# Patient Record
Sex: MALE | Race: WHITE | Employment: OTHER | URBAN - METROPOLITAN AREA
[De-identification: names, ages, dates, MRNs, and addresses within clinical notes are randomized per-mention and may not be internally consistent; named-entity substitution may affect disease eponyms.]

---

## 2017-01-09 ENCOUNTER — TELEPHONE (OUTPATIENT)
Dept: INTERNAL MEDICINE CLINIC | Age: 82
End: 2017-01-09

## 2017-01-16 ENCOUNTER — OFFICE VISIT (OUTPATIENT)
Dept: INTERNAL MEDICINE CLINIC | Age: 82
End: 2017-01-16

## 2017-01-16 VITALS
HEART RATE: 62 BPM | SYSTOLIC BLOOD PRESSURE: 118 MMHG | HEIGHT: 72 IN | OXYGEN SATURATION: 96 % | DIASTOLIC BLOOD PRESSURE: 68 MMHG | BODY MASS INDEX: 26.14 KG/M2 | TEMPERATURE: 98 F | WEIGHT: 193 LBS | RESPIRATION RATE: 12 BRPM

## 2017-01-16 DIAGNOSIS — J06.9 UPPER RESPIRATORY TRACT INFECTION, UNSPECIFIED TYPE: ICD-10-CM

## 2017-01-16 DIAGNOSIS — F32.A DEPRESSION, UNSPECIFIED DEPRESSION TYPE: Primary | ICD-10-CM

## 2017-01-16 PROBLEM — I50.22 CHRONIC SYSTOLIC HEART FAILURE (HCC): Status: ACTIVE | Noted: 2017-01-16

## 2017-01-16 PROCEDURE — 99213 OFFICE O/P EST LOW 20 MIN: CPT | Performed by: INTERNAL MEDICINE

## 2017-01-16 RX ORDER — AMOXICILLIN 875 MG/1
875 TABLET, COATED ORAL 2 TIMES DAILY
Qty: 20 TABLET | Refills: 0 | Status: SHIPPED | OUTPATIENT
Start: 2017-01-16 | End: 2017-01-26

## 2017-01-16 ASSESSMENT — ENCOUNTER SYMPTOMS
WHEEZING: 0
ABDOMINAL PAIN: 0
COUGH: 1
SORE THROAT: 1
NAUSEA: 0
CHEST TIGHTNESS: 0
SHORTNESS OF BREATH: 0
VOMITING: 0
RHINORRHEA: 1

## 2017-02-21 DIAGNOSIS — F32.A DEPRESSION, UNSPECIFIED DEPRESSION TYPE: ICD-10-CM

## 2017-02-21 RX ORDER — SERTRALINE HYDROCHLORIDE 25 MG/1
TABLET, FILM COATED ORAL
Qty: 30 TABLET | Refills: 3 | Status: SHIPPED | OUTPATIENT
Start: 2017-02-21 | End: 2017-06-23 | Stop reason: SDUPTHER

## 2017-04-10 ENCOUNTER — OFFICE VISIT (OUTPATIENT)
Dept: INTERNAL MEDICINE CLINIC | Age: 82
End: 2017-04-10

## 2017-04-10 VITALS
HEIGHT: 72 IN | BODY MASS INDEX: 26.71 KG/M2 | WEIGHT: 197.2 LBS | DIASTOLIC BLOOD PRESSURE: 70 MMHG | OXYGEN SATURATION: 95 % | RESPIRATION RATE: 12 BRPM | TEMPERATURE: 97.9 F | HEART RATE: 67 BPM | SYSTOLIC BLOOD PRESSURE: 110 MMHG

## 2017-04-10 DIAGNOSIS — I20.1 PRINZMETAL ANGINA (HCC): ICD-10-CM

## 2017-04-10 DIAGNOSIS — M79.642 BILATERAL HAND PAIN: ICD-10-CM

## 2017-04-10 DIAGNOSIS — M79.641 BILATERAL HAND PAIN: ICD-10-CM

## 2017-04-10 DIAGNOSIS — I50.42 CHRONIC COMBINED SYSTOLIC AND DIASTOLIC HEART FAILURE (HCC): ICD-10-CM

## 2017-04-10 DIAGNOSIS — H91.90 HEARING LOSS, UNSPECIFIED LATERALITY: ICD-10-CM

## 2017-04-10 DIAGNOSIS — F32.A DEPRESSION, UNSPECIFIED DEPRESSION TYPE: Primary | ICD-10-CM

## 2017-04-10 PROCEDURE — 99214 OFFICE O/P EST MOD 30 MIN: CPT | Performed by: INTERNAL MEDICINE

## 2017-04-13 ASSESSMENT — ENCOUNTER SYMPTOMS
DIARRHEA: 0
ABDOMINAL PAIN: 0
SORE THROAT: 0
RHINORRHEA: 0
SHORTNESS OF BREATH: 0
VOMITING: 0
CHEST TIGHTNESS: 0
CONSTIPATION: 0
NAUSEA: 0
COUGH: 0

## 2017-04-24 ENCOUNTER — OFFICE VISIT (OUTPATIENT)
Dept: ORTHOPEDIC SURGERY | Age: 82
End: 2017-04-24

## 2017-04-24 VITALS — HEIGHT: 72 IN | WEIGHT: 197.09 LBS | BODY MASS INDEX: 26.7 KG/M2

## 2017-04-24 DIAGNOSIS — M25.531 BILATERAL WRIST PAIN: Primary | ICD-10-CM

## 2017-04-24 DIAGNOSIS — M65.30 TRIGGER FINGER, UNSPECIFIED FINGER, UNSPECIFIED LATERALITY: ICD-10-CM

## 2017-04-24 DIAGNOSIS — G56.03 BILATERAL CARPAL TUNNEL SYNDROME: ICD-10-CM

## 2017-04-24 DIAGNOSIS — M25.532 BILATERAL WRIST PAIN: Primary | ICD-10-CM

## 2017-04-24 PROCEDURE — 73110 X-RAY EXAM OF WRIST: CPT | Performed by: ORTHOPAEDIC SURGERY

## 2017-04-24 PROCEDURE — 99203 OFFICE O/P NEW LOW 30 MIN: CPT | Performed by: ORTHOPAEDIC SURGERY

## 2017-04-26 ENCOUNTER — OFFICE VISIT (OUTPATIENT)
Dept: ORTHOPEDIC SURGERY | Age: 82
End: 2017-04-26

## 2017-04-26 DIAGNOSIS — M79.602 PAIN IN BOTH UPPER EXTREMITIES: Primary | ICD-10-CM

## 2017-04-26 DIAGNOSIS — M79.601 PAIN IN BOTH UPPER EXTREMITIES: Primary | ICD-10-CM

## 2017-04-26 PROCEDURE — 95886 MUSC TEST DONE W/N TEST COMP: CPT | Performed by: PHYSICAL MEDICINE & REHABILITATION

## 2017-04-26 PROCEDURE — 95911 NRV CNDJ TEST 9-10 STUDIES: CPT | Performed by: PHYSICAL MEDICINE & REHABILITATION

## 2017-05-01 ENCOUNTER — OFFICE VISIT (OUTPATIENT)
Dept: ORTHOPEDIC SURGERY | Age: 82
End: 2017-05-01

## 2017-05-01 VITALS — HEIGHT: 72 IN | BODY MASS INDEX: 26.7 KG/M2 | WEIGHT: 197.09 LBS

## 2017-05-01 DIAGNOSIS — M65.30 TRIGGER FINGER (ACQUIRED): ICD-10-CM

## 2017-05-01 DIAGNOSIS — G56.02 CARPAL TUNNEL SYNDROME ON LEFT: ICD-10-CM

## 2017-05-01 DIAGNOSIS — G56.01 CARPAL TUNNEL SYNDROME ON RIGHT: Primary | ICD-10-CM

## 2017-05-01 PROCEDURE — 99213 OFFICE O/P EST LOW 20 MIN: CPT | Performed by: ORTHOPAEDIC SURGERY

## 2017-06-23 DIAGNOSIS — F32.A DEPRESSION, UNSPECIFIED DEPRESSION TYPE: ICD-10-CM

## 2017-06-23 RX ORDER — SERTRALINE HYDROCHLORIDE 25 MG/1
TABLET, FILM COATED ORAL
Qty: 30 TABLET | Refills: 2 | OUTPATIENT
Start: 2017-06-23

## 2017-06-23 RX ORDER — SERTRALINE HYDROCHLORIDE 25 MG/1
TABLET, FILM COATED ORAL
Qty: 30 TABLET | Refills: 2 | Status: SHIPPED | OUTPATIENT
Start: 2017-06-23 | End: 2017-09-27 | Stop reason: SDUPTHER

## 2017-07-10 ENCOUNTER — OFFICE VISIT (OUTPATIENT)
Dept: INTERNAL MEDICINE CLINIC | Age: 82
End: 2017-07-10

## 2017-07-10 VITALS
WEIGHT: 202.8 LBS | HEIGHT: 72 IN | OXYGEN SATURATION: 95 % | DIASTOLIC BLOOD PRESSURE: 60 MMHG | SYSTOLIC BLOOD PRESSURE: 100 MMHG | RESPIRATION RATE: 14 BRPM | HEART RATE: 65 BPM | TEMPERATURE: 98 F | BODY MASS INDEX: 27.47 KG/M2

## 2017-07-10 DIAGNOSIS — R05.9 COUGH: ICD-10-CM

## 2017-07-10 DIAGNOSIS — R73.03 PREDIABETES: ICD-10-CM

## 2017-07-10 DIAGNOSIS — R31.9 HEMATURIA: ICD-10-CM

## 2017-07-10 DIAGNOSIS — F32.A DEPRESSION, UNSPECIFIED DEPRESSION TYPE: Primary | ICD-10-CM

## 2017-07-10 DIAGNOSIS — D64.9 ANEMIA, UNSPECIFIED TYPE: ICD-10-CM

## 2017-07-10 DIAGNOSIS — E78.5 HYPERLIPIDEMIA, UNSPECIFIED HYPERLIPIDEMIA TYPE: ICD-10-CM

## 2017-07-10 DIAGNOSIS — R82.998 LEUKOCYTES IN URINE: ICD-10-CM

## 2017-07-10 DIAGNOSIS — I10 ESSENTIAL HYPERTENSION: ICD-10-CM

## 2017-07-10 DIAGNOSIS — J30.9 ALLERGIC RHINITIS, UNSPECIFIED ALLERGIC RHINITIS TRIGGER, UNSPECIFIED RHINITIS SEASONALITY: ICD-10-CM

## 2017-07-10 LAB
APPEARANCE FLUID: ABNORMAL
BILIRUBIN, POC: ABNORMAL
BLOOD URINE, POC: ABNORMAL
CLARITY, POC: ABNORMAL
COLOR, POC: ABNORMAL
GLUCOSE URINE, POC: ABNORMAL
KETONES, POC: ABNORMAL
LEUKOCYTE EST, POC: ABNORMAL
NITRITE, POC: ABNORMAL
PH, POC: 6
PROTEIN, POC: ABNORMAL
SPECIFIC GRAVITY, POC: 1.02
UROBILINOGEN, POC: 0.2

## 2017-07-10 PROCEDURE — 99214 OFFICE O/P EST MOD 30 MIN: CPT | Performed by: INTERNAL MEDICINE

## 2017-07-10 PROCEDURE — 81002 URINALYSIS NONAUTO W/O SCOPE: CPT | Performed by: INTERNAL MEDICINE

## 2017-07-10 RX ORDER — BENZONATATE 100 MG/1
100 CAPSULE ORAL 3 TIMES DAILY PRN
Qty: 30 CAPSULE | Refills: 0 | Status: SHIPPED | OUTPATIENT
Start: 2017-07-10 | End: 2017-07-17

## 2017-07-10 RX ORDER — LORATADINE 10 MG/1
10 TABLET ORAL DAILY
Qty: 30 TABLET | Refills: 1 | Status: SHIPPED | OUTPATIENT
Start: 2017-07-10 | End: 2018-01-08 | Stop reason: SDUPTHER

## 2017-07-10 RX ORDER — FLUTICASONE PROPIONATE 50 MCG
2 SPRAY, SUSPENSION (ML) NASAL DAILY
Qty: 1 BOTTLE | Refills: 0 | Status: SHIPPED | OUTPATIENT
Start: 2017-07-10 | End: 2017-07-26 | Stop reason: SDUPTHER

## 2017-07-10 ASSESSMENT — PATIENT HEALTH QUESTIONNAIRE - PHQ9
1. LITTLE INTEREST OR PLEASURE IN DOING THINGS: 1
SUM OF ALL RESPONSES TO PHQ QUESTIONS 1-9: 2
SUM OF ALL RESPONSES TO PHQ9 QUESTIONS 1 & 2: 2
2. FEELING DOWN, DEPRESSED OR HOPELESS: 1

## 2017-07-12 DIAGNOSIS — N39.0 URINARY TRACT INFECTION WITH HEMATURIA, SITE UNSPECIFIED: Primary | ICD-10-CM

## 2017-07-12 DIAGNOSIS — R31.9 URINARY TRACT INFECTION WITH HEMATURIA, SITE UNSPECIFIED: Primary | ICD-10-CM

## 2017-07-12 LAB
ORGANISM: ABNORMAL
URINE CULTURE, ROUTINE: ABNORMAL

## 2017-07-12 RX ORDER — CIPROFLOXACIN 500 MG/1
500 TABLET, FILM COATED ORAL 2 TIMES DAILY
Qty: 14 TABLET | Refills: 0 | Status: SHIPPED | OUTPATIENT
Start: 2017-07-12 | End: 2017-07-19

## 2017-07-13 ASSESSMENT — ENCOUNTER SYMPTOMS
ABDOMINAL PAIN: 0
WHEEZING: 0
SORE THROAT: 0
DIARRHEA: 0
NAUSEA: 0
RHINORRHEA: 0
VOMITING: 0
CHEST TIGHTNESS: 0
CONSTIPATION: 0
SHORTNESS OF BREATH: 0
COUGH: 0

## 2017-07-15 LAB
ALBUMIN SERPL-MCNC: 4 G/DL (ref 3.5–5)
ALP BLD-CCNC: 58 IU/L (ref 35–135)
ALT SERPL-CCNC: 16 IU/L (ref 10–60)
AST SERPL-CCNC: 21 IU/L (ref 10–40)
AVERAGE GLUCOSE: NORMAL
BASOPHILS ABSOLUTE: 0.1 THOU/MCL (ref 0.01–0.07)
BASOPHILS ABSOLUTE: 1 %
BILIRUB SERPL-MCNC: 0.5 MG/DL (ref 0–1.2)
BUN BLDV-MCNC: 20 MG/DL (ref 8–26)
CALCIUM SERPL-MCNC: 9.1 MG/DL (ref 8.5–10.5)
CHLORIDE BLD-SCNC: 105 MEQ/L (ref 101–111)
CHOLESTEROL, TOTAL: 139 MG/DL
CHOLESTEROL/HDL RATIO: 3.9 (ref 1.9–4.2)
CO2: 26 MEQ/L (ref 24–36)
CREAT SERPL-MCNC: 1.3 MG/DL (ref 0.64–1.27)
EOSINOPHILS ABSOLUTE: 0.94 THOU/MCL (ref 0.03–0.45)
EOSINOPHILS RELATIVE PERCENT: 10 %
FERRITIN: 128.3 NG/ML (ref 23.9–336.2)
GFR AFRICAN AMERICAN: >60 ML/MIN/1.73 SQ METER
GFR NON-AFRICAN AMERICAN: 53 ML/MIN/1.73 SQ METER
GLUCOSE BLD-MCNC: 125 MG/DL (ref 70–99)
HBA1C MFR BLD: 5.7 %
HCT VFR BLD CALC: 44 % (ref 40–50)
HDLC SERPL-MCNC: 36 MG/DL
HEMOGLOBIN: 14.7 G/DL (ref 13.5–16.5)
IRON SATURATION: 24 % (ref 20–55)
IRON: 79 MCG/DL (ref 50–160)
LDL CHOLESTEROL CALCULATED: 83 MG/DL
LDL/HDL RATIO: 2.3 (ref 1–4)
LYMPHOCYTES ABSOLUTE: 2.76 THOU/MCL (ref 1–4)
LYMPHOCYTES RELATIVE PERCENT: 30 %
MCH RBC QN AUTO: 34 PG (ref 27–33)
MCHC RBC AUTO-ENTMCNC: 33 G/DL (ref 32–36)
MCV RBC AUTO: 102 FL (ref 82–97)
MONOCYTES # BLD: 12 %
MONOCYTES ABSOLUTE: 1.11 THOU/MCL (ref 0.2–0.9)
NEUTROPHILS ABSOLUTE: 4.24 THOU/MCL (ref 1.8–7.7)
PDW BLD-RTO: 14.2 %
PLATELET # BLD: 357 THOU/MCL (ref 140–375)
POTASSIUM SERPL-SCNC: 4.3 MEQ/L (ref 3.6–5.1)
RBC # BLD: 4.32 MIL/MCL (ref 4.4–5.8)
SEG NEUTROPHILS: 47 %
SODIUM BLD-SCNC: 139 MEQ/L (ref 135–145)
TOTAL IRON BINDING CAPACITY: 333 MCG/DL (ref 250–400)
TOTAL PROTEIN: 7 G/DL (ref 6–8)
TRIGL SERPL-MCNC: 101 MG/DL
TSH ULTRASENSITIVE: 1.88 MIU/L (ref 0.4–4.2)
WBC # BLD: 9.1 THOU/MCL (ref 3.6–10.5)

## 2017-07-16 LAB
ESTIMATED AVERAGE GLUCOSE: 146 MG/DL
HBA1C MFR BLD: 6.7 % (ref 4–5.6)

## 2017-07-17 DIAGNOSIS — R05.9 COUGH: ICD-10-CM

## 2017-07-19 ENCOUNTER — TELEPHONE (OUTPATIENT)
Dept: INTERNAL MEDICINE CLINIC | Age: 82
End: 2017-07-19

## 2017-07-26 ENCOUNTER — OFFICE VISIT (OUTPATIENT)
Dept: INTERNAL MEDICINE CLINIC | Age: 82
End: 2017-07-26

## 2017-07-26 VITALS
BODY MASS INDEX: 27.77 KG/M2 | OXYGEN SATURATION: 96 % | TEMPERATURE: 97.9 F | SYSTOLIC BLOOD PRESSURE: 114 MMHG | HEART RATE: 74 BPM | DIASTOLIC BLOOD PRESSURE: 70 MMHG | WEIGHT: 205 LBS | HEIGHT: 72 IN

## 2017-07-26 DIAGNOSIS — I10 ESSENTIAL HYPERTENSION: ICD-10-CM

## 2017-07-26 DIAGNOSIS — I50.42 CHRONIC COMBINED SYSTOLIC AND DIASTOLIC HEART FAILURE (HCC): ICD-10-CM

## 2017-07-26 DIAGNOSIS — N40.1 BENIGN PROSTATIC HYPERPLASIA WITH URINARY OBSTRUCTION: ICD-10-CM

## 2017-07-26 DIAGNOSIS — Z01.818 PRE-OP EVALUATION: Primary | ICD-10-CM

## 2017-07-26 DIAGNOSIS — Z95.810 CARDIAC DEFIBRILLATOR IN PLACE: ICD-10-CM

## 2017-07-26 DIAGNOSIS — N13.8 BENIGN PROSTATIC HYPERPLASIA WITH URINARY OBSTRUCTION: ICD-10-CM

## 2017-07-26 DIAGNOSIS — R31.9 HEMATURIA: Chronic | ICD-10-CM

## 2017-07-26 DIAGNOSIS — E78.2 MIXED HYPERLIPIDEMIA: Chronic | ICD-10-CM

## 2017-07-26 LAB
ANION GAP SERPL CALCULATED.3IONS-SCNC: 16 MMOL/L (ref 3–16)
BUN BLDV-MCNC: 15 MG/DL (ref 7–20)
CALCIUM SERPL-MCNC: 9.2 MG/DL (ref 8.3–10.6)
CHLORIDE BLD-SCNC: 103 MMOL/L (ref 99–110)
CO2: 23 MMOL/L (ref 21–32)
CREAT SERPL-MCNC: 1.2 MG/DL (ref 0.8–1.3)
GFR AFRICAN AMERICAN: >60
GFR NON-AFRICAN AMERICAN: 58
GLUCOSE BLD-MCNC: 101 MG/DL (ref 70–99)
POTASSIUM SERPL-SCNC: 4.4 MMOL/L (ref 3.5–5.1)
SODIUM BLD-SCNC: 142 MMOL/L (ref 136–145)

## 2017-07-26 PROCEDURE — 99215 OFFICE O/P EST HI 40 MIN: CPT | Performed by: INTERNAL MEDICINE

## 2017-08-15 ENCOUNTER — TELEPHONE (OUTPATIENT)
Dept: INTERNAL MEDICINE CLINIC | Age: 82
End: 2017-08-15

## 2017-08-22 ENCOUNTER — OFFICE VISIT (OUTPATIENT)
Dept: INTERNAL MEDICINE CLINIC | Age: 82
End: 2017-08-22

## 2017-08-22 VITALS
HEIGHT: 72 IN | OXYGEN SATURATION: 97 % | BODY MASS INDEX: 26.41 KG/M2 | DIASTOLIC BLOOD PRESSURE: 70 MMHG | HEART RATE: 62 BPM | SYSTOLIC BLOOD PRESSURE: 124 MMHG | TEMPERATURE: 97.6 F | WEIGHT: 195 LBS

## 2017-08-22 DIAGNOSIS — M46.26 OSTEOMYELITIS OF LUMBAR SPINE (HCC): Primary | ICD-10-CM

## 2017-08-22 DIAGNOSIS — A41.9 SEVERE SEPSIS WITHOUT SEPTIC SHOCK (HCC): ICD-10-CM

## 2017-08-22 DIAGNOSIS — I65.23 BILATERAL CAROTID ARTERY STENOSIS: ICD-10-CM

## 2017-08-22 DIAGNOSIS — I50.42 CHRONIC COMBINED SYSTOLIC AND DIASTOLIC HEART FAILURE (HCC): ICD-10-CM

## 2017-08-22 DIAGNOSIS — R65.20 SEVERE SEPSIS WITHOUT SEPTIC SHOCK (HCC): ICD-10-CM

## 2017-08-22 DIAGNOSIS — R73.9 HYPERGLYCEMIA: ICD-10-CM

## 2017-08-22 DIAGNOSIS — I25.10 CAD IN NATIVE ARTERY: ICD-10-CM

## 2017-08-22 DIAGNOSIS — M46.46 DISCITIS OF LUMBAR REGION: ICD-10-CM

## 2017-08-22 DIAGNOSIS — F32.A DEPRESSION, UNSPECIFIED DEPRESSION TYPE: ICD-10-CM

## 2017-08-22 DIAGNOSIS — I10 ESSENTIAL HYPERTENSION: ICD-10-CM

## 2017-08-22 DIAGNOSIS — N30.00 ACUTE CYSTITIS WITHOUT HEMATURIA: ICD-10-CM

## 2017-08-22 PROCEDURE — 99214 OFFICE O/P EST MOD 30 MIN: CPT | Performed by: NURSE PRACTITIONER

## 2017-08-22 RX ORDER — NITROFURANTOIN 25; 75 MG/1; MG/1
CAPSULE ORAL
COMMUNITY
Start: 2017-08-04 | End: 2017-10-09 | Stop reason: HOSPADM

## 2017-08-22 RX ORDER — FINASTERIDE 5 MG/1
5 TABLET, FILM COATED ORAL DAILY
COMMUNITY

## 2017-08-22 RX ORDER — OXYCODONE HYDROCHLORIDE 5 MG/1
10 TABLET ORAL EVERY 4 HOURS PRN
COMMUNITY
Start: 2017-08-11 | End: 2018-01-08 | Stop reason: ALTCHOICE

## 2017-08-22 RX ORDER — CIPROFLOXACIN 500 MG/1
500 TABLET, FILM COATED ORAL 2 TIMES DAILY
COMMUNITY
Start: 2017-08-17 | End: 2018-01-08 | Stop reason: ALTCHOICE

## 2017-08-22 RX ORDER — SENNA AND DOCUSATE SODIUM 50; 8.6 MG/1; MG/1
1 TABLET, FILM COATED ORAL DAILY
COMMUNITY
End: 2019-04-08

## 2017-08-22 RX ORDER — TAMSULOSIN HYDROCHLORIDE 0.4 MG/1
0.4 CAPSULE ORAL DAILY
COMMUNITY

## 2017-08-22 RX ORDER — POLYETHYLENE GLYCOL 3350 17 G/17G
17 POWDER, FOR SOLUTION ORAL DAILY
COMMUNITY
End: 2019-04-08 | Stop reason: ALTCHOICE

## 2017-08-22 ASSESSMENT — ENCOUNTER SYMPTOMS: SHORTNESS OF BREATH: 0

## 2017-08-23 ENCOUNTER — TELEPHONE (OUTPATIENT)
Dept: INTERNAL MEDICINE CLINIC | Age: 82
End: 2017-08-23

## 2017-09-04 ASSESSMENT — ENCOUNTER SYMPTOMS: CONSTIPATION: 0

## 2017-09-27 DIAGNOSIS — F32.A DEPRESSION, UNSPECIFIED DEPRESSION TYPE: ICD-10-CM

## 2017-09-27 RX ORDER — SERTRALINE HYDROCHLORIDE 25 MG/1
TABLET, FILM COATED ORAL
Qty: 30 TABLET | Refills: 3 | Status: SHIPPED | OUTPATIENT
Start: 2017-09-27 | End: 2018-01-08 | Stop reason: DRUGHIGH

## 2017-10-09 ENCOUNTER — TELEPHONE (OUTPATIENT)
Dept: INTERNAL MEDICINE CLINIC | Age: 82
End: 2017-10-09

## 2017-10-09 ENCOUNTER — OFFICE VISIT (OUTPATIENT)
Dept: INTERNAL MEDICINE CLINIC | Age: 82
End: 2017-10-09

## 2017-10-09 VITALS
BODY MASS INDEX: 27.41 KG/M2 | TEMPERATURE: 97.9 F | OXYGEN SATURATION: 97 % | HEART RATE: 83 BPM | WEIGHT: 202.4 LBS | DIASTOLIC BLOOD PRESSURE: 64 MMHG | SYSTOLIC BLOOD PRESSURE: 120 MMHG | HEIGHT: 72 IN | RESPIRATION RATE: 12 BRPM

## 2017-10-09 DIAGNOSIS — E11.9 TYPE 2 DIABETES MELLITUS WITHOUT COMPLICATION, WITHOUT LONG-TERM CURRENT USE OF INSULIN (HCC): ICD-10-CM

## 2017-10-09 DIAGNOSIS — F32.A DEPRESSION, UNSPECIFIED DEPRESSION TYPE: Primary | ICD-10-CM

## 2017-10-09 PROCEDURE — 99213 OFFICE O/P EST LOW 20 MIN: CPT | Performed by: INTERNAL MEDICINE

## 2017-10-09 RX ORDER — LANCETS
1 EACH MISCELLANEOUS DAILY
Qty: 100 EACH | Refills: 3 | Status: SHIPPED | OUTPATIENT
Start: 2017-10-09 | End: 2020-08-20

## 2017-10-09 NOTE — PATIENT INSTRUCTIONS
for good. Follow-up care is a key part of your treatment and safety. Be sure to make and go to all appointments, and call your doctor if you are having problems. It's also a good idea to know your test results and keep a list of the medicines you take. Where can you learn more? Go to [email or web address] and sign in to your [employer] account. Enter N330 in the Elastera box to learn more about \"Learning About Type 2 Diabetes. \"     If you do not have an account, please click on the \"Sign Up Now\" link. Current as of: March 13, 2017  Content Version: 11.3  © 2251-9693 [employer]. Care instructions adapted under license by [facility] ([facility]). If you have questions about a medical condition or this instruction, always ask your healthcare professional. [address] any warranty or liability for your use of this information.

## 2017-10-09 NOTE — PROGRESS NOTES
Santy Rios   YOB: 1935    Date of Visit:  10/9/2017    Chief Complaint   Patient presents with    Depression    Diabetes       HPI  Patient has depression. Patient takes sertraline. Patient's mood has been stable. Patient states his motivation is okay. Patient denies feeling sad and down. Patient has Type 2 diabetes based on recent labs. Patient previously had prediabetes. Patient states he doesn't eat as many carbohydrates because his wife is diabetic. Patient is not exercising. Review of Systems   Constitutional: Negative for fatigue. Eyes: Negative for visual disturbance. Respiratory: Negative for shortness of breath. Cardiovascular: Negative for chest pain, palpitations and leg swelling. Gastrointestinal: Negative for abdominal pain, nausea and vomiting. Genitourinary: Negative for frequency. Skin: Negative for wound. Neurological: Negative for dizziness, light-headedness, numbness and headaches. Psychiatric/Behavioral: Negative for decreased concentration, dysphoric mood and sleep disturbance. The patient is not nervous/anxious.         Allergies   Allergen Reactions    Isosorbide Nitrate Other (See Comments)     headache    Nitroglycerin Other (See Comments)     headache     Outpatient Prescriptions Marked as Taking for the 10/9/17 encounter (Office Visit) with Deion Castillo MD   Medication Sig Dispense Refill    sodium chloride 0.9 % SOLN 100 mL with meropenem 1 g SOLR 2 g Infuse 2 g intravenously      sertraline (ZOLOFT) 25 MG tablet TAKE 1 TABLET BY MOUTH ONE TIME A DAY  30 tablet 3    ciprofloxacin (CIPRO) 500 MG tablet Take 500 mg by mouth 2 times daily       sennosides-docusate sodium (SENOKOT-S) 8.6-50 MG tablet Take 1 tablet by mouth daily      polyethylene glycol (GLYCOLAX) powder Take 17 g by mouth daily      finasteride (PROSCAR) 5 MG tablet Take 5 mg by mouth daily      tamsulosin (FLOMAX) 0.4 MG capsule Take 0.4 mg by mouth daily      loratadine (CLARITIN) 10 MG tablet Take 1 tablet by mouth daily 30 tablet 1    albuterol sulfate  (90 BASE) MCG/ACT inhaler Inhale 1-2 puffs into the lungs every 6 hours as needed for Wheezing 1 Inhaler 0    ramipril (ALTACE) 2.5 MG capsule Take 2.5 mg by mouth daily      acetaminophen (TYLENOL) 325 MG tablet Take 650 mg by mouth every 6 hours as needed for Pain      aspirin 81 MG tablet Take 81 mg by mouth daily.  metoprolol (TOPROL-XL) 25 MG XL tablet Take 25 mg by mouth 2 times daily       clopidogrel (PLAVIX) 75 MG tablet Take 75 mg by mouth daily.  simvastatin (ZOCOR) 20 MG tablet Take 20 mg by mouth nightly.  Multiple Vitamins-Minerals (THERAPEUTIC MULTIVITAMIN-MINERALS) tablet Take 1 tablet by mouth daily.  fluticasone (FLONASE) 50 MCG/ACT nasal spray 2 sprays by Nasal route daily. 1 Bottle 0         Vitals:    10/09/17 1434   BP: 120/64   Site: Left Arm   Position: Sitting   Cuff Size: Large Adult   Pulse: 83   Resp: 12   Temp: 97.9 °F (36.6 °C)   TempSrc: Oral   SpO2: 97%   Weight: 202 lb 6.4 oz (91.8 kg)   Height: 6' (1.829 m)     Body mass index is 27.45 kg/m². Physical Exam   Constitutional: He appears well-developed and well-nourished. No distress. Elderly WM   HENT:   Mouth/Throat: Oropharynx is clear and moist and mucous membranes are normal.   Eyes: Conjunctivae, EOM and lids are normal. Pupils are equal, round, and reactive to light. Neck: Carotid bruit is not present. No thyromegaly present. Cardiovascular: Normal rate, regular rhythm, S1 normal, S2 normal, intact distal pulses and normal pulses. Exam reveals no gallop and no friction rub. Murmur heard. Pulmonary/Chest: Effort normal and breath sounds normal. He has no wheezes. He has no rhonchi. He has no rales. Abdominal: Soft. Bowel sounds are normal. He exhibits no distension. There is no hepatosplenomegaly. There is no tenderness.    Neurological:   Bilateral foot monofilament exam normal   Skin: exercise  - Microalbumin / Creatinine Urine Ratio  -  DIABETES FOOT EXAM  -schedule a diabetic eye exam      Discussed medications with patient, who voiced understanding of their use and indications. All questions answered. Quality & Risk Score Accuracy - MEDICARE ADVANTAGE    Visit Dx:  Depression, unspecified depression type  Stable/controlled based upon review of recent symptoms. Continue current treatment plan and follow up at least yearly. Last edited 10/15/17 10:08 EDT by Vipul Cheney MD           Return in about 3 months (around 1/9/2018) for diabetes and depression.

## 2017-10-10 LAB
CREATININE URINE: 67.8 MG/DL (ref 39–259)
MICROALBUMIN UR-MCNC: 2.8 MG/DL
MICROALBUMIN/CREAT UR-RTO: 41.3 MG/G (ref 0–30)

## 2017-10-14 ASSESSMENT — ENCOUNTER SYMPTOMS
SHORTNESS OF BREATH: 0
NAUSEA: 0
ABDOMINAL PAIN: 0
VOMITING: 0

## 2017-11-06 ENCOUNTER — HOSPITAL ENCOUNTER (OUTPATIENT)
Dept: OTHER | Age: 82
Discharge: HOME | End: 2017-11-30
Attending: INTERNAL MEDICINE | Admitting: INTERNAL MEDICINE

## 2017-11-06 LAB
A/G RATIO: 1.7 (ref 1.1–2.2)
ALBUMIN SERPL-MCNC: 4 G/DL (ref 3.4–5)
ALP BLD-CCNC: 82 U/L (ref 40–129)
ALT SERPL-CCNC: 14 U/L (ref 10–40)
ANION GAP SERPL CALCULATED.3IONS-SCNC: 10 MMOL/L (ref 3–16)
AST SERPL-CCNC: 21 U/L (ref 15–37)
BASOPHILS ABSOLUTE: 0.1 K/UL (ref 0–0.2)
BASOPHILS RELATIVE PERCENT: 1.2 %
BILIRUB SERPL-MCNC: 0.4 MG/DL (ref 0–1)
BUN BLDV-MCNC: 19 MG/DL (ref 7–20)
CALCIUM SERPL-MCNC: 9.1 MG/DL (ref 8.3–10.6)
CHLORIDE BLD-SCNC: 103 MMOL/L (ref 99–110)
CO2: 27 MMOL/L (ref 21–32)
CREAT SERPL-MCNC: 0.9 MG/DL (ref 0.8–1.3)
EOSINOPHILS ABSOLUTE: 1 K/UL (ref 0–0.6)
EOSINOPHILS RELATIVE PERCENT: 10.3 %
GFR AFRICAN AMERICAN: >60
GFR NON-AFRICAN AMERICAN: >60
GLOBULIN: 2.4 G/DL
GLUCOSE BLD-MCNC: 105 MG/DL (ref 70–99)
HCT VFR BLD CALC: 38.1 % (ref 40.5–52.5)
HEMOGLOBIN: 12.7 G/DL (ref 13.5–17.5)
LYMPHOCYTES ABSOLUTE: 1.7 K/UL (ref 1–5.1)
LYMPHOCYTES RELATIVE PERCENT: 18.2 %
MCH RBC QN AUTO: 32.8 PG (ref 26–34)
MCHC RBC AUTO-ENTMCNC: 33.3 G/DL (ref 31–36)
MCV RBC AUTO: 98.4 FL (ref 80–100)
MONOCYTES ABSOLUTE: 1.2 K/UL (ref 0–1.3)
MONOCYTES RELATIVE PERCENT: 12.5 %
NEUTROPHILS ABSOLUTE: 5.4 K/UL (ref 1.7–7.7)
NEUTROPHILS RELATIVE PERCENT: 57.8 %
PDW BLD-RTO: 14.3 % (ref 12.4–15.4)
PLATELET # BLD: 395 K/UL (ref 135–450)
PMV BLD AUTO: 7.7 FL (ref 5–10.5)
POTASSIUM SERPL-SCNC: 4.6 MMOL/L (ref 3.5–5.1)
RBC # BLD: 3.87 M/UL (ref 4.2–5.9)
SEDIMENTATION RATE, ERYTHROCYTE: 9 MM/HR (ref 0–20)
SODIUM BLD-SCNC: 140 MMOL/L (ref 136–145)
TOTAL PROTEIN: 6.4 G/DL (ref 6.4–8.2)
WBC # BLD: 9.3 K/UL (ref 4–11)

## 2017-11-07 LAB — C-REACTIVE PROTEIN: 3.4 MG/L (ref 0–5.1)

## 2017-12-01 ENCOUNTER — HOSPITAL ENCOUNTER (OUTPATIENT)
Dept: OTHER | Age: 82
Discharge: HOME | End: 2017-12-31
Attending: INTERNAL MEDICINE | Admitting: INTERNAL MEDICINE

## 2018-01-08 ENCOUNTER — OFFICE VISIT (OUTPATIENT)
Dept: INTERNAL MEDICINE CLINIC | Age: 83
End: 2018-01-08

## 2018-01-08 VITALS
SYSTOLIC BLOOD PRESSURE: 98 MMHG | HEIGHT: 72 IN | WEIGHT: 200 LBS | HEART RATE: 70 BPM | BODY MASS INDEX: 27.09 KG/M2 | OXYGEN SATURATION: 96 % | DIASTOLIC BLOOD PRESSURE: 60 MMHG

## 2018-01-08 DIAGNOSIS — E11.9 DIABETES MELLITUS WITHOUT COMPLICATION (HCC): Primary | ICD-10-CM

## 2018-01-08 DIAGNOSIS — F32.A DEPRESSION, UNSPECIFIED DEPRESSION TYPE: ICD-10-CM

## 2018-01-08 DIAGNOSIS — R09.82 POSTNASAL DRIP: ICD-10-CM

## 2018-01-08 DIAGNOSIS — R05.9 COUGH: ICD-10-CM

## 2018-01-08 LAB — HBA1C MFR BLD: 6 %

## 2018-01-08 PROCEDURE — 99214 OFFICE O/P EST MOD 30 MIN: CPT | Performed by: INTERNAL MEDICINE

## 2018-01-08 PROCEDURE — 83036 HEMOGLOBIN GLYCOSYLATED A1C: CPT | Performed by: INTERNAL MEDICINE

## 2018-01-08 RX ORDER — LORATADINE 10 MG/1
10 TABLET ORAL DAILY
Qty: 30 TABLET | Refills: 1 | Status: SHIPPED | OUTPATIENT
Start: 2018-01-08

## 2018-01-08 RX ORDER — BENZONATATE 100 MG/1
100 CAPSULE ORAL 3 TIMES DAILY PRN
Qty: 30 CAPSULE | Refills: 1 | Status: SHIPPED | OUTPATIENT
Start: 2018-01-08 | End: 2018-01-15

## 2018-01-08 NOTE — PROGRESS NOTES
MULTIVITAMIN-MINERALS) tablet Take 1 tablet by mouth daily.  fluticasone (FLONASE) 50 MCG/ACT nasal spray 2 sprays by Nasal route daily. 1 Bottle 0         Vitals:    01/08/18 1118   BP: 98/60   Site: Left Arm   Position: Sitting   Cuff Size: Large Adult   Pulse: 70   SpO2: 96%   Weight: 200 lb (90.7 kg)   Height: 6' (1.829 m)     Body mass index is 27.12 kg/m². Physical Exam   Constitutional: He appears well-developed and well-nourished. No distress. Elderly WM   HENT:   Right Ear: Tympanic membrane and ear canal normal.   Left Ear: Tympanic membrane and ear canal normal.   Nose: Right sinus exhibits no maxillary sinus tenderness. Left sinus exhibits no maxillary sinus tenderness. Mouth/Throat: Oropharynx is clear and moist and mucous membranes are normal.   Eyes: Conjunctivae, EOM and lids are normal. Pupils are equal, round, and reactive to light. Neck: Neck supple. Carotid bruit is not present. No thyromegaly present. Cardiovascular: Normal rate, regular rhythm, S1 normal, S2 normal and normal heart sounds. Exam reveals no gallop and no friction rub. No murmur heard. Pulmonary/Chest: Effort normal and breath sounds normal. No respiratory distress. He has no wheezes. He has no rhonchi. He has no rales. Abdominal: Soft. Normal appearance and bowel sounds are normal. He exhibits no distension. There is no hepatosplenomegaly. There is no tenderness. Musculoskeletal: He exhibits no edema. Lymphadenopathy:        Head (right side): No submandibular adenopathy present. Head (left side): No submandibular adenopathy present. Psychiatric: He has a normal mood and affect. Nursing note reviewed.       Results for POC orders placed in visit on 01/08/18   POCT glycosylated hemoglobin (Hb A1C)   Result Value Ref Range    Hemoglobin A1C 6.0 %       Lab Results   Component Value Date    LABA1C 6.7 (H) 07/15/2017     Lab Results   Component Value Date     11/06/2017    K 4.6 11/06/2017

## 2018-01-08 NOTE — PATIENT INSTRUCTIONS
-Continue same medications  -Increase Sertraline to 50mg once daily  -Tessalon Perles 100mg 3 times daily as needed for cough  -Loratadine (generic Claritin) 10mg once daily  -chest xray

## 2018-01-14 ASSESSMENT — ENCOUNTER SYMPTOMS
BACK PAIN: 1
ABDOMINAL PAIN: 0
SHORTNESS OF BREATH: 0
VOMITING: 0
SORE THROAT: 0
NAUSEA: 0
RHINORRHEA: 0
WHEEZING: 0
CHEST TIGHTNESS: 0
COUGH: 1
DIARRHEA: 0
CONSTIPATION: 0

## 2018-04-09 ENCOUNTER — OFFICE VISIT (OUTPATIENT)
Dept: INTERNAL MEDICINE CLINIC | Age: 83
End: 2018-04-09

## 2018-04-09 VITALS
HEART RATE: 69 BPM | HEIGHT: 72 IN | DIASTOLIC BLOOD PRESSURE: 60 MMHG | BODY MASS INDEX: 27.28 KG/M2 | OXYGEN SATURATION: 93 % | WEIGHT: 201.4 LBS | SYSTOLIC BLOOD PRESSURE: 98 MMHG

## 2018-04-09 DIAGNOSIS — I10 ESSENTIAL HYPERTENSION: ICD-10-CM

## 2018-04-09 DIAGNOSIS — E11.9 TYPE 2 DIABETES MELLITUS WITHOUT COMPLICATION, WITHOUT LONG-TERM CURRENT USE OF INSULIN (HCC): Primary | ICD-10-CM

## 2018-04-09 DIAGNOSIS — I50.42 CHRONIC COMBINED SYSTOLIC AND DIASTOLIC HEART FAILURE (HCC): ICD-10-CM

## 2018-04-09 DIAGNOSIS — F32.5 MAJOR DEPRESSION, SINGLE EPISODE, IN COMPLETE REMISSION (HCC): ICD-10-CM

## 2018-04-09 DIAGNOSIS — E78.2 MIXED HYPERLIPIDEMIA: Chronic | ICD-10-CM

## 2018-04-09 DIAGNOSIS — I20.1 PRINZMETAL ANGINA (HCC): ICD-10-CM

## 2018-04-09 LAB — HBA1C MFR BLD: 6.7 %

## 2018-04-09 PROCEDURE — 83036 HEMOGLOBIN GLYCOSYLATED A1C: CPT | Performed by: INTERNAL MEDICINE

## 2018-04-09 PROCEDURE — 99214 OFFICE O/P EST MOD 30 MIN: CPT | Performed by: INTERNAL MEDICINE

## 2018-04-10 LAB
ALBUMIN SERPL-MCNC: 3.9 G/DL (ref 3.5–5)
ALP BLD-CCNC: 59 IU/L (ref 35–135)
ALT SERPL-CCNC: 19 IU/L (ref 10–60)
ANION GAP SERPL CALCULATED.3IONS-SCNC: 8 MMOL/L (ref 6–18)
AST SERPL-CCNC: 24 IU/L (ref 10–40)
BILIRUB SERPL-MCNC: 0.7 MG/DL (ref 0–1.2)
BUN BLDV-MCNC: 19 MG/DL (ref 8–26)
CALCIUM SERPL-MCNC: 9.1 MG/DL (ref 8.5–10.5)
CHLORIDE BLD-SCNC: 104 MEQ/L (ref 101–111)
CHOLESTEROL, TOTAL: 156 MG/DL
CHOLESTEROL/HDL RATIO: 4.2 (ref 1.9–4.2)
CO2: 28 MEQ/L (ref 24–36)
CREAT SERPL-MCNC: 1.13 MG/DL (ref 0.64–1.27)
GFR AFRICAN AMERICAN: >60 ML/MIN/1.73 SQ METER
GFR NON-AFRICAN AMERICAN: >60 ML/MIN/1.73 SQ METER
GLUCOSE BLD-MCNC: 118 MG/DL (ref 70–99)
HDLC SERPL-MCNC: 37 MG/DL
LDL CHOLESTEROL CALCULATED: 85 MG/DL
LDL/HDL RATIO: 2.3 (ref 1–4)
POTASSIUM SERPL-SCNC: 4.2 MEQ/L (ref 3.6–5.1)
SODIUM BLD-SCNC: 140 MEQ/L (ref 135–145)
TOTAL PROTEIN: 7 G/DL (ref 6–8)
TRIGL SERPL-MCNC: 170 MG/DL

## 2018-04-15 PROBLEM — E11.9 TYPE 2 DIABETES MELLITUS WITHOUT COMPLICATION, WITHOUT LONG-TERM CURRENT USE OF INSULIN (HCC): Status: ACTIVE | Noted: 2018-04-15

## 2018-04-15 PROBLEM — F32.5 MAJOR DEPRESSION, SINGLE EPISODE, IN COMPLETE REMISSION (HCC): Status: ACTIVE | Noted: 2018-04-15

## 2018-04-15 ASSESSMENT — ENCOUNTER SYMPTOMS
CHEST TIGHTNESS: 0
NAUSEA: 0
SHORTNESS OF BREATH: 0
CONSTIPATION: 0
COUGH: 0
VOMITING: 0
ABDOMINAL PAIN: 0
DIARRHEA: 0
RHINORRHEA: 0
SORE THROAT: 0
WHEEZING: 0

## 2018-08-27 ENCOUNTER — OFFICE VISIT (OUTPATIENT)
Dept: INTERNAL MEDICINE CLINIC | Age: 83
End: 2018-08-27

## 2018-08-27 VITALS
BODY MASS INDEX: 27.01 KG/M2 | OXYGEN SATURATION: 95 % | WEIGHT: 199.4 LBS | DIASTOLIC BLOOD PRESSURE: 68 MMHG | TEMPERATURE: 97.7 F | SYSTOLIC BLOOD PRESSURE: 104 MMHG | HEIGHT: 72 IN | HEART RATE: 62 BPM

## 2018-08-27 DIAGNOSIS — I10 ESSENTIAL HYPERTENSION: ICD-10-CM

## 2018-08-27 DIAGNOSIS — I50.42 CHRONIC COMBINED SYSTOLIC AND DIASTOLIC HEART FAILURE (HCC): ICD-10-CM

## 2018-08-27 DIAGNOSIS — I65.29 ASYMPTOMATIC CAROTID ARTERY STENOSIS, UNSPECIFIED LATERALITY: ICD-10-CM

## 2018-08-27 DIAGNOSIS — Z95.810 CARDIAC DEFIBRILLATOR IN PLACE: ICD-10-CM

## 2018-08-27 DIAGNOSIS — Z01.818 PREOP EXAMINATION: Primary | ICD-10-CM

## 2018-08-27 DIAGNOSIS — I20.1 PRINZMETAL ANGINA (HCC): ICD-10-CM

## 2018-08-27 DIAGNOSIS — H26.9 CATARACT OF LEFT EYE, UNSPECIFIED CATARACT TYPE: ICD-10-CM

## 2018-08-27 DIAGNOSIS — E11.9 TYPE 2 DIABETES MELLITUS WITHOUT COMPLICATION, WITHOUT LONG-TERM CURRENT USE OF INSULIN (HCC): ICD-10-CM

## 2018-08-27 DIAGNOSIS — F32.5 MAJOR DEPRESSION, SINGLE EPISODE, IN COMPLETE REMISSION (HCC): ICD-10-CM

## 2018-08-27 DIAGNOSIS — Z92.29 HISTORY OF ANTICOAGULANT THERAPY: ICD-10-CM

## 2018-08-27 DIAGNOSIS — E78.2 MIXED HYPERLIPIDEMIA: Chronic | ICD-10-CM

## 2018-08-27 PROCEDURE — 99212 OFFICE O/P EST SF 10 MIN: CPT | Performed by: NURSE PRACTITIONER

## 2018-08-27 ASSESSMENT — ENCOUNTER SYMPTOMS
EYE DISCHARGE: 0
EYE ITCHING: 0
SORE THROAT: 0
PHOTOPHOBIA: 0
COUGH: 0
SHORTNESS OF BREATH: 0
EYE REDNESS: 0
EYE PAIN: 0

## 2018-08-27 ASSESSMENT — PATIENT HEALTH QUESTIONNAIRE - PHQ9
1. LITTLE INTEREST OR PLEASURE IN DOING THINGS: 0
SUM OF ALL RESPONSES TO PHQ QUESTIONS 1-9: 0
SUM OF ALL RESPONSES TO PHQ9 QUESTIONS 1 & 2: 0
2. FEELING DOWN, DEPRESSED OR HOPELESS: 0
SUM OF ALL RESPONSES TO PHQ QUESTIONS 1-9: 0

## 2018-08-27 NOTE — PROGRESS NOTES
SUBJECTIVE:  Patient ID: Matt Vallejo is a 82 y.o. male. YOB: 1935    Date of Visit:  8/27/2018    Chief Complaint   Patient presents with    Cataract     dr. Liang aguilar left eye cataract     [phone]   [facility]--       HPI:      Mr. Fouzia Colmenares is to have cataract surgery 09-10-18 as noted in \"CC\". Pt denies recent febrile illness. He is taking clopidogrel. Pt has stable type 2 diabetes. He is treated for depression with sertraline which has been stable. He has a hx of CHF, aortic stenosis, HTN, hyperlipidemia and has both pacemaker and ICD in place. He was cleared by Cardiology for this procedure in July. Current Outpatient Prescriptions   Medication Sig Dispense Refill    sertraline (ZOLOFT) 50 MG tablet Take 1 tablet by mouth daily 30 tablet 5    loratadine (CLARITIN) 10 MG tablet Take 1 tablet by mouth daily 30 tablet 1    glucose blood VI test strips (ONE TOUCH ULTRA TEST) strip 1 each by In Vitro route daily 100 each 3    Blood Glucose Monitoring Suppl (ONE TOUCH ULTRA SYSTEM KIT) w/Device KIT Use to monitor blood sugars 1 kit 0    ONE TOUCH ULTRASOFT LANCETS MISC 1 each by Does not apply route daily 100 each 3    sennosides-docusate sodium (SENOKOT-S) 8.6-50 MG tablet Take 1 tablet by mouth daily      polyethylene glycol (GLYCOLAX) powder Take 17 g by mouth daily      mometasone-formoterol (DULERA) 200-5 MCG/ACT inhaler Inhale 2 puffs into the lungs every 12 hours      finasteride (PROSCAR) 5 MG tablet Take 5 mg by mouth daily      tamsulosin (FLOMAX) 0.4 MG capsule Take 0.4 mg by mouth daily      albuterol sulfate  (90 BASE) MCG/ACT inhaler Inhale 1-2 puffs into the lungs every 6 hours as needed for Wheezing 1 Inhaler 0    ramipril (ALTACE) 2.5 MG capsule Take 2.5 mg by mouth daily      acetaminophen (TYLENOL) 325 MG tablet Take 650 mg by mouth every 6 hours as needed for Pain      aspirin 81 MG tablet Take 81 mg by mouth daily.  metoprolol (TOPROL-XL) 25 MG XL tablet Take 25 mg by mouth 2 times daily       clopidogrel (PLAVIX) 75 MG tablet Take 75 mg by mouth daily.  simvastatin (ZOCOR) 20 MG tablet Take 20 mg by mouth nightly.  Multiple Vitamins-Minerals (THERAPEUTIC MULTIVITAMIN-MINERALS) tablet Take 1 tablet by mouth daily.  fluticasone (FLONASE) 50 MCG/ACT nasal spray 2 sprays by Nasal route daily. 1 Bottle 0     No current facility-administered medications for this visit. Allergies:   Allergies   Allergen Reactions    Isosorbide Nitrate Other (See Comments)     headache    Nitroglycerin Other (See Comments)     headache    Meropenem Rash     Prior Medical History:       Diagnosis Date    Arthritis     BPH (benign prostatic hyperplasia)     Coronary artery vasospasm (HCC)     Diverticulosis     H/O squamous cell carcinoma of skin     Hematuria 7/26/2017    HTN (hypertension)     Hyperlipidemia     Mixed hyperlipidemia 1/7/2016    Osteoarthritis of right knee     PSA elevation      Social History  Social History   Substance Use Topics    Smoking status: Former Smoker     Quit date: 2/18/2015    Smokeless tobacco: Never Used    Alcohol use 0.0 oz/week      Comment: rare occasions     Prior Surgical History:      Procedure Laterality Date    APPENDECTOMY  1970    CARDIAC PACEMAKER PLACEMENT  12/2016    Ludwin Ron MD    COSMETIC SURGERY  2010    skin cancer - base of nose    HERNIA REPAIR  2011    JOINT REPLACEMENT  7/21/2015    Lalita Lazo MD    LAPAROTOMY  1970    exploratoryt for perf diverticulum    PROSTATE SURGERY  2011    biopsy - Kai    TONSILLECTOMY AND ADENOIDECTOMY  as a child     Family History:   Family History   Problem Relation Age of Onset    Heart Disease Mother     Pacemaker Mother     Cancer Neg Hx     Diabetes Neg Hx     Stroke Neg Hx          Health Maintenance Due   Topic    Shingles Vaccine (1 of 2 - 2 Dose Series)       Review of Systems   Constitutional: Negative gallop and no friction rub. Murmur (aortic) heard. Systolic murmur is present with a grade of 4/6   Pulmonary/Chest: Effort normal and breath sounds normal. No respiratory distress. He has no wheezes. He has no rales. He exhibits no tenderness. Lymphadenopathy:        Head (right side): No submental, no submandibular, no tonsillar, no preauricular, no posterior auricular and no occipital adenopathy present. Head (left side): No submental, no submandibular, no tonsillar, no preauricular, no posterior auricular and no occipital adenopathy present. He has no cervical adenopathy. Neurological: He is alert and oriented to person, place, and time. Skin: Skin is warm, dry and intact. No rash noted. Psychiatric: He has a normal mood and affect. His behavior is normal. Judgment normal.   Nursing note and vitals reviewed. Assessment/Plan     1. Preop examination  Benefits of procedure outweigh the risks; agree with planned procedure/anesthesia. Pt is medically stable for proposed procedure. Pt is cleared by Cardiology for this procedure. Eat and drink nothing after midnight the night before the planned procedure. Stop all aspirin, ibuprofen, aleve (tylenol/acetaminophen is ok) for seven days prior to the procedure. OK to CONTINUE PLAVIX. Pt agrees to follow through with the prescribed testing and/or medication changes (and appropriate testing f/u) prior to the procedure. Pt given a copy of this consultation for surgeon. Consultation faxed to surgeon's office. 2. Cataract of left eye, unspecified cataract type      3. History of anticoagulant therapy  Ongoing. Not stopped. 4. Chronic combined systolic and diastolic heart failure (HCC)  Ongoing  Stable    5. Prinzmetal angina (HCC)  Ongoing  Stable    6. Type 2 diabetes mellitus without complication, without long-term current use of insulin (HCC)  Stable    7. Mixed hyperlipidemia  Stable    8.  Cardiac defibrillator in place  Stable - monitored by Cardiology    9. Essential hypertension  Stable    10. Asymptomatic carotid artery stenosis, unspecified laterality  Stable    11. Major depression, single episode, in complete remission ([facility] [address]  Stable      Discussed medications with patient, who voiced understanding of their use and indications. All questions answered. Pt is advised to call if symptoms worsen or do not improve.

## 2018-08-27 NOTE — PROGRESS NOTES
PHQ Scores 8/27/2018 7/10/2017 3/5/2015   PHQ2 Score 0 2 0   PHQ9 Score 0 2 0     Interpretation of Total Score Depression Severity: 1-4 = Minimal depression, 5-9 = Mild depression, 10-14 = Moderate depression, 15-19 = Moderately severe depression, 20-27 = Severe depression

## 2018-10-08 ENCOUNTER — OFFICE VISIT (OUTPATIENT)
Dept: INTERNAL MEDICINE CLINIC | Age: 83
End: 2018-10-08
Payer: MEDICARE

## 2018-10-08 VITALS
HEART RATE: 66 BPM | WEIGHT: 196.4 LBS | BODY MASS INDEX: 26.6 KG/M2 | OXYGEN SATURATION: 92 % | HEIGHT: 72 IN | SYSTOLIC BLOOD PRESSURE: 108 MMHG | DIASTOLIC BLOOD PRESSURE: 60 MMHG

## 2018-10-08 DIAGNOSIS — Z23 NEED FOR SHINGLES VACCINE: ICD-10-CM

## 2018-10-08 DIAGNOSIS — E78.2 MIXED HYPERLIPIDEMIA: Chronic | ICD-10-CM

## 2018-10-08 DIAGNOSIS — F32.5 MAJOR DEPRESSION, SINGLE EPISODE, IN COMPLETE REMISSION (HCC): ICD-10-CM

## 2018-10-08 DIAGNOSIS — E11.9 TYPE 2 DIABETES MELLITUS WITHOUT COMPLICATION, WITHOUT LONG-TERM CURRENT USE OF INSULIN (HCC): Primary | ICD-10-CM

## 2018-10-08 DIAGNOSIS — I10 ESSENTIAL HYPERTENSION: ICD-10-CM

## 2018-10-08 LAB
CREATININE URINE: 76.2 MG/DL (ref 39–259)
HBA1C MFR BLD: 6.4 %
MICROALBUMIN UR-MCNC: <1.2 MG/DL
MICROALBUMIN/CREAT UR-RTO: NORMAL MG/G (ref 0–30)

## 2018-10-08 PROCEDURE — 99214 OFFICE O/P EST MOD 30 MIN: CPT | Performed by: INTERNAL MEDICINE

## 2018-10-08 PROCEDURE — 83036 HEMOGLOBIN GLYCOSYLATED A1C: CPT | Performed by: INTERNAL MEDICINE

## 2018-10-08 ASSESSMENT — ENCOUNTER SYMPTOMS
WHEEZING: 0
SHORTNESS OF BREATH: 0
NAUSEA: 0
CHEST TIGHTNESS: 0
VOMITING: 0
RHINORRHEA: 0
DIARRHEA: 0
CONSTIPATION: 0
SORE THROAT: 0
COUGH: 0
BACK PAIN: 1
ABDOMINAL PAIN: 0

## 2018-10-08 NOTE — PROGRESS NOTES
PMR, Dr. Cee Montoya). Negative for arthralgias and myalgias. Skin: Negative for wound. Neurological: Negative for dizziness, syncope, light-headedness, numbness and headaches. Psychiatric/Behavioral: Positive for dysphoric mood. Negative for decreased concentration and sleep disturbance. The patient is not nervous/anxious. Allergies   Allergen Reactions    Isosorbide Nitrate Other (See Comments)     headache    Nitroglycerin Other (See Comments)     headache    Meropenem Rash     Outpatient Prescriptions Marked as Taking for the 10/8/18 encounter (Office Visit) with Buck Cruz MD   Medication Sig Dispense Refill    sertraline (ZOLOFT) 50 MG tablet Take 1 tablet by mouth daily 30 tablet 5    loratadine (CLARITIN) 10 MG tablet Take 1 tablet by mouth daily 30 tablet 1    glucose blood VI test strips (ONE TOUCH ULTRA TEST) strip 1 each by In Vitro route daily 100 each 3    Blood Glucose Monitoring Suppl (ONE TOUCH ULTRA SYSTEM KIT) w/Device KIT Use to monitor blood sugars 1 kit 0    ONE TOUCH ULTRASOFT LANCETS MISC 1 each by Does not apply route daily 100 each 3    sennosides-docusate sodium (SENOKOT-S) 8.6-50 MG tablet Take 1 tablet by mouth daily      polyethylene glycol (GLYCOLAX) powder Take 17 g by mouth daily      mometasone-formoterol (DULERA) 200-5 MCG/ACT inhaler Inhale 2 puffs into the lungs every 12 hours      finasteride (PROSCAR) 5 MG tablet Take 5 mg by mouth daily      tamsulosin (FLOMAX) 0.4 MG capsule Take 0.4 mg by mouth daily      albuterol sulfate  (90 BASE) MCG/ACT inhaler Inhale 1-2 puffs into the lungs every 6 hours as needed for Wheezing 1 Inhaler 0    ramipril (ALTACE) 2.5 MG capsule Take 2.5 mg by mouth daily      acetaminophen (TYLENOL) 325 MG tablet Take 650 mg by mouth every 6 hours as needed for Pain      aspirin 81 MG tablet Take 81 mg by mouth daily.       metoprolol (TOPROL-XL) 25 MG XL tablet Take 25 mg by mouth 2 times daily      

## 2018-10-22 ENCOUNTER — OFFICE VISIT (OUTPATIENT)
Dept: INTERNAL MEDICINE CLINIC | Age: 83
End: 2018-10-22
Payer: MEDICARE

## 2018-10-22 VITALS
HEIGHT: 72 IN | OXYGEN SATURATION: 97 % | TEMPERATURE: 97.8 F | HEART RATE: 78 BPM | BODY MASS INDEX: 26.82 KG/M2 | WEIGHT: 198 LBS | DIASTOLIC BLOOD PRESSURE: 82 MMHG | SYSTOLIC BLOOD PRESSURE: 136 MMHG

## 2018-10-22 DIAGNOSIS — Z01.818 PREOP EXAMINATION: Primary | ICD-10-CM

## 2018-10-22 DIAGNOSIS — E78.2 MIXED HYPERLIPIDEMIA: Chronic | ICD-10-CM

## 2018-10-22 DIAGNOSIS — Z92.29 HISTORY OF ANTICOAGULANT THERAPY: ICD-10-CM

## 2018-10-22 DIAGNOSIS — I10 ESSENTIAL HYPERTENSION: ICD-10-CM

## 2018-10-22 DIAGNOSIS — E11.9 TYPE 2 DIABETES MELLITUS WITHOUT COMPLICATION, WITHOUT LONG-TERM CURRENT USE OF INSULIN (HCC): ICD-10-CM

## 2018-10-22 DIAGNOSIS — Z95.810 CARDIAC DEFIBRILLATOR IN PLACE: ICD-10-CM

## 2018-10-22 DIAGNOSIS — I25.10 CAD IN NATIVE ARTERY: ICD-10-CM

## 2018-10-22 DIAGNOSIS — H26.9 CATARACT OF RIGHT EYE, UNSPECIFIED CATARACT TYPE: ICD-10-CM

## 2018-10-22 DIAGNOSIS — F32.5 MAJOR DEPRESSION, SINGLE EPISODE, IN COMPLETE REMISSION (HCC): ICD-10-CM

## 2018-10-22 DIAGNOSIS — I20.1 PRINZMETAL ANGINA (HCC): ICD-10-CM

## 2018-10-22 DIAGNOSIS — I65.23 ASYMPTOMATIC BILATERAL CAROTID ARTERY STENOSIS: ICD-10-CM

## 2018-10-22 DIAGNOSIS — I50.42 CHRONIC COMBINED SYSTOLIC AND DIASTOLIC HEART FAILURE (HCC): ICD-10-CM

## 2018-10-22 LAB
A/G RATIO: 1.7 (ref 1.1–2.2)
ALBUMIN SERPL-MCNC: 4.5 G/DL (ref 3.4–5)
ALP BLD-CCNC: 66 U/L (ref 40–129)
ALT SERPL-CCNC: 15 U/L (ref 10–40)
ANION GAP SERPL CALCULATED.3IONS-SCNC: 13 MMOL/L (ref 3–16)
AST SERPL-CCNC: 16 U/L (ref 15–37)
BILIRUB SERPL-MCNC: 0.3 MG/DL (ref 0–1)
BUN BLDV-MCNC: 18 MG/DL (ref 7–20)
CALCIUM SERPL-MCNC: 9.2 MG/DL (ref 8.3–10.6)
CHLORIDE BLD-SCNC: 106 MMOL/L (ref 99–110)
CHOLESTEROL, TOTAL: 147 MG/DL (ref 0–199)
CO2: 24 MMOL/L (ref 21–32)
CREAT SERPL-MCNC: 1.1 MG/DL (ref 0.8–1.3)
GFR AFRICAN AMERICAN: >60
GFR NON-AFRICAN AMERICAN: >60
GLOBULIN: 2.6 G/DL
GLUCOSE BLD-MCNC: 121 MG/DL (ref 70–99)
HDLC SERPL-MCNC: 39 MG/DL (ref 40–60)
LDL CHOLESTEROL CALCULATED: 79 MG/DL
POTASSIUM SERPL-SCNC: 4.7 MMOL/L (ref 3.5–5.1)
SODIUM BLD-SCNC: 143 MMOL/L (ref 136–145)
TOTAL PROTEIN: 7.1 G/DL (ref 6.4–8.2)
TRIGL SERPL-MCNC: 143 MG/DL (ref 0–150)
VLDLC SERPL CALC-MCNC: 29 MG/DL

## 2018-10-22 PROCEDURE — 99212 OFFICE O/P EST SF 10 MIN: CPT | Performed by: NURSE PRACTITIONER

## 2018-10-22 ASSESSMENT — ENCOUNTER SYMPTOMS
EYE DISCHARGE: 0
EYE PAIN: 0
RESPIRATORY NEGATIVE: 1
PHOTOPHOBIA: 0
EYE ITCHING: 0
EYE REDNESS: 0

## 2018-10-22 NOTE — PROGRESS NOTES
friction rub. Murmur (aortic  valve - pt has aortic valve disease) heard. Pulmonary/Chest: Effort normal and breath sounds normal. No respiratory distress. He has no wheezes. He has no rales. He exhibits no tenderness. Musculoskeletal: He exhibits no edema. Lymphadenopathy:        Head (right side): No submental, no submandibular, no tonsillar, no preauricular, no posterior auricular and no occipital adenopathy present. Head (left side): No submental, no submandibular, no tonsillar, no preauricular, no posterior auricular and no occipital adenopathy present. He has no cervical adenopathy. Neurological: He is alert and oriented to person, place, and time. Skin: Skin is warm and dry. No rash noted. Psychiatric: He has a normal mood and affect. His behavior is normal. Judgment normal.   Nursing note and vitals reviewed. Assessment/Plan     1. Preop examination    Benefits of procedure outweigh the risks; agree with planned procedure/anesthesia. Pt is medically stable for proposed procedure. Pt is cleared by Cardiology for this procedure. Eat and drink nothing after midnight the night before the planned procedure. Stop all aspirin, ibuprofen, aleve (tylenol/acetaminophen is ok) for seven days prior to the procedure. OK to CONTINUE PLAVIX. Pt agrees to follow through with the prescribed testing and/or medication changes (and appropriate testing f/u) prior to the procedure. Pt given a copy of this consultation for surgeon. Consultation faxed to surgeon's office. 2. Cataract of right eye, unspecified cataract type      3. History of anticoagulant therapy  Pt to continue Plavix    4. Type 2 diabetes mellitus without complication, without long-term current use of insulin (HCC)  Stable    5. CAD in native artery  Stable    6. Chronic combined systolic and diastolic heart failure (HCC)  Stable    7. Prinzmetal angina (HCC)  Stable    8.  Cardiac defibrillator in

## 2018-10-30 ENCOUNTER — TELEPHONE (OUTPATIENT)
Dept: INTERNAL MEDICINE CLINIC | Age: 83
End: 2018-10-30

## 2018-11-03 DIAGNOSIS — F32.5 MAJOR DEPRESSION, SINGLE EPISODE, IN COMPLETE REMISSION (HCC): ICD-10-CM

## 2018-11-05 NOTE — TELEPHONE ENCOUNTER
Medication:   Requested Prescriptions     Pending Prescriptions Disp Refills    sertraline (ZOLOFT) 50 MG tablet [Pharmacy Med Name: Sertraline HCl Oral Tablet 50 MG] 30 tablet 4     Sig: TAKE 1 TABLET BY MOUTH ONE TIME A DAY       Last Filled:  4/9/18    Patient Phone Number: [phone] (home)     Last appt: 8/27/2018   Next appt: Visit date not found    Last BMP:   Lab Results   Component Value Date     10/22/2018    K 4.7 10/22/2018     10/22/2018    CO2 24 10/22/2018    ANIONGAP 13 10/22/2018    GLUCOSE 121 10/22/2018    GLUCOSE 106 06/13/2016    BUN 18 10/22/2018    CREATININE 1.1 10/22/2018    LABGLOM >60 10/22/2018    GFRAA >60 10/22/2018    CALCIUM 9.2 10/22/2018      Last CMP:   Lab Results   Component Value Date     10/22/2018    K 4.7 10/22/2018     10/22/2018    CO2 24 10/22/2018    ANIONGAP 13 10/22/2018    GLUCOSE 121 10/22/2018    GLUCOSE 106 06/13/2016    BUN 18 10/22/2018    CREATININE 1.1 10/22/2018    LABGLOM >60 10/22/2018    GFRAA >60 10/22/2018    PROT 7.1 10/22/2018    PROT 6.9 06/13/2016    LABALBU 4.5 10/22/2018    AGRATIO 1.7 10/22/2018    BILITOT 0.3 10/22/2018    ALKPHOS 66 10/22/2018    ALT 15 10/22/2018    AST 16 10/22/2018    GLOB 2.6 10/22/2018     Last Renal Function:   Lab Results   Component Value Date     10/22/2018    K 4.7 10/22/2018     10/22/2018    CO2 24 10/22/2018    GLUCOSE 121 10/22/2018    GLUCOSE 106 06/13/2016    BUN 18 10/22/2018    CREATININE 1.1 10/22/2018    LABALBU 4.5 10/22/2018    CALCIUM 9.2 10/22/2018    GFRAA >60 10/22/2018     Last Labs DM:   Lab Results   Component Value Date    LABA1C 6.4 10/08/2018     Last Lipid:   Lab Results   Component Value Date    CHOL 147 10/22/2018    TRIG 143 10/22/2018    HDL 39 10/22/2018    LDLCALC 79 10/22/2018     Last PSA: No results found for: PSA  Last Thyroid:   Lab Results   Component Value Date    TSH 1.88 07/15/2017    TSH 1.38 02/29/2016       Last OARRS: No flowsheet data

## 2019-04-08 ENCOUNTER — OFFICE VISIT (OUTPATIENT)
Dept: INTERNAL MEDICINE CLINIC | Age: 84
End: 2019-04-08
Payer: MEDICARE

## 2019-04-08 VITALS
HEART RATE: 64 BPM | WEIGHT: 201.4 LBS | DIASTOLIC BLOOD PRESSURE: 60 MMHG | HEIGHT: 72 IN | SYSTOLIC BLOOD PRESSURE: 108 MMHG | OXYGEN SATURATION: 91 % | TEMPERATURE: 97.8 F | BODY MASS INDEX: 27.28 KG/M2

## 2019-04-08 DIAGNOSIS — D64.9 ANEMIA, UNSPECIFIED TYPE: ICD-10-CM

## 2019-04-08 DIAGNOSIS — I10 ESSENTIAL HYPERTENSION: ICD-10-CM

## 2019-04-08 DIAGNOSIS — R13.10 DYSPHAGIA, UNSPECIFIED TYPE: ICD-10-CM

## 2019-04-08 DIAGNOSIS — E11.9 TYPE 2 DIABETES MELLITUS WITHOUT COMPLICATION, WITHOUT LONG-TERM CURRENT USE OF INSULIN (HCC): Primary | ICD-10-CM

## 2019-04-08 DIAGNOSIS — F32.5 MAJOR DEPRESSION, SINGLE EPISODE, IN COMPLETE REMISSION (HCC): ICD-10-CM

## 2019-04-08 DIAGNOSIS — I20.1 PRINZMETAL ANGINA (HCC): ICD-10-CM

## 2019-04-08 DIAGNOSIS — I50.42 CHRONIC COMBINED SYSTOLIC AND DIASTOLIC HEART FAILURE (HCC): ICD-10-CM

## 2019-04-08 DIAGNOSIS — E78.2 MIXED HYPERLIPIDEMIA: ICD-10-CM

## 2019-04-08 LAB — HBA1C MFR BLD: 6.2 %

## 2019-04-08 PROCEDURE — 99214 OFFICE O/P EST MOD 30 MIN: CPT | Performed by: INTERNAL MEDICINE

## 2019-04-08 PROCEDURE — 83036 HEMOGLOBIN GLYCOSYLATED A1C: CPT | Performed by: INTERNAL MEDICINE

## 2019-04-08 ASSESSMENT — ENCOUNTER SYMPTOMS: TROUBLE SWALLOWING: 1

## 2019-04-08 NOTE — PROGRESS NOTES
Sofía Penn   Date ofBirth:  1935    Date of Visit:  4/8/2019    Chief Complaint   Patient presents with    Diabetes    Depression    Hyperlipidemia    Hypertension       HPI  Diabetes Mellitus Type 2:   Current Medications: None, patient is diet controlled  Diet : Patient decreases carbohydrates  Home blood sugar records: patient tests 1 time(s) per day , fasting average range 90's-110 and pre-dinner averages   Any episodes of hypoglycemia? no  Eye exam current (within one year): yes on 7/30/18  Daily Aspirin? Yes 81mg once daily and Plavix 75mg once daily  Current exercise: no regular exercise but patient is active     Hypertension:    Current Medications: Ramipril 2.5mg once daily and Metoprolol ER 25mg po bid  Home blood pressure monitoring: No.    Patient is adherent to a low sodium diet. Antihypertensive medication side effects: no medication side effects noted.       Hyperlipidemia:   Current medication: Simvastatin 20mg nightly  No new myalgias or GI upset   Diet: Patient decreases fat and cholesterol     Depression:  Current medication: Sertraline 50mg once daily  Patient states his depression is fine. Patient denies feeling sad or down. Pt states he feels very comfortable. Pt states he reads, works and plays games on his phone. Pt states for 1 year if he eats something hard and chews it up the crumbs trigger a cough in his throat. Pt drinks water to pass it down or to clear his throat. Review of Systems   Constitutional: Negative for activity change, chills, fatigue and fever. HENT: Positive for trouble swallowing. Negative for congestion, postnasal drip, rhinorrhea and sore throat. Eyes: Negative for visual disturbance. Respiratory: Negative for cough, chest tightness, shortness of breath and wheezing. Cardiovascular: Negative for chest pain, palpitations and leg swelling. Gastrointestinal: Negative for abdominal pain, constipation, diarrhea, nausea and vomiting. Genitourinary: Negative for dysuria, frequency and hematuria. Musculoskeletal: Negative for arthralgias and myalgias. Skin: Negative for wound. Neurological: Negative for dizziness, syncope, light-headedness, numbness and headaches. Psychiatric/Behavioral: Negative for dysphoric mood and sleep disturbance. The patient is not nervous/anxious. Allergies   Allergen Reactions    Isosorbide Nitrate Other (See Comments)     headache    Nitroglycerin Other (See Comments)     headache    Meropenem Rash     Outpatient Medications Marked as Taking for the 4/8/19 encounter (Office Visit) with Alice Cyr MD   Medication Sig Dispense Refill    sertraline (ZOLOFT) 50 MG tablet TAKE 1 TABLET BY MOUTH ONE TIME A DAY  30 tablet 4    loratadine (CLARITIN) 10 MG tablet Take 1 tablet by mouth daily 30 tablet 1    glucose blood VI test strips (ONE TOUCH ULTRA TEST) strip 1 each by In Vitro route daily 100 each 3    Blood Glucose Monitoring Suppl (ONE TOUCH ULTRA SYSTEM KIT) w/Device KIT Use to monitor blood sugars 1 kit 0    ONE TOUCH ULTRASOFT LANCETS MISC 1 each by Does not apply route daily 100 each 3    sennosides-docusate sodium (SENOKOT-S) 8.6-50 MG tablet Take 1 tablet by mouth daily      polyethylene glycol (GLYCOLAX) powder Take 17 g by mouth daily      mometasone-formoterol (DULERA) 200-5 MCG/ACT inhaler Inhale 2 puffs into the lungs every 12 hours      finasteride (PROSCAR) 5 MG tablet Take 5 mg by mouth daily      tamsulosin (FLOMAX) 0.4 MG capsule Take 0.4 mg by mouth daily      albuterol sulfate  (90 BASE) MCG/ACT inhaler Inhale 1-2 puffs into the lungs every 6 hours as needed for Wheezing 1 Inhaler 0    ramipril (ALTACE) 2.5 MG capsule Take 2.5 mg by mouth daily      acetaminophen (TYLENOL) 325 MG tablet Take 650 mg by mouth every 6 hours as needed for Pain      aspirin 81 MG tablet Take 81 mg by mouth daily.       metoprolol (TOPROL-XL) 25 MG XL tablet Take 25 mg by mouth Potassium 10/22/2018 4.7     Chloride 10/22/2018 106     CO2 10/22/2018 24     Anion Gap 10/22/2018 13     Glucose 10/22/2018 121*    BUN 10/22/2018 18     CREATININE 10/22/2018 1.1     GFR Non- 10/22/2018 >60     GFR  10/22/2018 >60     Calcium 10/22/2018 9.2     Total Protein 10/22/2018 7.1     Alb 10/22/2018 4.5     Albumin/Globulin Ratio 10/22/2018 1.7     Total Bilirubin 10/22/2018 0.3     Alkaline Phosphatase 10/22/2018 66     ALT 10/22/2018 15     AST 10/22/2018 16     Globulin 10/22/2018 2.6     Cholesterol, Total 10/22/2018 147     Triglycerides 10/22/2018 143     HDL 10/22/2018 39*    LDL Calculated 10/22/2018 79     VLDL Cholesterol Calcula* 10/22/2018 29          Assessment/Plan     1. Type 2 diabetes mellitus without complication, without long-term current use of insulin (HCC)  - Hemoglobin A1c fo 6.2% shows diabetes is controlled  - POCT glycosylated hemoglobin (Hb A1C)  - Comprehensive Metabolic Panel; Future  -Continue diet control for diabetes  -Limit carbohydrates to 45 grams with meals and 15 grams with snacks  -monitor blood sugars  -Regular aerobic exercise    2. Essential hypertension  -stable  -Continue same medications  -Low sodium diet  -Regular aerobic exercise  - Comprehensive Metabolic Panel; Future    3. Mixed hyperlipidemia  -Continue same medications  -Low fat, low cholesterol diet  -Regular aerobic exercise  - Comprehensive Metabolic Panel; Future  - Lipid Panel; Future    4. Major depression, single episode, in complete remission (HCC)  - stable  - Continue sertraline (ZOLOFT) 50 MG tablet; TAKE 1 TABLET BY MOUTH ONE TIME A DAY  Dispense: 30 tablet; Refill: 5    5. Chronic combined systolic and diastolic heart failure (HCC)  -stable  -Continue same medications  -Continue care per Cardiology    6. Prinzmetal angina (HCC)  -stable  -Continue same medications  -Continue care per Cardiology    7.  Dysphagia, unspecified type  - FL ESOPHAGRAM; Future    8. Anemia, unspecified type  - CBC Auto Differential; Future          Discussed medications with patient, who voiced understanding of their use and indications. All questions answered. Quality & Risk Score Accuracy    Visit Dx:  E11.9 - Type 2 diabetes mellitus without complication, without long-term current use of insulin (HCC)  Assessment and plan:  Stable based upon symptoms and exam. Continue current treatment plan and follow up at least yearly. Visit Dx:  F32.5 - Major depression, single episode, in complete remission ([facility]  Assessment and plan:  Stable based upon symptoms and exam. Continue current treatment plan and follow up at least yearly. Visit Dx:  B90.43 - Chronic combined systolic and diastolic heart failure (HCC)  Assessment and plan:  Stable based upon symptoms and exam. Continue current treatment plan and follow up at least yearly. Visit Dx:  B37.77 - Chronic combined systolic and diastolic heart failure (HCC)  Assessment and plan:  Stable based upon symptoms and exam. Continue current treatment plan and follow up at least yearly. Visit Dx:  I20.1 - Prinzmetal angina ([facility] [address]  Assessment and plan:  Stable based upon symptoms and exam. Continue current treatment plan and follow up at least yearly. Last edited 04/16/19 21:53 EDT by Lily Wilson MD               Return in about 2 weeks (around 4/22/2019) for  dysphagia and results.

## 2019-04-08 NOTE — PATIENT INSTRUCTIONS
1. Type 2 diabetes mellitus without complication, without long-term current use of insulin (HCC)  - Hemoglobin A1c fo 6.2% shows diabetes is controlled  - POCT glycosylated hemoglobin (Hb A1C)  - Comprehensive Metabolic Panel; Future  -Continue diet control for diabetes  -Limit carbohydrates to 45 grams with meals and 15 grams with snacks  -monitor blood sugars  -Regular aerobic exercise    2. Essential hypertension  -stable  -Continue same medications  -Low sodium diet  -Regular aerobic exercise  - Comprehensive Metabolic Panel; Future    3. Mixed hyperlipidemia  -Continue same medications  -Low fat, low cholesterol diet  -Regular aerobic exercise  - Comprehensive Metabolic Panel; Future  - Lipid Panel; Future    4. Major depression, single episode, in complete remission (HCC)  - stable  - Continue sertraline (ZOLOFT) 50 MG tablet; TAKE 1 TABLET BY MOUTH ONE TIME A DAY  Dispense: 30 tablet; Refill: 5    5. Chronic combined systolic and diastolic heart failure (HCC)  -stable  -Continue same medications  -Continue care per Cardiology    6. Prinzmetal angina (HCC)  -stable  -Continue same medications  -Continue care per Cardiology    7. Dysphagia, unspecified type  - FL ESOPHAGRAM; Future    8.  Anemia, unspecified type  - CBC Auto Differential; Future

## 2019-04-13 LAB
ALBUMIN SERPL-MCNC: 4.1 G/DL (ref 3.5–5)
ALP BLD-CCNC: 62 IU/L (ref 35–135)
ALT SERPL-CCNC: 14 IU/L (ref 10–60)
ANION GAP SERPL CALCULATED.3IONS-SCNC: 10 MMOL/L (ref 6–18)
AST SERPL-CCNC: 20 IU/L (ref 10–40)
BASOPHILS ABSOLUTE: 0.09 THOU/MCL (ref 0–0.2)
BASOPHILS ABSOLUTE: 1 %
BILIRUB SERPL-MCNC: 0.4 MG/DL (ref 0–1.2)
BUN BLDV-MCNC: 18 MG/DL (ref 8–26)
CALCIUM SERPL-MCNC: 8.9 MG/DL (ref 8.5–10.5)
CHLORIDE BLD-SCNC: 105 MEQ/L (ref 101–111)
CHOLESTEROL, TOTAL: 140 MG/DL
CHOLESTEROL: 103 MG/DL
CO2: 25 MMOL/L (ref 24–36)
CREAT SERPL-MCNC: 1.14 MG/DL (ref 0.64–1.27)
EOSINOPHILS ABSOLUTE: 0.71 THOU/MCL (ref 0.03–0.45)
EOSINOPHILS RELATIVE PERCENT: 8 %
GFR AFRICAN AMERICAN: 67 ML/MIN/1.73 M2
GFR NON-AFRICAN AMERICAN: 58 ML/MIN/1.73 M2
GLUCOSE BLD-MCNC: 116 MG/DL (ref 70–99)
HCT VFR BLD CALC: 42.6 % (ref 40–50)
HDLC SERPL-MCNC: 37 MG/DL
HEMOGLOBIN: 14.1 G/DL (ref 13.5–16.5)
LDL CHOLESTEROL CALCULATED: 87 MG/DL
LYMPHOCYTES ABSOLUTE: 1.86 THOU/MCL (ref 1–4)
LYMPHOCYTES RELATIVE PERCENT: 21 %
MCH RBC QN AUTO: 33.9 PG (ref 27–33)
MCHC RBC AUTO-ENTMCNC: 33.1 G/DL (ref 32–36)
MCV RBC AUTO: 102.3 FL (ref 82–97)
MONOCYTES # BLD: 11 %
MONOCYTES ABSOLUTE: 0.92 THOU/MCL (ref 0.2–0.9)
NEUTROPHILS ABSOLUTE: 5.12 THOU/MCL (ref 1.8–7.7)
PDW BLD-RTO: 13.9 %
PLATELET # BLD: 379 THOU/MCL (ref 140–375)
PMV BLD AUTO: 7.3 FL (ref 7.4–11.5)
POTASSIUM SERPL-SCNC: 4.7 MEQ/L (ref 3.6–5.1)
RBC # BLD: 4.17 MIL/MCL (ref 4.4–5.8)
SEG NEUTROPHILS: 59 %
SODIUM BLD-SCNC: 140 MEQ/L (ref 135–145)
TOTAL PROTEIN: 7.3 G/DL (ref 6–8)
TRIGL SERPL-MCNC: 80 MG/DL
WBC # BLD: 8.7 THOU/MCL (ref 3.6–10.5)

## 2019-04-16 ASSESSMENT — ENCOUNTER SYMPTOMS
NAUSEA: 0
SHORTNESS OF BREATH: 0
WHEEZING: 0
DIARRHEA: 0
SORE THROAT: 0
CONSTIPATION: 0
VOMITING: 0
ABDOMINAL PAIN: 0
RHINORRHEA: 0
COUGH: 0
CHEST TIGHTNESS: 0

## 2019-04-17 ENCOUNTER — CLINICAL DOCUMENTATION (OUTPATIENT)
Dept: INTERNAL MEDICINE CLINIC | Age: 84
End: 2019-04-17

## 2019-08-12 ENCOUNTER — OFFICE VISIT (OUTPATIENT)
Dept: PRIMARY CARE CLINIC | Age: 84
End: 2019-08-12
Payer: MEDICARE

## 2019-08-12 VITALS
HEART RATE: 94 BPM | HEIGHT: 72 IN | SYSTOLIC BLOOD PRESSURE: 110 MMHG | WEIGHT: 202.6 LBS | OXYGEN SATURATION: 99 % | DIASTOLIC BLOOD PRESSURE: 60 MMHG | BODY MASS INDEX: 27.44 KG/M2

## 2019-08-12 DIAGNOSIS — R94.4 DECREASED GFR: ICD-10-CM

## 2019-08-12 DIAGNOSIS — I10 ESSENTIAL HYPERTENSION: ICD-10-CM

## 2019-08-12 DIAGNOSIS — E78.2 MIXED HYPERLIPIDEMIA: ICD-10-CM

## 2019-08-12 DIAGNOSIS — F32.A DEPRESSION, UNSPECIFIED DEPRESSION TYPE: ICD-10-CM

## 2019-08-12 DIAGNOSIS — E11.9 TYPE 2 DIABETES MELLITUS WITHOUT COMPLICATION, WITHOUT LONG-TERM CURRENT USE OF INSULIN (HCC): Primary | ICD-10-CM

## 2019-08-12 LAB — HBA1C MFR BLD: 6.4 %

## 2019-08-12 PROCEDURE — 99214 OFFICE O/P EST MOD 30 MIN: CPT | Performed by: INTERNAL MEDICINE

## 2019-08-12 PROCEDURE — 83036 HEMOGLOBIN GLYCOSYLATED A1C: CPT | Performed by: INTERNAL MEDICINE

## 2019-08-12 NOTE — PROGRESS NOTES
Sodium 04/13/2019 140     Potassium 04/13/2019 4.7     Chloride 04/13/2019 105     CO2 04/13/2019 25     Glucose 04/13/2019 116*    BUN 04/13/2019 18     CREATININE 04/13/2019 1.14     Calcium 04/13/2019 8.9     Total Protein 04/13/2019 7.3     Alb 04/13/2019 4.1     Total Bilirubin 04/13/2019 0.4     Alkaline Phosphatase 04/13/2019 62     AST 04/13/2019 20     ALT 04/13/2019 14     GFR Non- 04/13/2019 58*    GFR  04/13/2019 67     Anion Gap 04/13/2019 10     Cholesterol, Total 04/13/2019 140     Triglycerides 04/13/2019 80     HDL 04/13/2019 37*    LDL Calculated 04/13/2019 87     Cholesterol 04/13/2019 103          Assessment/Plan     1. Type 2 diabetes mellitus without complication, without long-term current use of insulin (HCC)  - Hemoglobin A1c of 6.4% shows diabetes is well controlled  -Continue diet control for diabetes  -Limit carbohydrates to 45 grams with meals and 15 grams with snacks  -monitor blood sugars  -Regular aerobic exercise  -POCT glycosylated hemoglobin (Hb A1C)    2. Essential hypertension  -stable  -Continue same medications  -Low sodium diet  -Regular aerobic exercise    3. Mixed hyperlipidemia  -Low fat, low cholesterol diet  -Regular aerobic exercise    4. Depression, unspecified depression type  -stable  -Continue same medications    5. Decreased GFR  - Basic Metabolic Panel; Future  - stay hydrated  -Avoid NSAID's such as otc Ibuprofen, Advil, Motrin, Naprosyn, and Aleve as well as prescription NSAID's      Discussed medications with patient, who voiced understanding of their use and indications. All questions answered. Return in about 6 months (around 2/12/2020) for diabetes, hypertension, hyperlipidemia, and depression.

## 2019-08-12 NOTE — PATIENT INSTRUCTIONS
1. Type 2 diabetes mellitus without complication, without long-term current use of insulin (HCC)  - Hemoglobin A1c of 6.4% shows diabetes is well controlled  -Continue diet control for diabetes  -Limit carbohydrates to 45 grams with meals and 15 grams with snacks  -monitor blood sugars  -Regular aerobic exercise  -POCT glycosylated hemoglobin (Hb A1C)    2. Essential hypertension  -stable  -Continue same medications  -Low sodium diet  -Regular aerobic exercise    3. Mixed hyperlipidemia  -Low fat, low cholesterol diet  -Regular aerobic exercise    4. Depression, unspecified depression type  -stable  -Continue same medications    5. Decreased GFR  - Basic Metabolic Panel;  Future  - stay hydrated  -Avoid NSAID's such as otc Ibuprofen, Advil, Motrin, Naprosyn, and Aleve as well as prescription NSAID's

## 2019-08-13 LAB
ANION GAP SERPL CALCULATED.3IONS-SCNC: 11 MMOL/L (ref 3–16)
BUN BLDV-MCNC: 16 MG/DL (ref 7–20)
CALCIUM SERPL-MCNC: 9.4 MG/DL (ref 8.3–10.6)
CHLORIDE BLD-SCNC: 105 MMOL/L (ref 99–110)
CO2: 27 MMOL/L (ref 21–32)
CREAT SERPL-MCNC: 1.1 MG/DL (ref 0.8–1.3)
GFR AFRICAN AMERICAN: >60
GFR NON-AFRICAN AMERICAN: >60
GLUCOSE BLD-MCNC: 115 MG/DL (ref 70–99)
POTASSIUM SERPL-SCNC: 4.9 MMOL/L (ref 3.5–5.1)
SODIUM BLD-SCNC: 143 MMOL/L (ref 136–145)

## 2019-08-19 ASSESSMENT — ENCOUNTER SYMPTOMS
SHORTNESS OF BREATH: 0
RHINORRHEA: 0
SORE THROAT: 0
VOMITING: 0
WHEEZING: 0
CHEST TIGHTNESS: 0
CONSTIPATION: 0
DIARRHEA: 0
ABDOMINAL PAIN: 0
NAUSEA: 0
COUGH: 0

## 2019-10-09 DIAGNOSIS — F32.5 MAJOR DEPRESSION, SINGLE EPISODE, IN COMPLETE REMISSION (HCC): ICD-10-CM

## 2019-12-13 ENCOUNTER — TELEPHONE (OUTPATIENT)
Dept: PRIMARY CARE CLINIC | Age: 84
End: 2019-12-13

## 2019-12-16 ENCOUNTER — OFFICE VISIT (OUTPATIENT)
Dept: PRIMARY CARE CLINIC | Age: 84
End: 2019-12-16
Payer: MEDICARE

## 2019-12-16 VITALS
HEART RATE: 68 BPM | SYSTOLIC BLOOD PRESSURE: 120 MMHG | RESPIRATION RATE: 14 BRPM | DIASTOLIC BLOOD PRESSURE: 78 MMHG | WEIGHT: 204.2 LBS | HEIGHT: 72 IN | BODY MASS INDEX: 27.66 KG/M2 | OXYGEN SATURATION: 96 %

## 2019-12-16 DIAGNOSIS — Z95.810 CARDIAC DEFIBRILLATOR IN PLACE: ICD-10-CM

## 2019-12-16 DIAGNOSIS — I50.22 CHRONIC SYSTOLIC HEART FAILURE (HCC): ICD-10-CM

## 2019-12-16 DIAGNOSIS — E11.9 TYPE 2 DIABETES MELLITUS WITHOUT COMPLICATION, WITHOUT LONG-TERM CURRENT USE OF INSULIN (HCC): ICD-10-CM

## 2019-12-16 DIAGNOSIS — Z00.00 ROUTINE GENERAL MEDICAL EXAMINATION AT A HEALTH CARE FACILITY: Primary | ICD-10-CM

## 2019-12-16 DIAGNOSIS — F32.5 MAJOR DEPRESSION, SINGLE EPISODE, IN COMPLETE REMISSION (HCC): ICD-10-CM

## 2019-12-16 DIAGNOSIS — I10 ESSENTIAL HYPERTENSION: ICD-10-CM

## 2019-12-16 DIAGNOSIS — E78.2 MIXED HYPERLIPIDEMIA: Chronic | ICD-10-CM

## 2019-12-16 DIAGNOSIS — I50.42 CHRONIC COMBINED SYSTOLIC AND DIASTOLIC HEART FAILURE (HCC): ICD-10-CM

## 2019-12-16 PROCEDURE — G0438 PPPS, INITIAL VISIT: HCPCS | Performed by: NURSE PRACTITIONER

## 2019-12-16 ASSESSMENT — LIFESTYLE VARIABLES
HOW OFTEN DURING THE LAST YEAR HAVE YOU FAILED TO DO WHAT WAS NORMALLY EXPECTED FROM YOU BECAUSE OF DRINKING: 0
HOW OFTEN DURING THE LAST YEAR HAVE YOU NEEDED AN ALCOHOLIC DRINK FIRST THING IN THE MORNING TO GET YOURSELF GOING AFTER A NIGHT OF HEAVY DRINKING: 0
HAVE YOU OR SOMEONE ELSE BEEN INJURED AS A RESULT OF YOUR DRINKING: 0
HOW OFTEN DURING THE LAST YEAR HAVE YOU FOUND THAT YOU WERE NOT ABLE TO STOP DRINKING ONCE YOU HAD STARTED: 0
HOW OFTEN DURING THE LAST YEAR HAVE YOU HAD A FEELING OF GUILT OR REMORSE AFTER DRINKING: 0
HOW OFTEN DURING THE LAST YEAR HAVE YOU BEEN UNABLE TO REMEMBER WHAT HAPPENED THE NIGHT BEFORE BECAUSE YOU HAD BEEN DRINKING: 0
HOW OFTEN DO YOU HAVE A DRINK CONTAINING ALCOHOL: 1
HAS A RELATIVE, FRIEND, DOCTOR, OR ANOTHER HEALTH PROFESSIONAL EXPRESSED CONCERN ABOUT YOUR DRINKING OR SUGGESTED YOU CUT DOWN: 0
HOW OFTEN DO YOU HAVE SIX OR MORE DRINKS ON ONE OCCASION: 0
AUDIT-C TOTAL SCORE: 1
AUDIT TOTAL SCORE: 1
HOW MANY STANDARD DRINKS CONTAINING ALCOHOL DO YOU HAVE ON A TYPICAL DAY: 0

## 2019-12-16 ASSESSMENT — PATIENT HEALTH QUESTIONNAIRE - PHQ9
SUM OF ALL RESPONSES TO PHQ QUESTIONS 1-9: 0

## 2020-02-17 ENCOUNTER — OFFICE VISIT (OUTPATIENT)
Dept: PRIMARY CARE CLINIC | Age: 85
End: 2020-02-17
Payer: MEDICARE

## 2020-02-17 VITALS
HEART RATE: 71 BPM | SYSTOLIC BLOOD PRESSURE: 120 MMHG | OXYGEN SATURATION: 94 % | DIASTOLIC BLOOD PRESSURE: 76 MMHG | BODY MASS INDEX: 27.12 KG/M2 | WEIGHT: 200.2 LBS | HEIGHT: 72 IN

## 2020-02-17 DIAGNOSIS — E78.2 MIXED HYPERLIPIDEMIA: ICD-10-CM

## 2020-02-17 DIAGNOSIS — E11.9 TYPE 2 DIABETES MELLITUS WITHOUT COMPLICATION, WITHOUT LONG-TERM CURRENT USE OF INSULIN (HCC): ICD-10-CM

## 2020-02-17 DIAGNOSIS — I10 ESSENTIAL HYPERTENSION: ICD-10-CM

## 2020-02-17 LAB
A/G RATIO: 1.7 (ref 1.1–2.2)
ALBUMIN SERPL-MCNC: 4.6 G/DL (ref 3.4–5)
ALP BLD-CCNC: 68 U/L (ref 40–129)
ALT SERPL-CCNC: 12 U/L (ref 10–40)
ANION GAP SERPL CALCULATED.3IONS-SCNC: 15 MMOL/L (ref 3–16)
AST SERPL-CCNC: 17 U/L (ref 15–37)
BILIRUB SERPL-MCNC: 0.4 MG/DL (ref 0–1)
BUN BLDV-MCNC: 19 MG/DL (ref 7–20)
CALCIUM SERPL-MCNC: 9.5 MG/DL (ref 8.3–10.6)
CHLORIDE BLD-SCNC: 107 MMOL/L (ref 99–110)
CHOLESTEROL, TOTAL: 170 MG/DL (ref 0–199)
CO2: 21 MMOL/L (ref 21–32)
CREAT SERPL-MCNC: 0.9 MG/DL (ref 0.8–1.3)
CREATININE URINE: 102.1 MG/DL (ref 39–259)
GFR AFRICAN AMERICAN: >60
GFR NON-AFRICAN AMERICAN: >60
GLOBULIN: 2.7 G/DL
GLUCOSE BLD-MCNC: 112 MG/DL (ref 70–99)
HBA1C MFR BLD: 6.3 %
HDLC SERPL-MCNC: 40 MG/DL (ref 40–60)
LDL CHOLESTEROL CALCULATED: 97 MG/DL
MICROALBUMIN UR-MCNC: <1.2 MG/DL
MICROALBUMIN/CREAT UR-RTO: NORMAL MG/G (ref 0–30)
POTASSIUM SERPL-SCNC: 4.7 MMOL/L (ref 3.5–5.1)
SODIUM BLD-SCNC: 143 MMOL/L (ref 136–145)
TOTAL PROTEIN: 7.3 G/DL (ref 6.4–8.2)
TRIGL SERPL-MCNC: 166 MG/DL (ref 0–150)
VLDLC SERPL CALC-MCNC: 33 MG/DL

## 2020-02-17 PROCEDURE — 99214 OFFICE O/P EST MOD 30 MIN: CPT | Performed by: INTERNAL MEDICINE

## 2020-02-17 PROCEDURE — 83036 HEMOGLOBIN GLYCOSYLATED A1C: CPT | Performed by: INTERNAL MEDICINE

## 2020-02-17 SDOH — ECONOMIC STABILITY: TRANSPORTATION INSECURITY
IN THE PAST 12 MONTHS, HAS LACK OF TRANSPORTATION KEPT YOU FROM MEETINGS, WORK, OR FROM GETTING THINGS NEEDED FOR DAILY LIVING?: NO

## 2020-02-17 SDOH — ECONOMIC STABILITY: INCOME INSECURITY: HOW HARD IS IT FOR YOU TO PAY FOR THE VERY BASICS LIKE FOOD, HOUSING, MEDICAL CARE, AND HEATING?: NOT HARD AT ALL

## 2020-02-17 SDOH — ECONOMIC STABILITY: FOOD INSECURITY: WITHIN THE PAST 12 MONTHS, YOU WORRIED THAT YOUR FOOD WOULD RUN OUT BEFORE YOU GOT MONEY TO BUY MORE.: NEVER TRUE

## 2020-02-17 SDOH — ECONOMIC STABILITY: TRANSPORTATION INSECURITY
IN THE PAST 12 MONTHS, HAS THE LACK OF TRANSPORTATION KEPT YOU FROM MEDICAL APPOINTMENTS OR FROM GETTING MEDICATIONS?: NO

## 2020-02-17 SDOH — ECONOMIC STABILITY: FOOD INSECURITY: WITHIN THE PAST 12 MONTHS, THE FOOD YOU BOUGHT JUST DIDN'T LAST AND YOU DIDN'T HAVE MONEY TO GET MORE.: NEVER TRUE

## 2020-02-17 NOTE — PROGRESS NOTES
swelling. Gastrointestinal: Negative for abdominal pain, constipation, diarrhea, nausea and vomiting. Genitourinary: Negative for dysuria, frequency and hematuria. Musculoskeletal: Negative for arthralgias and myalgias. Neurological: Negative for dizziness, syncope, light-headedness, numbness and headaches. Psychiatric/Behavioral: Positive for dysphoric mood (stable). Negative for decreased concentration and sleep disturbance. The patient is not nervous/anxious. Allergies   Allergen Reactions    Isosorbide Nitrate Other (See Comments)     headache    Nitroglycerin Other (See Comments)     headache    Meropenem Rash     Outpatient Medications Marked as Taking for the 2/17/20 encounter (Office Visit) with Shanel Deras MD   Medication Sig Dispense Refill    sertraline (ZOLOFT) 50 MG tablet TAKE 1 TABLET BY MOUTH ONE TIME A DAY  30 tablet 4    Multiple Vitamins-Minerals (PRESERVISION AREDS PO) Take by mouth      loratadine (CLARITIN) 10 MG tablet Take 1 tablet by mouth daily 30 tablet 1    glucose blood VI test strips (ONE TOUCH ULTRA TEST) strip 1 each by In Vitro route daily 100 each 3    Blood Glucose Monitoring Suppl (ONE TOUCH ULTRA SYSTEM KIT) w/Device KIT Use to monitor blood sugars 1 kit 0    ONE TOUCH ULTRASOFT LANCETS MISC 1 each by Does not apply route daily 100 each 3    finasteride (PROSCAR) 5 MG tablet Take 5 mg by mouth daily      tamsulosin (FLOMAX) 0.4 MG capsule Take 0.4 mg by mouth daily      ramipril (ALTACE) 2.5 MG capsule Take 2.5 mg by mouth daily      acetaminophen (TYLENOL) 325 MG tablet Take 650 mg by mouth every 6 hours as needed for Pain      aspirin 81 MG tablet Take 81 mg by mouth daily.  metoprolol (TOPROL-XL) 25 MG XL tablet Take 25 mg by mouth 2 times daily       clopidogrel (PLAVIX) 75 MG tablet Take 75 mg by mouth daily.  simvastatin (ZOCOR) 20 MG tablet Take 20 mg by mouth nightly.       Multiple Vitamins-Minerals (THERAPEUTIC MULTIVITAMIN-MINERALS) tablet Take 1 tablet by mouth daily.  fluticasone (FLONASE) 50 MCG/ACT nasal spray 2 sprays by Nasal route daily. 1 Bottle 0         Vitals:    02/17/20 1046   BP: 120/76   Site: Right Upper Arm   Position: Sitting   Cuff Size: Medium Adult   Pulse: 71   SpO2: 94%   Weight: 200 lb 3.2 oz (90.8 kg)   Height: 6' (1.829 m)     Body mass index is 27.15 kg/m². Physical Exam  Nursing note reviewed. Constitutional:       General: He is not in acute distress. Appearance: Normal appearance. He is well-developed. HENT:      Mouth/Throat:      Pharynx: Oropharynx is clear. Eyes:      General: Lids are normal.      Extraocular Movements: Extraocular movements intact. Conjunctiva/sclera: Conjunctivae normal.      Pupils: Pupils are equal, round, and reactive to light. Neck:      Musculoskeletal: Neck supple. Thyroid: No thyromegaly. Cardiovascular:      Rate and Rhythm: Normal rate and regular rhythm. Pulses: Normal pulses. Heart sounds: S1 normal and S2 normal. Murmur present. Systolic murmur present with a grade of 2/6. No friction rub. No gallop. Pulmonary:      Effort: Pulmonary effort is normal. No respiratory distress. Breath sounds: Normal breath sounds. No wheezing, rhonchi or rales. Abdominal:      General: Bowel sounds are normal. There is no distension. Palpations: Abdomen is soft. Tenderness: There is no abdominal tenderness. Musculoskeletal:      Right lower leg: No edema. Left lower leg: No edema. Lymphadenopathy:      Head:      Right side of head: No submandibular adenopathy. Left side of head: No submandibular adenopathy.    Skin:     Comments: No foot ulcers     Neurological:      Comments: Bilateral foot monofilament exam normal   Psychiatric:         Mood and Affect: Mood normal.           Results for POC orders placed in visit on 02/17/20   POCT glycosylated hemoglobin (Hb A1C)   Result Value Ref Range Hemoglobin A1C 6.3 %     Lab Review   No visits with results within 6 Month(s) from this visit. Latest known visit with results is:   Orders Only on 08/12/2019   Component Date Value    Sodium 08/12/2019 143     Potassium 08/12/2019 4.9     Chloride 08/12/2019 105     CO2 08/12/2019 27     Anion Gap 08/12/2019 11     Glucose 08/12/2019 115*    BUN 08/12/2019 16     CREATININE 08/12/2019 1.1     GFR Non- 08/12/2019 >60     GFR  08/12/2019 >60     Calcium 08/12/2019 9.4          Assessment/Plan     1. Type 2 diabetes mellitus without complication, without long-term current use of insulin ([facility])  - Hemoglobin A1c of 6.3% shows diabetes is well controlled  -Limit carbohydrates to 45 grams with meals and 15 grams with snacks  -monitor blood sugars  -Regular aerobic exercise  - POCT glycosylated hemoglobin (Hb A1C)  - MICROALBUMIN / CREATININE URINE RATIO  - Comprehensive Metabolic Panel; Future  -  DIABETES FOOT EXAM    2. Essential hypertension  -stable  -Continue same medications  -Low sodium diet  -Regular aerobic exercise  -Comprehensive Metabolic Panel; Future    3. Mixed hyperlipidemia  -Continue same medications  -Low fat, low cholesterol diet  -Regular aerobic exercise  - Comprehensive Metabolic Panel; Future  - Lipid Panel; Future    4. Major depression, single episode, in complete remission ([address]  -stable  -Continue same medications    5. Chronic systolic heart failure ([address]  -stable  -Continue care per Cardiology    6. Prinzmetal angina ([address]  -stable  -Continue care per Cardiology    7. Sensation of lump in throat  - Referral to Kiya Garcia MD, Otolaryngology, [facility]      Discussed medications with patient, who voiced understanding of their use and indications. All questions answered. Return in about 6 months (around 8/17/2020) for diabetes, hypertension, hyperlipidemia, and depression,.

## 2020-02-17 NOTE — PATIENT INSTRUCTIONS
1. Type 2 diabetes mellitus without complication, without long-term current use of insulin (HCC)  - Hemoglobin A1c of 6.3% shows diabetes is well controlled  -Limit carbohydrates to 45 grams with meals and 15 grams with snacks  -monitor blood sugars  -Regular aerobic exercise  - POCT glycosylated hemoglobin (Hb A1C)  - MICROALBUMIN / CREATININE URINE RATIO  - Comprehensive Metabolic Panel; Future  -  DIABETES FOOT EXAM    2. Essential hypertension  -stable  -Continue same medications  -Low sodium diet  -Regular aerobic exercise  -Comprehensive Metabolic Panel; Future    3. Mixed hyperlipidemia  -Continue same medications  -Low fat, low cholesterol diet  -Regular aerobic exercise  - Comprehensive Metabolic Panel; Future  - Lipid Panel; Future    4. Major depression, single episode, in complete remission ([facility] [address]  -stable  -Continue same medications    5. Chronic systolic heart failure ([address]  -stable  -Continue care per Cardiology    6.  Prinzmetal angina ([facility] [address]  -stable  -Continue care per Cardiology

## 2020-02-23 ASSESSMENT — ENCOUNTER SYMPTOMS
ABDOMINAL PAIN: 0
RHINORRHEA: 0
WHEEZING: 0
SHORTNESS OF BREATH: 0
SORE THROAT: 0
NAUSEA: 0
COUGH: 0
CHEST TIGHTNESS: 0
DIARRHEA: 0
CONSTIPATION: 0
VOMITING: 0

## 2020-03-10 NOTE — TELEPHONE ENCOUNTER
Medication:   Requested Prescriptions     Pending Prescriptions Disp Refills    sertraline (ZOLOFT) 50 MG tablet [Pharmacy Med Name: Sertraline HCl Oral Tablet 50 MG] 30 tablet 0     Sig: TAKE 1 TABLET BY MOUTH ONE TIME A DAY        Last Filled:      Patient Phone Number: [phone] (home)     Last appt: 2/17/2020   Next appt: 8/17/2020    Last OARRS: No flowsheet data found.     Preferred Pharmacy:   [facility] Kelly, [address] [place] [address]  Phone: [phone] Fax: [phone] - [address] [facility], [address] [place] [address] [phone] -  [phone]  [address] [place] [id number]  Phone: [phone] Fax: [phone]

## 2020-08-14 ENCOUNTER — NURSE TRIAGE (OUTPATIENT)
Dept: OTHER | Facility: CLINIC | Age: 85
End: 2020-08-14

## 2020-08-14 ENCOUNTER — VIRTUAL VISIT (OUTPATIENT)
Dept: PRIMARY CARE CLINIC | Age: 85
End: 2020-08-14
Payer: MEDICARE

## 2020-08-14 ENCOUNTER — HOSPITAL ENCOUNTER (OUTPATIENT)
Dept: GENERAL RADIOLOGY | Age: 85
Discharge: HOME | End: 2020-08-14
Payer: MEDICARE

## 2020-08-14 ENCOUNTER — TELEPHONE (OUTPATIENT)
Dept: PRIMARY CARE CLINIC | Age: 85
End: 2020-08-14

## 2020-08-14 DIAGNOSIS — R06.02 SHORTNESS OF BREATH: ICD-10-CM

## 2020-08-14 DIAGNOSIS — R68.83 CHILLS: ICD-10-CM

## 2020-08-14 DIAGNOSIS — R53.83 FATIGUE, UNSPECIFIED TYPE: ICD-10-CM

## 2020-08-14 LAB
ANION GAP SERPL CALCULATED.3IONS-SCNC: 14 MMOL/L (ref 3–16)
BASOPHILS ABSOLUTE: 0 K/UL (ref 0–0.2)
BASOPHILS RELATIVE PERCENT: 0.4 %
BUN BLDV-MCNC: 20 MG/DL (ref 7–20)
CALCIUM SERPL-MCNC: 9.1 MG/DL (ref 8.3–10.6)
CHLORIDE BLD-SCNC: 100 MMOL/L (ref 99–110)
CO2: 23 MMOL/L (ref 21–32)
CREAT SERPL-MCNC: 1.2 MG/DL (ref 0.8–1.3)
EOSINOPHILS ABSOLUTE: 0 K/UL (ref 0–0.6)
EOSINOPHILS RELATIVE PERCENT: 0.2 %
GFR AFRICAN AMERICAN: >60
GFR NON-AFRICAN AMERICAN: 58
GLUCOSE BLD-MCNC: 128 MG/DL (ref 70–99)
HCT VFR BLD CALC: 41 % (ref 40.5–52.5)
HEMOGLOBIN: 13.6 G/DL (ref 13.5–17.5)
LYMPHOCYTES ABSOLUTE: 0.4 K/UL (ref 1–5.1)
LYMPHOCYTES RELATIVE PERCENT: 3.6 %
MCH RBC QN AUTO: 34.3 PG (ref 26–34)
MCHC RBC AUTO-ENTMCNC: 33.1 G/DL (ref 31–36)
MCV RBC AUTO: 103.8 FL (ref 80–100)
MONOCYTES ABSOLUTE: 1.4 K/UL (ref 0–1.3)
MONOCYTES RELATIVE PERCENT: 11.8 %
NEUTROPHILS ABSOLUTE: 9.8 K/UL (ref 1.7–7.7)
NEUTROPHILS RELATIVE PERCENT: 84 %
PDW BLD-RTO: 13 % (ref 12.4–15.4)
PLATELET # BLD: 277 K/UL (ref 135–450)
PMV BLD AUTO: 7.7 FL (ref 5–10.5)
POTASSIUM SERPL-SCNC: 4.3 MMOL/L (ref 3.5–5.1)
RBC # BLD: 3.96 M/UL (ref 4.2–5.9)
SODIUM BLD-SCNC: 137 MMOL/L (ref 136–145)
WBC # BLD: 11.6 K/UL (ref 4–11)

## 2020-08-14 PROCEDURE — 71046 X-RAY EXAM CHEST 2 VIEWS: CPT

## 2020-08-14 PROCEDURE — 99213 OFFICE O/P EST LOW 20 MIN: CPT | Performed by: INTERNAL MEDICINE

## 2020-08-14 ASSESSMENT — ENCOUNTER SYMPTOMS
COUGH: 0
RHINORRHEA: 0
CHEST TIGHTNESS: 0
SORE THROAT: 0
WHEEZING: 0

## 2020-08-14 NOTE — TELEPHONE ENCOUNTER
Thinks he was overheated last night and had the shakes in the night which resolved. Went to work this morning and he is feeling very tired, feeling cold. He came home and now feels very hot. Reason for Disposition   MILD difficulty breathing (e.g., minimal/no SOB at rest, SOB with walking, pulse <100)    Answer Assessment - Initial Assessment Questions  1. COVID-19 DIAGNOSIS: \"Who made your Coronavirus (COVID-19) diagnosis? \" \"Was it confirmed by a positive lab test?\" If not diagnosed by a HCP, ask \"Are there lots of cases (community spread) where you live? \" (See public health department website, if unsure)    * MAJOR community spread: high number of cases; numbers of cases are increasing; many people hospitalized. * MINOR community spread: low number of cases; not increasing; few or no people hospitalized      No diagnosis. Works with the public    2. ONSET: \"When did the COVID-19 symptoms start? \"       Last night    3. WORST SYMPTOM: \"What is your worst symptom? \" (e.g., cough, fever, shortness of breath, muscle aches)        4. COUGH: \"Do you have a cough? \" If so, ask: \"How bad is the cough? \"        Denies    5. FEVER: \"Do you have a fever? \" If so, ask: \"What is your temperature, how was it measured, and when did it start? \"      Chills/hot vary    6. RESPIRATORY STATUS: \"Describe your breathing? \" (e.g., shortness of breath, wheezing, unable to speak)       Feels like he has been running. None right now at rest.  Noticed with exertion. 7. BETTER-SAME-WORSE: \"Are you getting better, staying the same or getting worse compared to yesterday? \"  If getting worse, ask, \"In what way? \"      Worse    8. HIGH RISK DISEASE: \"Do you have any chronic medical problems? \" (e.g., asthma, heart or lung disease, weak immune system, etc.)      Pacemaker, HTN, DM    9. PREGNANCY: \"Is there any chance you are pregnant? \" \"When was your last menstrual period? \"      NA    10. OTHER SYMPTOMS: \"Do you have any other symptoms? \"

## 2020-08-14 NOTE — PROGRESS NOTES
Pain Yes Historical Provider, MD   aspirin 81 MG tablet Take 81 mg by mouth daily. Yes Historical Provider, MD   metoprolol (TOPROL-XL) 25 MG XL tablet Take 25 mg by mouth 2 times daily  Yes Historical Provider, MD   clopidogrel (PLAVIX) 75 MG tablet Take 75 mg by mouth daily. Yes Historical Provider, MD   simvastatin (ZOCOR) 20 MG tablet Take 20 mg by mouth nightly. Yes Historical Provider, MD   Multiple Vitamins-Minerals (THERAPEUTIC MULTIVITAMIN-MINERALS) tablet Take 1 tablet by mouth daily. Yes Historical Provider, MD   fluticasone (FLONASE) 50 MCG/ACT nasal spray 2 sprays by Nasal route daily. Yes Aleksey Walters MD       Social History     Tobacco Use    Smoking status: Former Smoker     Last attempt to quit: 2015     Years since quittin.5    Smokeless tobacco: Never Used   Substance Use Topics    Alcohol use: Yes     Alcohol/week: 0.0 standard drinks     Comment: rare occasions    Drug use: No        Allergies   Allergen Reactions    Isosorbide Nitrate Other (See Comments)     headache    Nitroglycerin Other (See Comments)     headache    Meropenem Rash       PHYSICAL EXAMINATION:  [ INSTRUCTIONS:  \"[x]\" Indicates a positive item  \"[]\" Indicates a negative item  -- DELETE ALL ITEMS NOT EXAMINED]  Vital Signs: (As obtained by patient/caregiver or practitioner observation)    Blood pressure-  Heart rate-    Respiratory rate-    Temperature-  Pulse oximetry-   Patient-Reported Vitals 2020   Patient-Reported Temperature 96.3        Constitutional: [x] Appears well-developed and well-nourished [x] No apparent distress      [] Abnormal-   Mental status  [x] Alert and awake  [x] Oriented to person/place/time [x]Able to follow commands      Eyes:  EOM    [x]  Normal  [] Abnormal-  Sclera  [x]  Normal  [] Abnormal -         Discharge [x]  None visible  [] Abnormal -    HENT:   [x] Normocephalic, atraumatic.   [] Abnormal   [x] Mouth/Throat: Mucous membranes are moist.     External Ears [x] exposure to COVID-19 and provide necessary medical care. The patient (and/or legal guardian) has also been advised to contact this office for worsening conditions or problems, and seek emergency medical treatment and/or call 911 if deemed necessary. Patient identification was verified at the start of the visit: Yes    Total time spent on this encounter: Not billed by time    Services were provided through a video synchronous discussion virtually to substitute for in-person clinic visit. Patient and provider were located at their individual homes. --Ran Richey MD on 8/22/2020 at 9:53 PM    An electronic signature was used to authenticate this note.

## 2020-08-14 NOTE — TELEPHONE ENCOUNTER
----- Message from [facility] [place] - INPATIENT sent at 8/14/2020 10:18 AM EDT -----  Subject: Appointment Request    Reason for Call: Immediate Return from RN Triage    QUESTIONS  Type of Appointment? Established Patient  Reason for appointment request? No appointments available during search  Additional Information for Provider? Sent to office   ---------------------------------------------------------------------------  --------------  [address]  What is the best way for the office to contact you? OK to leave message on   voicemail  Preferred Call Back Phone Number? [phone]  ---------------------------------------------------------------------------  --------------  SCRIPT ANSWERS  Patient needs to be seen today? Yes  Nurse Name? Ariana Roberts  (Did RN indicate the need for Red Scheduling?)?  Yes

## 2020-08-17 ENCOUNTER — TELEPHONE (OUTPATIENT)
Dept: PRIMARY CARE CLINIC | Age: 85
End: 2020-08-17

## 2020-08-17 NOTE — TELEPHONE ENCOUNTER
Pt wants to know what he should do after his ER visit Friday. Should he still take the medications from the ER? He would like to follow up with doctor. He went to [facility].  They found bacteria in his blood and gave pt meds for this. Covid 19 test was negative. Meds    Methylprednisolone 4 mg tab dose pack  Azithromycin 250 mg tab, 1 a day and only has 1 tab left    Albuterol 108     He can take tylenol for fever, but he hasn't had a fever, so he hasn't been taking it. Please call pt at home if questions!

## 2020-08-20 ENCOUNTER — OFFICE VISIT (OUTPATIENT)
Dept: PRIMARY CARE CLINIC | Age: 85
End: 2020-08-20
Payer: MEDICARE

## 2020-08-20 VITALS
DIASTOLIC BLOOD PRESSURE: 80 MMHG | SYSTOLIC BLOOD PRESSURE: 130 MMHG | BODY MASS INDEX: 26.72 KG/M2 | TEMPERATURE: 97.2 F | HEART RATE: 80 BPM | OXYGEN SATURATION: 95 % | WEIGHT: 197 LBS

## 2020-08-20 PROCEDURE — 99213 OFFICE O/P EST LOW 20 MIN: CPT | Performed by: INTERNAL MEDICINE

## 2020-08-20 NOTE — PATIENT INSTRUCTIONS
1. Shortness of breath  - resolved  - ER visit notes reviewed    2.  Suspected COVID-19 virus infection  -symptoms resolved  -Covid test done in ER was negative  -Patient took Zithromax and Medrol dose pack prescribed by ER

## 2020-08-20 NOTE — PROGRESS NOTES
Bashir Ford   Date ofBirth:  1935    Date of Visit:  8/20/2020    Chief Complaint   Patient presents with    Follow-Up from Hospital     SOB       HPI  Pt presents for ER follow up for shortness of breath and suspected Covid. Pt was seen in the ER on 8/14/20. Pt was given Zithromax Z Pack and Medrol dose pack. Pt states his shortness of breath is better. Pt denies cough and wheezing. Pt states his fever, chills, and body aches resolved. Pt states he didn't use the inhaler. Pt's Covid test was negative. Review of Systems   Constitutional: Negative for chills and fever. HENT: Negative for congestion, ear pain, postnasal drip, rhinorrhea, sneezing and sore throat. Respiratory: Positive for shortness of breath. Negative for cough, chest tightness and wheezing. Neurological: Negative for headaches. Allergies   Allergen Reactions    Isosorbide Nitrate Other (See Comments)     headache    Nitroglycerin Other (See Comments)     headache    Meropenem Rash     Outpatient Medications Marked as Taking for the 8/20/20 encounter (Office Visit) with Juan Alberto Velazco MD   Medication Sig Dispense Refill    sertraline (ZOLOFT) 50 MG tablet TAKE 1 TABLET BY MOUTH ONE TIME A DAY  30 tablet 5    Multiple Vitamins-Minerals (PRESERVISION AREDS PO) Take by mouth      loratadine (CLARITIN) 10 MG tablet Take 1 tablet by mouth daily (Patient taking differently: Take 10 mg by mouth daily PRN) 30 tablet 1    finasteride (PROSCAR) 5 MG tablet Take 5 mg by mouth daily      tamsulosin (FLOMAX) 0.4 MG capsule Take 0.4 mg by mouth daily      ramipril (ALTACE) 2.5 MG capsule Take 2.5 mg by mouth daily      acetaminophen (TYLENOL) 325 MG tablet Take 650 mg by mouth every 6 hours as needed for Pain      aspirin 81 MG tablet Take 81 mg by mouth daily.  metoprolol (TOPROL-XL) 25 MG XL tablet Take 25 mg by mouth 2 times daily       clopidogrel (PLAVIX) 75 MG tablet Take 75 mg by mouth daily.       simvastatin (ZOCOR) 20 MG tablet Take 20 mg by mouth nightly.  Multiple Vitamins-Minerals (THERAPEUTIC MULTIVITAMIN-MINERALS) tablet Take 1 tablet by mouth daily.  fluticasone (FLONASE) 50 MCG/ACT nasal spray 2 sprays by Nasal route daily. 1 Bottle 0         Vitals:    08/20/20 1528   BP: 130/80   Pulse: 80   Temp: 97.2 °F (36.2 °C)   SpO2: 95%   Weight: 197 lb (89.4 kg)     Body mass index is 26.72 kg/m². Physical Exam  Nursing note reviewed. Constitutional:       General: He is not in acute distress. Appearance: He is well-developed. HENT:      Mouth/Throat:      Pharynx: Oropharynx is clear. Eyes:      Extraocular Movements: Extraocular movements intact. Pupils: Pupils are equal, round, and reactive to light. Neck:      Thyroid: No thyromegaly. Vascular: No carotid bruit. Cardiovascular:      Rate and Rhythm: Normal rate and regular rhythm. Heart sounds: Normal heart sounds, S1 normal and S2 normal. No murmur. Pulmonary:      Effort: Pulmonary effort is normal.      Breath sounds: Normal breath sounds. Psychiatric:         Mood and Affect: Mood normal.           No results found for this visit on 08/20/20.   Lab Review   Orders Only on 08/14/2020   Component Date Value    WBC 08/14/2020 11.6*    RBC 08/14/2020 3.96*    Hemoglobin 08/14/2020 13.6     Hematocrit 08/14/2020 41.0     MCV 08/14/2020 103.8*    MCH 08/14/2020 34.3*    MCHC 08/14/2020 33.1     RDW 08/14/2020 13.0     Platelets 17/58/8965 277     MPV 08/14/2020 7.7     Neutrophils % 08/14/2020 84.0     Lymphocytes % 08/14/2020 3.6     Monocytes % 08/14/2020 11.8     Eosinophils % 08/14/2020 0.2     Basophils % 08/14/2020 0.4     Neutrophils Absolute 08/14/2020 9.8*    Lymphocytes Absolute 08/14/2020 0.4*    Monocytes Absolute 08/14/2020 1.4*    Eosinophils Absolute 08/14/2020 0.0     Basophils Absolute 08/14/2020 0.0     Sodium 08/14/2020 137     Potassium 08/14/2020 4.3     Chloride 08/14/2020 100  CO2 08/14/2020 23     Anion Gap 08/14/2020 14     Glucose 08/14/2020 128*    BUN 08/14/2020 20     CREATININE 08/14/2020 1.2     GFR Non- 08/14/2020 58*    GFR  08/14/2020 >60     Calcium 08/14/2020 9.1    Orders Only on 02/24/2020   Component Date Value    Visual Acuity Distance R* 02/24/2020 20/25     Visual Acuity Distance L* 02/24/2020 20/20     Intraocular Pressure Eye 02/24/2020                      Value:6  9      Diabetic Retinopathy 02/24/2020 NEGATIVE     Cataracts 02/24/2020 NEGATIVE     Glaucoma 02/24/2020 NEGATIVE    r      Assessment/Plan     1. Shortness of breath  - resolved  - ER visit notes reviewed    2. Suspected COVID-19 virus infection  -symptoms resolved  -Covid test done in ER was negative  -Patient took Zithromax and Medrol dose pack prescribed by ER      Return in about 2 weeks (around 9/3/2020) for diabetes, hypertension, depression, and hyperlipidemia.

## 2020-08-22 PROBLEM — R68.83 CHILLS: Status: ACTIVE | Noted: 2020-08-22

## 2020-08-22 PROBLEM — R06.02 SHORTNESS OF BREATH: Status: ACTIVE | Noted: 2020-08-22

## 2020-08-22 LAB
ALBUMIN SERPL-MCNC: 4 G/DL (ref 3.5–5)
ALP BLD-CCNC: 53 IU/L (ref 35–135)
ALT SERPL-CCNC: 15 IU/L (ref 10–60)
ANION GAP SERPL CALCULATED.3IONS-SCNC: 6 MMOL/L (ref 6–18)
AST SERPL-CCNC: 14 IU/L (ref 10–40)
BASOPHILS ABSOLUTE: 0.1 THOU/MCL (ref 0–0.2)
BASOPHILS ABSOLUTE: 1 %
BILIRUB SERPL-MCNC: 0.4 MG/DL (ref 0–1.2)
BUN BLDV-MCNC: 26 MG/DL (ref 8–26)
CALCIUM SERPL-MCNC: 9 MG/DL (ref 8.5–10.5)
CHLORIDE BLD-SCNC: 105 MEQ/L (ref 101–111)
CHOLESTEROL, TOTAL: 130 MG/DL
CO2: 28 MMOL/L (ref 24–36)
CREAT SERPL-MCNC: 1.12 MG/DL (ref 0.64–1.27)
EOSINOPHILS ABSOLUTE: 0.6 THOU/MCL (ref 0.03–0.45)
EOSINOPHILS RELATIVE PERCENT: 4 %
ESTIMATED AVERAGE GLUCOSE: 140 MG/DL
GFR AFRICAN AMERICAN: 68 ML/MIN/1.73 M2
GFR NON-AFRICAN AMERICAN: 59 ML/MIN/1.73 M2
GLUCOSE BLD-MCNC: 100 MG/DL (ref 70–99)
HBA1C MFR BLD: 6.5 % (ref 4.2–5.6)
HCT VFR BLD CALC: 43.4 % (ref 40–50)
HDLC SERPL-MCNC: 30 MG/DL
HEMOGLOBIN: 14.5 G/DL (ref 13.5–16.5)
LDL CHOLESTEROL CALCULATED: 55 MG/DL
LYMPHOCYTES ABSOLUTE: 3.1 THOU/MCL (ref 1–4)
LYMPHOCYTES RELATIVE PERCENT: 22 %
MCH RBC QN AUTO: 34.5 PG (ref 27–33)
MCHC RBC AUTO-ENTMCNC: 33.5 G/DL (ref 32–36)
MCV RBC AUTO: 103.2 FL (ref 82–97)
MONOCYTES # BLD: 10 %
MONOCYTES ABSOLUTE: 1.4 THOU/MCL (ref 0.2–0.9)
NEUTROPHILS ABSOLUTE: 8.8 THOU/MCL (ref 1.8–7.7)
NONHDLC SERPL-MCNC: 100 MG/DL
PDW BLD-RTO: 13.2 % (ref 12.3–17)
PLATELET # BLD: 406 THOU/MCL (ref 140–375)
PMV BLD AUTO: 7.2 FL (ref 7.4–11.5)
POTASSIUM SERPL-SCNC: 4.2 MEQ/L (ref 3.6–5.1)
RBC # BLD: 4.21 MIL/MCL (ref 4.4–5.8)
SEG NEUTROPHILS: 63 %
SODIUM BLD-SCNC: 139 MEQ/L (ref 135–145)
TOTAL PROTEIN: 6.8 G/DL (ref 6–8)
TRIGL SERPL-MCNC: 224 MG/DL
WBC # BLD: 13.9 THOU/MCL (ref 3.6–10.5)

## 2020-08-22 ASSESSMENT — ENCOUNTER SYMPTOMS: SHORTNESS OF BREATH: 1

## 2020-08-23 NOTE — PATIENT INSTRUCTIONS
1. Shortness of breath  - CBC Auto Differential; Future  - XR CHEST (2VW)  - Patient to have Covid-10 test done    2. Fatigue, unspecified type  - CBC Auto Differential; Future  - Basic Metabolic Panel; Future  -Patient to have Covid-19 test done    3.  Chills  - CBC Auto Differential; Future  -Patient to have Covid-19 test done  - Tylenol 650mg 3 times daily as needed

## 2020-08-27 ASSESSMENT — ENCOUNTER SYMPTOMS
RHINORRHEA: 0
WHEEZING: 0
SHORTNESS OF BREATH: 1
CHEST TIGHTNESS: 0
COUGH: 0
SORE THROAT: 0

## 2020-09-01 NOTE — TELEPHONE ENCOUNTER
This is an old message that has been addressed. Pt instructed to take ER medications and follow up. Pt seen in the office for follow up on 8/20/20.

## 2020-09-02 ENCOUNTER — OFFICE VISIT (OUTPATIENT)
Dept: ORTHOPEDIC SURGERY | Age: 85
End: 2020-09-02
Payer: MEDICARE

## 2020-09-02 VITALS — WEIGHT: 197 LBS | TEMPERATURE: 97.1 F | RESPIRATION RATE: 12 BRPM | HEIGHT: 72 IN | BODY MASS INDEX: 26.68 KG/M2

## 2020-09-02 PROCEDURE — 99204 OFFICE O/P NEW MOD 45 MIN: CPT | Performed by: PHYSICIAN ASSISTANT

## 2020-09-02 RX ORDER — METHYLPREDNISOLONE 4 MG/1
TABLET ORAL
Qty: 1 KIT | Refills: 0 | Status: SHIPPED | OUTPATIENT
Start: 2020-09-02 | End: 2020-09-14 | Stop reason: CLARIF

## 2020-09-02 NOTE — PROGRESS NOTES
Pain: Constant  Aggravating Factors: Walking, Other (Comment)(Activity)  Limiting Behavior: Yes  Relieving Factors: Rest, Heat  Result of Injury: No  Work-Related Injury: No  Are there other pain locations you wish to document?: No      Associated signs and symptoms:   Neurogenic bowel or bladder symptoms:  no   Perceived weakness:  no   Difficulty walking:  yes    Recent Imaging (within past one year)   Xrays: no   MRI or CT of spine: no    Current/Past Treatment:   · Physical Therapy:  none  · Chiropractic:  none  · Injection:  none  · Medications:   NSAIDS:  none, Tylenol   Muscle relaxer:  yes   Steriods:  none   Neuropathic medications:  none   Opioids:  none  · Previous surgery:  no  · Previous surgical consult:  no  · Other:  · Infection control  · Tested positive for MRSA in past 12 months:  no  · Tested positive for MSSA \"staph infection\" in past 12 months: no  · Tested positive for VRE (Vancomycin Resistant Enterococci) in past 12 months:   no  · Currently on any antibiotics for an infection: no  · Anticoagulants:  · On a blood thinner:  yes , ASA and Plavix  · Any history of bleeding disorder: no   · MRI Contraindication: yes, pacemaker    · Previous Pain Management: no       Past medical, surgical, social and family history reviewed with the patient.      Past Medical History:   Past Medical History:   Diagnosis Date    Aortic valve disease     Arthritis     BPH (benign prostatic hyperplasia)     Coronary artery vasospasm ([address]     Diverticulosis     H/O squamous cell carcinoma of skin     Hematuria 7/26/2017    HTN (hypertension)     Hyperlipidemia     Mixed hyperlipidemia 1/7/2016    Osteoarthritis of right knee     PSA elevation       Past Surgical History:     Past Surgical History:   Procedure Laterality Date    APPENDECTOMY  1970    CARDIAC PACEMAKER PLACEMENT  12/2016    Yudy Carmona MD    COSMETIC SURGERY  2010    skin cancer - base of nose    HERNIA REPAIR  2011    JOINT REPLACEMENT 7/21/2015    Eran Young MD   [address]    exploratoryt for perf diverticulum    PROSTATE SURGERY  2011    biopsy - [facility]    TONSILLECTOMY AND ADENOIDECTOMY  as a child     Current Medications:     Current Outpatient Medications:     sertraline (ZOLOFT) 50 MG tablet, TAKE 1 TABLET BY MOUTH ONE TIME A DAY , Disp: 30 tablet, Rfl: 5    Multiple Vitamins-Minerals (PRESERVISION AREDS PO), Take by mouth, Disp: , Rfl:     loratadine (CLARITIN) 10 MG tablet, Take 1 tablet by mouth daily (Patient taking differently: Take 10 mg by mouth daily PRN), Disp: 30 tablet, Rfl: 1    finasteride (PROSCAR) 5 MG tablet, Take 5 mg by mouth daily, Disp: , Rfl:     tamsulosin (FLOMAX) 0.4 MG capsule, Take 0.4 mg by mouth daily, Disp: , Rfl:     ramipril (ALTACE) 2.5 MG capsule, Take 2.5 mg by mouth daily, Disp: , Rfl:     acetaminophen (TYLENOL) 325 MG tablet, Take 650 mg by mouth every 6 hours as needed for Pain, Disp: , Rfl:     aspirin 81 MG tablet, Take 81 mg by mouth daily. , Disp: , Rfl:     metoprolol (TOPROL-XL) 25 MG XL tablet, Take 25 mg by mouth 2 times daily , Disp: , Rfl:     clopidogrel (PLAVIX) 75 MG tablet, Take 75 mg by mouth daily. , Disp: , Rfl:     simvastatin (ZOCOR) 20 MG tablet, Take 20 mg by mouth nightly., Disp: , Rfl:     Multiple Vitamins-Minerals (THERAPEUTIC MULTIVITAMIN-MINERALS) tablet, Take 1 tablet by mouth daily. , Disp: , Rfl:     fluticasone (FLONASE) 50 MCG/ACT nasal spray, 2 sprays by Nasal route daily. , Disp: 1 Bottle, Rfl: 0  Allergies:  Isosorbide nitrate; Nitroglycerin; and Meropenem  Social History:    reports that he quit smoking about 5 years ago. He has never used smokeless tobacco. He reports current alcohol use. He reports that he does not use drugs.   Family History:   Family History   Problem Relation Age of Onset    Heart Disease Mother     Pacemaker Mother     Cancer Neg Hx     Diabetes Neg Hx     Stroke Neg Hx          REVIEW OF SYSTEMS: ROS - 14 point    Constitutional: No fevers, chills, night sweats, unexplained weight loss  Eye: No vision changes or diplopia  ENT: No nasal congestion, postnasal drip or sore throat. No tinnitus  Respiratory: No cough or SOB  CV: No chest pain or palpitations  GI: No nausea, abdominal pain, stool changes  : No dysuria or hematuria  Skin: No new or changing skin lesions, no rashes  MSK: No joint swelling, morning stiffness, unusual joint pain, + low back and right leg pain  Neurological: No headache, confusion, syncope  Psychiatric: No excessive anxiety or depression  Endocrine: No polyuria or polydipsia  Hematologic: No lymph node enlargement or excessive bleeding  Immunologic:No history of immune deficiency or immunomodulating drugs           PHYSICAL EXAM:    Vitals: Temperature 97.1 °F (36.2 °C), resp. rate 12, height 6' (1.829 m), weight 197 lb (89.4 kg). GENERAL EXAM:  · General Apparence: Patient is adequately groomed with no evidence of malnutrition. · Psychiatric: Orientation: The patient is oriented to time, place and person. The patient's mood and affect are appropriate   · Vascular: Examination reveals no swelling and palpation reveals no tenderness in upper or lower extremities. Good capillary refill. · The lymphatic examination of the neck, axillae and groin reveals all areas to be without enlargement or induration   Sensation is intact without deficit in the upper and lower extremities to light touch and pinprick  · Coordination of the upper and lower extremities are normal.    CERVICAL EXAMINATION:  · Inspection: Local inspection shows no step-off or bruising. Cervical alignment is normal. No instability is noted. · Palpation and Percussion: No evidence of tenderness at the midline. Paraspinal tenderness is not present. There is no paraspinal spasm.   · Range of Motion:  pain-free ROM   · Strength: 5/5 bilateral upper extremities  · Special Tests:   Spurling's and Crystal's are negative ataxia  · Additional Examinations:  · RIGHT LOWER EXTREMITY: Inspection/examination of the right lower extremity does not show any tenderness, deformity or injury. Range of motion is unremarkable. There is no gross instability. There are no rashes, ulcerations or lesions. Strength and tone are normal. No atrophy or abnormal movements are noted. · LEFT LOWER EXTREMITY:  Inspection/examination of the left lower extremity does not show any tenderness, deformity or injury. Range of motion is unremarkable. There is no gross instability. There are no rashes, ulcerations or lesions. Strength and tone are normal. No atrophy or abnormal movements are noted. Diagnostic Testing:    Xrays:   AP and lateral of the lumbar spine taken today in the office show 5 nonrib-bearing lumbar vertebral bodies with moderate dextroscoliosis noted, L1-2, L4-5, and L5-S1 severe degenerative disc disease with all other levels are moderate to severe seen in the lumbar spine. MRI or CT:  MRI of the Lumbar Spine from 11/8/17:   FINDINGS:  It is assumed for the purpose of this dictation that there are five lumbar type    non-rib-bearing vertebral bodies.  The lowest lumbar type vertebral body will be designated as    L5.         Prominent changes are again demonstrated at the L4-L5 level with prominent subchondral signal    change and enhancement.  Associated endplate irregularity also seen.  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  There continues to be associated    disc bulging.  Prominent regions of epidural enhancement are again noted at the L4 and L5    levels.  This appears similar to the prior.  Enhancement also continues posteriorly to involve    the dura extending from the L3 L4-L5 S1 levels.  Again this is    grossly similar.  These changes along with superimposed degenerative findings continue to cause     severe central canal stenosis at the L4-L5 level.         Small focus of enhancement noted at the T12-L1 level similar to the prior.  This could be    related to degenerative dural enhancement although component of infection difficult to    completely exclude.         No other pathologic parenchymal or meningeal enhancement seen.         The remainder of the lumbar vertebra demonstrate normal height and marrow signal.  The conus    medullaris terminates at the L1 vertebral level.  The conus itself is free of abnormal signal.       The cauda equina is grossly normal.         T11-T12: Minimal broad-based disc bulging causes no significant central canal or foraminal    compromise.         T12-L1: Broad-based disc bulging causes mild central canal stenosis.  Mild right and moderate    left foraminal narrowing.  Assessment limited given the lack of axial imaging.         L1-2: Minimal anterolisthesis 1 mm.  Broad-based disc bulging causes mild central canal and    mild bilateral foraminal narrowing slightly more pronounced on the left.  Assessment limited    given the lack of axial imaging.         L2-3: Broad-based disc bulging and moderate facet arthropathy with thickening of the ligament    of flavum cause mild to moderate central canal stenosis.  No significant right foraminal    narrowing.  Mild left foraminal compromise.         L3-4: Broad-based disc bulging and moderate facet arthropathy with ligamentum flavum    hypertrophy cause moderate central canal and mild bilateral foraminal narrowing.         L4-5: As indicated previously a combination of disc bulging, neural changes, and severe facet    arthropathy causes severe central canal stenosis at this level.  There is severe left and right     foraminal compromise with compression of the exiting L4 nerve    roots.  Correlation L4 radiculopathy.         L5-S1: Broad-based disc bulging with superimposed right foraminal texture foraminal broad-based     disc protrusion.  Moderate facet arthropathy.  Mild central canal stenosis.  Mild left and    severe right foraminal compromise.  Correlation right L5    radiculopathy.         Paravertebral soft tissues are unremarkable.         COMMENT:    Please note that this record was generated using voice recognition software. If there are any    questions about the content of this document, please contact the radiologist at [phone] as     some errors in transcription may have occurred.         IMPRESSION:         Changes at the L4-L5 level similar to the prior most consistent with sequela of discitis    osteomyelitis.  Associated dural enhancement is also not significantly changed likely related    to epidural phlegmon.  These changes along with superimposed    degenerative findings do result in severe central canal stenosis at this level.         Small region of enhancement T12-L1 level unchanged since the prior.  Differential    considerations include degenerative dural enhancement versus less likely infection.         Degenerative changes as described. Channie Cluster is prominent foraminal narrowing at the L4-L5 and    L5-S1 levels.  Compression of the exiting nerve roots also seen and correlation for L4 and L5    radiculopathy recommended.       EMG:  None  Results for orders placed or performed in visit on 08/20/20   CBC Auto Differential   Result Value Ref Range    WBC 13.9 (H) 3.6 - 10.5 THOU/mcL    RBC 4.21 (L) 4.40 - 5.80 MIL/mcL    Hemoglobin 14.5 13.5 - 16.5 g/dL    Hematocrit 43.4 40 - 50 %    .2 (H) 82 - 97 fL    MCH 34.5 (H) 27 - 33 pg    MCHC 33.5 32 - 36 g/dL    RDW 13.2 12.3 - 17.0 %    Platelets 870 (H) 211 - 375 THOU/mcL    MPV 7.2 (L) 7.4 - 11.5 fL    Neutrophils Absolute 8.80 (H) 1.80 - 7.70 THOU/mcL    Lymphocytes Absolute 3.10 1.00 - 4.00 THOU/mcL    Monocytes Absolute 1.40 (H) 0.20 - 0.90 THOU/mcL    Eosinophils Absolute 0.60 (H) 0.03 - 0.45 THOU/mcL    Basophils Absolute 0.10 0.00 - 0.20 THOU/mcL    Seg Neutrophils 63 %    Lymphocytes % 22 %    Monocytes 10 %    Eosinophils % 4 %    Basophils Absolute 1 %

## 2020-09-09 ENCOUNTER — TELEPHONE (OUTPATIENT)
Dept: ORTHOPEDIC SURGERY | Age: 85
End: 2020-09-09

## 2020-09-09 NOTE — TELEPHONE ENCOUNTER
S/W PATIENT to let him know CT LSP is still pending approval with his insurance. Patient voiced understanding of the conversation and will contact the office with further questions or concerns.

## 2020-09-10 ENCOUNTER — TELEPHONE (OUTPATIENT)
Dept: ORTHOPEDIC SURGERY | Age: 85
End: 2020-09-10

## 2020-09-10 NOTE — TELEPHONE ENCOUNTER
CT LUMBAR SPINE has been DENIED due to lack of 6 weeks conservative treatment.  Call [phone] opt#4 ref# [id number] - JLB

## 2020-09-10 NOTE — TELEPHONE ENCOUNTER
Medication:   Requested Prescriptions     Pending Prescriptions Disp Refills    sertraline (ZOLOFT) 50 MG tablet [Pharmacy Med Name: Sertraline HCl Oral Tablet 50 MG] 30 tablet 0     Sig: TAKE 1 TABLET BY MOUTH ONE TIME A DAY     Last Filled: 3.10.20    Last appt: 8/20/2020   Next appt: 9/14/2020    Last OARRS: No flowsheet data found.

## 2020-09-14 ENCOUNTER — OFFICE VISIT (OUTPATIENT)
Dept: PRIMARY CARE CLINIC | Age: 85
End: 2020-09-14
Payer: MEDICARE

## 2020-09-14 VITALS
DIASTOLIC BLOOD PRESSURE: 70 MMHG | SYSTOLIC BLOOD PRESSURE: 110 MMHG | TEMPERATURE: 98.3 F | HEART RATE: 68 BPM | OXYGEN SATURATION: 95 % | RESPIRATION RATE: 16 BRPM | WEIGHT: 196.4 LBS | HEIGHT: 72 IN | BODY MASS INDEX: 26.6 KG/M2

## 2020-09-14 DIAGNOSIS — D72.829 LEUKOCYTOSIS, UNSPECIFIED TYPE: ICD-10-CM

## 2020-09-14 LAB
BASOPHILS ABSOLUTE: 0.1 K/UL (ref 0–0.2)
BASOPHILS RELATIVE PERCENT: 0.8 %
EOSINOPHILS ABSOLUTE: 0.5 K/UL (ref 0–0.6)
EOSINOPHILS RELATIVE PERCENT: 5.8 %
HCT VFR BLD CALC: 39.9 % (ref 40.5–52.5)
HEMOGLOBIN: 13.3 G/DL (ref 13.5–17.5)
LYMPHOCYTES ABSOLUTE: 2 K/UL (ref 1–5.1)
LYMPHOCYTES RELATIVE PERCENT: 22 %
MCH RBC QN AUTO: 34.9 PG (ref 26–34)
MCHC RBC AUTO-ENTMCNC: 33.4 G/DL (ref 31–36)
MCV RBC AUTO: 104.6 FL (ref 80–100)
MONOCYTES ABSOLUTE: 1.4 K/UL (ref 0–1.3)
MONOCYTES RELATIVE PERCENT: 15.2 %
NEUTROPHILS ABSOLUTE: 5.1 K/UL (ref 1.7–7.7)
NEUTROPHILS RELATIVE PERCENT: 56.2 %
PDW BLD-RTO: 13.9 % (ref 12.4–15.4)
PLATELET # BLD: 264 K/UL (ref 135–450)
PMV BLD AUTO: 7.5 FL (ref 5–10.5)
RBC # BLD: 3.81 M/UL (ref 4.2–5.9)
WBC # BLD: 9.1 K/UL (ref 4–11)

## 2020-09-14 PROCEDURE — 99214 OFFICE O/P EST MOD 30 MIN: CPT | Performed by: INTERNAL MEDICINE

## 2020-09-14 ASSESSMENT — PATIENT HEALTH QUESTIONNAIRE - PHQ9
DEPRESSION UNABLE TO ASSESS: PT REFUSES
1. LITTLE INTEREST OR PLEASURE IN DOING THINGS: 0
SUM OF ALL RESPONSES TO PHQ QUESTIONS 1-9: 0
SUM OF ALL RESPONSES TO PHQ9 QUESTIONS 1 & 2: 0
2. FEELING DOWN, DEPRESSED OR HOPELESS: 0
SUM OF ALL RESPONSES TO PHQ QUESTIONS 1-9: 0

## 2020-09-14 ASSESSMENT — ENCOUNTER SYMPTOMS: BACK PAIN: 1

## 2020-09-14 NOTE — PROGRESS NOTES
Ana Paula Iglesias   Date ofBirth:  1935    Date of Visit:  9/14/2020    Chief Complaint   Patient presents with    Diabetes    Hypertension    Depression    Hyperlipidemia       HPI  Diabetes Mellitus Type 2:   Current Medications: None, patient is diet controlled  Diet : Patient decreases carbohydrates  Home blood sugar records: patient tests 1 time(s) per day , fasting averages 100  Any episodes of hypoglycemia? no  Eye exam current (within one year): yes per patient  Daily Aspirin? Yes 81mg once daily and Plavix 75mg once daily  Current exercise: no regular exercise but patient is active      Hypertension:    Current Medications: Ramipril 2.5mg po q day and Metoprolol ER 25mg po bid  Home blood pressure monitoring: No.    Diet: low sodium     Hyperlipidemia:   Current medication: Simvastatin 20mg po qhs  Diet: Patient decreases fat and cholesterol     Depression:  Current medication: Sertraline 50mg po q day  Patient states his depression is fine. Patient denies feeling sad or down    Pt states he is seeing Orthopedics for back pain. Review of Systems   Constitutional: Negative for activity change, appetite change, chills, fatigue, fever and unexpected weight change. HENT: Negative for congestion, postnasal drip, rhinorrhea and sore throat. Eyes: Negative for visual disturbance. Respiratory: Negative for cough, chest tightness, shortness of breath and wheezing. Cardiovascular: Negative for chest pain, palpitations and leg swelling. Gastrointestinal: Negative for abdominal pain, constipation, diarrhea, nausea and vomiting. Genitourinary: Negative for dysuria, frequency and hematuria. Musculoskeletal: Positive for back pain. Negative for arthralgias and myalgias. Skin: Negative for wound. Neurological: Negative for dizziness, syncope, light-headedness, numbness and headaches. Psychiatric/Behavioral: Positive for dysphoric mood.  Negative for decreased concentration and sleep intact. Conjunctiva/sclera: Conjunctivae normal.      Pupils: Pupils are equal, round, and reactive to light. Neck:      Musculoskeletal: Neck supple. Thyroid: No thyromegaly. Vascular: No carotid bruit. Cardiovascular:      Rate and Rhythm: Normal rate and regular rhythm. Heart sounds: S1 normal and S2 normal. Murmur present. Systolic murmur present with a grade of 2/6. No friction rub. No gallop. Pulmonary:      Effort: Pulmonary effort is normal. No respiratory distress. Breath sounds: Normal breath sounds. No wheezing, rhonchi or rales. Abdominal:      General: Bowel sounds are normal. There is no distension. Palpations: Abdomen is soft. Tenderness: There is no abdominal tenderness. Musculoskeletal:      Right lower leg: No edema. Left lower leg: No edema. Lymphadenopathy:      Head:      Right side of head: No submandibular adenopathy. Left side of head: No submandibular adenopathy. Psychiatric:         Mood and Affect: Mood normal.           No results found for this visit on 09/14/20.   Lab Review   Orders Only on 08/20/2020   Component Date Value    WBC 08/22/2020 13.9*    RBC 08/22/2020 4.21*    Hemoglobin 08/22/2020 14.5     Hematocrit 08/22/2020 43.4     MCV 08/22/2020 103.2*    MCH 08/22/2020 34.5*    MCHC 08/22/2020 33.5     RDW 08/22/2020 13.2     Platelets 64/92/8226 406*    MPV 08/22/2020 7.2*    Neutrophils Absolute 08/22/2020 8.80*    Lymphocytes Absolute 08/22/2020 3.10     Monocytes Absolute 08/22/2020 1.40*    Eosinophils Absolute 08/22/2020 0.60*    Basophils Absolute 08/22/2020 0.10     Seg Neutrophils 08/22/2020 63     Lymphocytes % 08/22/2020 22     Monocytes 08/22/2020 10     Eosinophils % 08/22/2020 4     Basophils Absolute 08/22/2020 1     Sodium 08/22/2020 139     Potassium 08/22/2020 4.2     Chloride 08/22/2020 105     CO2 08/22/2020 28     Glucose 08/22/2020 100*    BUN 08/22/2020 26     CREATININE 08/22/2020 1.12     Calcium 08/22/2020 9.0     Total Protein 08/22/2020 6.8     Alb 08/22/2020 4.0     Total Bilirubin 08/22/2020 0.4     Alkaline Phosphatase 08/22/2020 53     AST 08/22/2020 14     ALT 08/22/2020 15     GFR Non- 08/22/2020 59*    GFR  08/22/2020 68     Anion Gap 08/22/2020 6     Cholesterol, Total 08/22/2020 130     Triglycerides 08/22/2020 224*    HDL 08/22/2020 30*    LDL Calculated 08/22/2020 55     Cholesterol non HDL 08/22/2020 100     Hemoglobin A1C 08/22/2020 6.5*    eAG 08/22/2020 140    Orders Only on 08/14/2020   Component Date Value    WBC 08/14/2020 11.6*    RBC 08/14/2020 3.96*    Hemoglobin 08/14/2020 13.6     Hematocrit 08/14/2020 41.0     MCV 08/14/2020 103.8*    MCH 08/14/2020 34.3*    MCHC 08/14/2020 33.1     RDW 08/14/2020 13.0     Platelets 90/95/3257 277     MPV 08/14/2020 7.7     Neutrophils % 08/14/2020 84.0     Lymphocytes % 08/14/2020 3.6     Monocytes % 08/14/2020 11.8     Eosinophils % 08/14/2020 0.2     Basophils % 08/14/2020 0.4     Neutrophils Absolute 08/14/2020 9.8*    Lymphocytes Absolute 08/14/2020 0.4*    Monocytes Absolute 08/14/2020 1.4*    Eosinophils Absolute 08/14/2020 0.0     Basophils Absolute 08/14/2020 0.0     Sodium 08/14/2020 137     Potassium 08/14/2020 4.3     Chloride 08/14/2020 100     CO2 08/14/2020 23     Anion Gap 08/14/2020 14     Glucose 08/14/2020 128*    BUN 08/14/2020 20     CREATININE 08/14/2020 1.2     GFR Non- 08/14/2020 58*    GFR  08/14/2020 >60     Calcium 08/14/2020 9.1          Assessment/Plan     1. Type 2 diabetes mellitus without complication, without long-term current use of insulin (HCC)  -Hemoglobin A1c of 6.5% shows diabetes is controlled  -Continue diet control  -Limit carbohydrates to 45 grams with meals and 15 grams with snacks  -monitor blood sugars  -Regular aerobic exercise    2.  Essential hypertension  -stable  -Continue same medications  -Low sodium diet  -Regular aerobic exercise    3. Mixed hyperlipidemia  -Continue same medications  -Low fat, low cholesterol diet  -Regular aerobic exercise    4. Major depression, single episode, in complete remission ([facility]  -stable  -Continue same medications    5. Leukocytosis, unspecified type  - CBC Auto Differential; Future      Discussed medications with patient, who voiced understanding of their use and indications. All questions answered. Return in about 3 months (around 12/16/2020) for Medicare AWV.

## 2020-09-20 ASSESSMENT — ENCOUNTER SYMPTOMS
RHINORRHEA: 0
SORE THROAT: 0
NAUSEA: 0
CHEST TIGHTNESS: 0
ABDOMINAL PAIN: 0
SHORTNESS OF BREATH: 0
WHEEZING: 0
VOMITING: 0
COUGH: 0
CONSTIPATION: 0
DIARRHEA: 0

## 2020-09-21 ENCOUNTER — HOSPITAL ENCOUNTER (OUTPATIENT)
Dept: PHYSICAL THERAPY | Age: 85
Setting detail: THERAPIES SERIES
Discharge: HOME | End: 2020-09-21
Payer: MEDICARE

## 2020-09-21 PROCEDURE — 97161 PT EVAL LOW COMPLEX 20 MIN: CPT | Performed by: PHYSICAL THERAPIST

## 2020-09-21 PROCEDURE — 97112 NEUROMUSCULAR REEDUCATION: CPT | Performed by: PHYSICAL THERAPIST

## 2020-09-21 NOTE — PLAN OF CARE
The [facility] and [facility]  [address], [place], [address] [facility]  Phone: [phone]   Fax:     [phone]                                                       Mely Nelson    Dear Dr. Shyla Witt  ,,    We had the pleasure of evaluating the following patient for physical therapy services at [address] [place] [address]. A summary of our findings can be found in the initial assessment below. This includes our plan of care. If you have any questions or concerns regarding these findings, please do not hesitate to contact me at the office phone number checked above. Thank you for the referral.       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   [] Yes, Patient intake triggered further evaluation      [] C-SSRS Screening completed  [] PCP notified via Plan of Care  [] Emergency services notified      SUBJECTIVE: Pt chief complaint is low back pain with pain that is traveling down the right leg and some into the left leg. This has been going on for the past month.  Pain travels into the right leg down to the ankle. Has had sciatica issues in the past. Received a steroid patch that did provide some relief. Relevant Medical History: Hx of sciatica, jae knee replacement, HTN, DM  Functional Disability Index: Oswestry: 36%    Pain Scale: 4-10/10 low back; down the leg 3/10  Easing factors: sitting      Provocative factors:   Standing, walking    Night Pain:  - denied pain at night: does take two tylenol prior to sleep      Type:      - Intermittent     Paresthesia complaints:      - Paresthesia complaints in a distribution. 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 PACEMAKER:    - Has a pacemaker that would make electrical modalities contraindicated. METAL IMPLANTS:  - Denied metal implants that would contraindicate the use of thermal modalities. CANCER HISTORY:  - Denied a history of cancer that would contraindicate thermal modalities. OBJECTIVE:   ROM: Lumbar flexion- 75%, extension- 25% painful, L side bend- 1\" from knee joint, R side bend- 2\" from knee joint, thoracic rotation- 50%      Strength/Neuromuscular control: Jae LE-: 4- /5    Dermatomal Sensation:  - Dermatomal sensation was intact to light touch bilaterally throughout upper and lower extremities. Deep tendon reflexes:  - DTR's were symmetrical and intact throughout. Joint mobility: hypomobility lumbar    Flexibility: Jae HS flexibility = -25 deg    Palpation: mild TTP R paraspinal    Functional Mobility/Transfers: Indep with all transfers; slow with bed mobility    Posture:  Mild Increased kyphotic thoracic posture.  Inc trunk flex in standing. Mod lumbar thoracic junction scoliosis    Bandages/Dressings/Incisions: n/a    Gait: (include devices/WB status)  Dec step length with mild L trendelenburg    Special Tests/Functional tests: Neg Straight leg test      Orthopedic Special Tests:                        [x] Patient history, allergies, meds reviewed. Medical chart reviewed. See intake form. Review Of Systems (ROS):  [x]Performed Review of systems (Integumentary, CardioPulmonary, Neurological) by intake and observation. Intake form has been scanned into medical record. Patient has been instructed to contact their primary care physician regarding ROS issues if not already being addressed at this time. Cognitive, Communication, Behavior and Learning:  - The patient was alert, spoke coherently and was oriented to person, place and time. - Demonstrated no barriers to communication or processing information.  - Appeared literate, spoke [place] and had no significant hearing or vision impairment. - Had no abnormal behavioral responses, learning preferences or educational needs. Cardiopulmonary:  Edema:     Integumentary:  Nail beds:  Skin appearance:    Co-morbidities/Complexities (which may affect course of rehabilitation):   []Morbid obesity (E66.01)   []Uncontrolled HTN (I10) [x]DM type 1(E10.65) or 2 (E11.65) []RA(M05.9)/OA(M19.91)  []None  [x]other: pacemaker    MEDICARE CAP EXCEPTION DOCUMENTATION  I certify that this patient meets one of the below criteria necessary for becoming an exception to the Medicare cap on therapy services:  []  The patient has a condition that has a direct and significant impact on the need for therapy. (Significantly impacts the rate of recovery)  []  The patient has a complexity that has a direct and significant impact on the need for therapy.   (Significantly impacts the rate of recovery and is associated with a primary condition.)       []  The patient has associated variables that influence the amount of treatment to include:  Social support, self-efficacy/motivation, prognosis, time since onset/acuity. []  The patient has generalized musculoskeletal conditions or a condition affecting multiple sites that will have a direct impact on the rate of recovery. []  The patient had a prior episode of outpatient therapy during this calendar year for a different condition. []  The patient has a mental or cognitive disorder in addition to the condition being treated that will have a direct and significant impact on the rate of recovery. Falls Risk Assessment (30 days):   [] Falls Risk assessed and no intervention required.   [] Falls Risk assessed and Patient requires intervention due to being higher risk   TUG score (>12s at risk):     [] Falls education provided, including       ASSESSMENT:    Functional Impairments:     [x]Noted lumbar/proximal hip hypomobility   []Noted lumbosacral and/or generalized hypermobility   [x]Decreased Lumbosacral/hip/LE functional ROM   [x]Decreased core/proximal hip strength and neuromuscular control    [x]Decreased LE functional strength    []Abnormal reflexes/sensation/myotomal/dermatomal deficits  []Reduced balance/proprioceptive control    []other:      Functional Activity Limitations (from functional questionnaire and intake)   [x]Reduced ability to tolerate prolonged functional positions   [x]Reduced ability or difficulty with changes of positions or transfers between positions   [x]Reduced ability to maintain good posture and demonstrate good body mechanics with sitting, bending, and lifting   []Reduced ability to sleep   [] Reduced ability or tolerance with driving and/or computer work   []Reduced ability to perform lifting, reaching, carrying tasks   []Reduced ability to squat   []Reduced ability to forward bend   [x]Reduced ability to ambulate prolonged functional periods/distances/surfaces   []Reduced ability to ascend/descend stairs    Participation Restrictions   []Reduced participation in self care activities   []Reduced participation in home management activities   [x]Reduced participation in work activities   []Reduced participation in social activities. [x]Reduced participation in sport activities. The patient demonstrated at least 35% but less than 37% impairment, limitation or restriction in:   - walking and moving around (mobility)   - changing and maintaining body position  . Classification :  -Impaired joint mobility, motor function, muscle performance and range of motion associated with a spinal disorder.  - Signs/symptoms consistent with Lumbar direction specific/centralization subgroup      Prognosis/Rehab Potential:       - Good       Factors affecting rehab potential:  - age  - associated co-morbidities  - identified environmental, home, learning and work barriers. Tolerance of evaluation/treatment:     - Good       Physical Therapy Evaluation Complexity Justification  [] A history of present problem with:  [] no personal factors and/or comorbidities that impact the plan of care;  [x]1-2 personal factors and/or comorbidities that impact the plan of care  []3 personal factors and/or comorbidities that impact the plan of care  [] An examination of body systems using standardized tests and measures addressing any of the following: body structures and functions (impairments), activity limitations, and/or participation restrictions;:  [x] a total of 1-2 or more elements   [] a total of 3 or more elements   [] a total of 4 or more elements   [] A clinical presentation with:  [] stable and/or uncomplicated characteristics   [x] evolving clinical presentation with changing characteristics  [] unstable and unpredictable characteristics;   [] Clinical decision making of [x] low, [] moderate, [] high complexity using standardized patient assessment instrument and/or measurable assessment of functional outcome.     [x] EVAL (LOW) [id number] (typically Supine Lower Trunk Rotation - 5 reps - 10 second hold - 1x daily - 7x weekly       GOALS:  Patient stated goal:      Therapist goals for Patient:   Short Term Goals: To be achieved in: 2 weeks  - Independent in HEP and progression per patient tolerance, in order to prevent re-injury. - Patient will have a decrease in pain to facilitate improvement in movement, function, and ADLs as indicated by Functional Deficits. Long Term Goals: To be achieved in: 8 weeks  - The patient is expected to demonstrate less than 18% impairment, limitation or restriction in:  - walking and moving around (mobility)    - Patient will demonstrate an increase in LE and core Strength to 4/5 to allow for proper functional mobility as indicated by patients Functional Deficits. - Patient to demonstrate an increase in proximal LE and lumbar ROM to at least 75%  In order to perform more functional mobility activities. 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 Therapist was present, directed the patient's care, made skilled judgement, and was responsible for assessment and treatment of the patient.         Electronically signed by: Santo Rodríguez, STEFFANY, Piper Jacome, PT

## 2020-09-21 NOTE — FLOWSHEET NOTE
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, lumbar region      ·   ·    Insurance/Certification information:     Physician Information:     Has the plan of care been signed (Y/N):        []  Yes  [x]  No     Date of Patient follow up with Physician:       Is this a Progress Report:     []  Yes  [x]  No        If Yes:  Date Range for reporting period:  Beginning  Ending    Progress report will be due (10 Rx or 30 days whichever is less):       Recertification will be due (POC Duration  / 90 days whichever is less):         Visit # Insurance Allowable Auth Required   1 unlimited []  Yes []  No        Functional Scale: Oswestry- 36%   Date assessed:  20     Latex Allergy:  [x]NO      []YES  Preferred Language for Healthcare:   [x]English       []other:      Pain level:  10     SUBJECTIVE:  See eval    OBJECTIVE: See eval   Observation:    Test measurements:      RESTRICTIONS/PRECAUTIONS: pacemaker    Exercises/Interventions:       Therapeutic Ex (84854) Sets/sec Reps Notes/CUES   Hook lying rotation  6x 10\" ea                                  Education  5'                                  Manual Intervention ([id number])                                          NMR re-education (37717)   CUES NEEDED   PPT  20x 5\"    PPT marches  20x    PPT bent knee fallouts  20x                                        Therapeutic Activity (40319)                                             Exercises   · Supine Posterior Pelvic Tilt - 20 reps - 5 seconds hold - 1x daily - 7x weekly   · Supine March with Posterior Pelvic Tilt - 10 reps - 2 sets - 1x daily - 7x weekly   · Bent Knee Fallouts - 10 reps - 2 sets - 1x daily - 7x weekly   · Supine Lower Trunk Rotation - 5 reps - 10 second hold - 1x daily - 7x weekly           Therapeutic Exercise and NMR EXR  [x] (00075) Provided verbal/tactile cueing for activities related to strengthening, flexibility, endurance, ROM for improvements in LE, proximal hip, and core control with self care, mobility, lifting, ambulation. [x] (28788) Provided verbal/tactile cueing for activities related to improving balance, coordination, kinesthetic sense, posture, motor skill, proprioception to assist with LE, proximal hip, and core control in self-care, mobility, lifting, ambulation and eccentric single leg control.      NMR and Therapeutic Activities:    [x] (25416 or 11846) Provided verbal/tactile cueing for activities related to improving balance, coordination, kinesthetic sense, posture, motor skill, proprioception and motor activation to allow for proper function of core, proximal hip and LE with self-care and ADLs and functional mobility.   [] (23166) Gait Re-education- Provided training and instruction to the patient for proper LE, core and proximal hip recruitment and positioning and eccentric body weight control with ambulation re-education including up and down stairs     Home Exercise Program:    [x] (33091) Reviewed/Progressed HEP activities related to strengthening, flexibility, endurance, ROM of core, proximal hip and LE for functional self-care, mobility, lifting and ambulation/stair navigation   [] (85839) Reviewed/Progressed HEP activities related to improving balance, coordination, kinesthetic sense, posture, motor skill, proprioception of core, proximal hip and LE for self-care, mobility, lifting, and ambulation/stair navigation      Manual Treatments:  PROM / STM / Oscillations-Mobs:  G-I, II, III, IV (PA's, Inf., Post.)  [] (47271) Provided manual therapy to mobilize LE, proximal hip and/or LS spine soft tissue/joints for the purpose of modulating pain, promoting relaxation, increasing ROM, reducing/eliminating soft tissue swelling/inflammation/restriction, improving soft tissue extensibility and allowing for proper ROM for normal function with self-care, mobility, lifting and ambulation. Modalities:     [] GAME READY (VASO)- for significant edema, swelling, pain control. Charges:  Timed Code Treatment Minutes: 20'   Total Treatment Minutes: 39'      [x] EVAL (LOW) 42797 (typically 20 minutes face-to-face)  [] EVAL (MOD) 53429 (typically 30 minutes face-to-face)  [] EVAL (HIGH) 14325 (typically 45 minutes face-to-face)  [] RE-EVAL     [] HY(91901) x     [] IONTO  [x] NMR (95508) x 1    [] VASO  [] Manual (54480) x     [] Other:  [] TA x      [] Mech Traction (34302)  [] ES(attended) (23939)      [] ES (un) (70882):         ASSESSMENT:  See eval      GOALS  Patient stated goal:    [] Progressing: [] Met: [] Not Met: [] Adjusted    Therapist goals for Patient:   Short Term Goals: To be achieved in: 2 weeks  - Independent in HEP and progression per patient tolerance, in order to prevent re-injury. [] Progressing: [] Met: [] Not Met: [] Adjusted  - Patient will have a decrease in pain to facilitate improvement in movement, function, and ADLs as indicated by Functional Deficits.     [] Progressing: [] Met: [] Not Met: [] Adjusted  Long Term Goals: To be achieved in: 8 weeks  - The patient is expected to demonstrate less than 18% impairment, limitation or restriction in:  - walking and moving around (mobility)   [] Progressing: [] Met: [] Not Met: [] Adjusted    - Patient will demonstrate an increase in LE and core Strength to 4/5 to allow for proper functional mobility as indicated by patients Functional Deficits.    [] Progressing: [] Met: [] Not Met: [] Adjusted  - Patient to demonstrate an increase in proximal LE and lumbar ROM to at least 75%  In order to perform more functional mobility activities. [] Progressing: [] Met: [] Not Met: [] Adjusted  - Patient will be able to walk across the store for work without increased symptoms or restriction. [] Progressing: [] Met: [] Not Met: [] Adjusted    Overall Progression Towards Functional goals/ Treatment Progress Update:  [] Patient is progressing as expected towards functional goals listed. [] Progression is slowed due to complexities/Impairments listed. [] Progression has been slowed due to co-morbidities. [x] Plan just implemented, too soon to assess goals progression <30days   [] Goals require adjustment due to lack of progress  [] Patient is not progressing as expected and requires additional follow up with physician  [] Other    Prognosis for POC: [x] Good [] Fair  [] Poor      Patient requires continued skilled intervention: [x] Yes  [] No    Treatment/Activity Tolerance:  [x] Patient able to complete treatment  [] Patient limited by fatigue  [] Patient limited by pain    [] Patient limited by other medical complications  [] Other:         Return to Play: (if applicable)   []  Stage 1: Intro to Strength   []  Stage 2: Return to Run and Strength   []  Stage 3: Return to Jump and Strength   []  Stage 4: Dynamic Strength and Agility   []  Stage 5: Sport Specific Training     []  Ready to Return to Play, Meets All Above Stages   []  Not Ready for Return to Sports   Comments:                               PLAN: See eval  [] Continue per plan of care [] Alter current plan (see comments above)  [x] Plan of care initiated [] Hold pending MD visit [] Discharge    Therapist was present, directed the patient's care, made skilled judgement, and was responsible for assessment and treatment of the patient.         Electronically signed by: Miguel Gonzalez, SPT,  Mark Hernandez PT    Note: If patient does not return for scheduled/ recommended follow up visits, this note will serve as a discharge from care along with most recent update on progress.

## 2020-09-28 ENCOUNTER — HOSPITAL ENCOUNTER (OUTPATIENT)
Dept: PHYSICAL THERAPY | Age: 85
Setting detail: THERAPIES SERIES
Discharge: HOME | End: 2020-09-28
Payer: MEDICARE

## 2020-09-28 PROCEDURE — 97112 NEUROMUSCULAR REEDUCATION: CPT

## 2020-09-28 NOTE — FLOWSHEET NOTE
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, lumbar region      ·      Insurance/Certification information:     Physician Information:     Has the plan of care been signed (Y/N):        []  Yes  [x]  No     Date of Patient follow up with Physician:       Is this a Progress Report:     []  Yes  [x]  No        If Yes:  Date Range for reporting period:  Beginning 2020  Ending    Progress report will be due (10 Rx or 30 days whichever is less):       Recertification will be due (POC Duration  / 90 days whichever is less):         Visit # Insurance Allowable Auth Required   2 unlimited []  Yes []  No        Functional Scale: Oswestry- 36%   Date assessed:  20     Latex Allergy:  [x]NO      []YES  Preferred Language for Healthcare:   [x]English       []other:      Pain level:  8/10     SUBJECTIVE:  Pt says R leg bothered him last visit but now that is a little better but now his L leg hurts. He says today he really hurts for no particular reason other than its a bad day. He says he tried to do the HEP from last time but not sure if he is doing them correctly. OBJECTIVE:    Observation:  Forward shoulder posture, dec lumbar lordosis, sits in PPT  Test measurements:  Trans ab strength: poor    RESTRICTIONS/PRECAUTIONS: pacemaker     Exercises/Interventions:       Therapeutic Ex (61397) Sets/sec Reps Notes/CUES   Hook lying rotation                                    Education                                    Manual Intervention (96477) NMR re-education (35477)   CUES NEEDED   PPT  20x 5\" Mod cues   TA isometrics  10 min Edu and breathing   TA/PPT marches  10x2 Max tactile and verbal cues for correct TA contraction and correct lumbopelvic stabilization   TA/PPT bent knee fallouts  10x2 Max cues   TA heel slides  10x2 Max cues                                 Therapeutic Activity (10610)                                             Exercises   · Supine Posterior Pelvic Tilt - 20 reps - 5 seconds hold - 1x daily - 7x weekly   · Supine March with Posterior Pelvic Tilt - 10 reps - 2 sets - 1x daily - 7x weekly   · Bent Knee Fallouts - 10 reps - 2 sets - 1x daily - 7x weekly   · Supine Lower Trunk Rotation - 5 reps - 10 second hold - 1x daily - 7x weekly           Therapeutic Exercise and NMR EXR  [] (53142) Provided verbal/tactile cueing for activities related to strengthening, flexibility, endurance, ROM for improvements in LE, proximal hip, and core control with self care, mobility, lifting, ambulation. [x] (21820) Provided verbal/tactile cueing for activities related to improving balance, coordination, kinesthetic sense, posture, motor skill, proprioception to assist with LE, proximal hip, and core control in self-care, mobility, lifting, ambulation and eccentric single leg control.      NMR and Therapeutic Activities:    [] (79580 or 57153) Provided verbal/tactile cueing for activities related to improving balance, coordination, kinesthetic sense, posture, motor skill, proprioception and motor activation to allow for proper function of core, proximal hip and LE with self-care and ADLs and functional mobility.   [] (87329) Gait Re-education- Provided training and instruction to the patient for proper LE, core and proximal hip recruitment and positioning and eccentric body weight control with ambulation re-education including up and down stairs     Home Exercise Program:    [x] (66515) Reviewed/Progressed HEP activities related to strengthening, flexibility, endurance, ROM of core, proximal hip and LE for functional self-care, mobility, lifting and ambulation/stair navigation   [] (10484) Reviewed/Progressed HEP activities related to improving balance, coordination, kinesthetic sense, posture, motor skill, proprioception of core, proximal hip and LE for self-care, mobility, lifting, and ambulation/stair navigation      Manual Treatments:  PROM / STM / Oscillations-Mobs:  G-I, II, III, IV (PA's, Inf., Post.)  [] (00682) Provided manual therapy to mobilize LE, proximal hip and/or LS spine soft tissue/joints for the purpose of modulating pain, promoting relaxation, increasing ROM, reducing/eliminating soft tissue swelling/inflammation/restriction, improving soft tissue extensibility and allowing for proper ROM for normal function with self-care, mobility, lifting and ambulation. Modalities:     [] GAME READY (VASO)- for significant edema, swelling, pain control. Charges:  Timed Code Treatment Minutes: 39'   Total Treatment Minutes: 39'      [] EVAL (LOW) 07370 (typically 20 minutes face-to-face)  [] EVAL (MOD) 59526 (typically 30 minutes face-to-face)  [] EVAL (HIGH) 61830 (typically 45 minutes face-to-face)  [] RE-EVAL     [] ROSA(83544) x     [] IONTO  [x] NMR (09294) x 3    [] VASO  [] Manual (65083) x     [] Other:  [] TA x      [] Mech Traction (54060)  [] ES(attended) (63385)      [] ES (un) (30334):         ASSESSMENT:  Pt needed max tactile and verbal cues through exercises today to ensure proper muscle recruitment of the trans ab without obliques or rectus or without holding breath. By end of session he felt more confident in how to do the exercises to be able to take them as an HEP and demonstrated about 70% accuracy with the therapist. He did feel a reduction in pain at 6/10 by end of session as well.       GOALS  Patient stated goal:    [] Progressing: [] Met: [] Not Met: [] Adjusted    Therapist goals for Patient:   Short Term Goals: To be achieved in: 2 weeks  - Independent in HEP and progression per patient tolerance, in order to prevent re-injury. [] Progressing: [] Met: [] Not Met: [] Adjusted  - Patient will have a decrease in pain to facilitate improvement in movement, function, and ADLs as indicated by Functional Deficits.     [] Progressing: [] Met: [] Not Met: [] Adjusted  Long Term Goals: To be achieved in: 8 weeks  - The patient is expected to demonstrate less than 18% impairment, limitation or restriction in:  - walking and moving around (mobility)   [] Progressing: [] Met: [] Not Met: [] Adjusted    - Patient will demonstrate an increase in LE and core Strength to 4/5 to allow for proper functional mobility as indicated by patients Functional Deficits. [] Progressing: [] Met: [] Not Met: [] Adjusted  - Patient to demonstrate an increase in proximal LE and lumbar ROM to at least 75%  In order to perform more functional mobility activities. [] Progressing: [] Met: [] Not Met: [] Adjusted  - Patient will be able to walk across the store for work without increased symptoms or restriction. [] Progressing: [] Met: [] Not Met: [] Adjusted    Overall Progression Towards Functional goals/ Treatment Progress Update:  [] Patient is progressing as expected towards functional goals listed. [] Progression is slowed due to complexities/Impairments listed. [] Progression has been slowed due to co-morbidities.   [x] Plan just implemented, too soon to assess goals progression <30days   [] Goals require adjustment due to lack of progress  [] Patient is not progressing as expected and requires additional follow up with physician  [] Other    Prognosis for POC: [x] Good [] Fair  [] Poor      Patient requires continued skilled intervention: [x] Yes  [] No    Treatment/Activity Tolerance:  [x] Patient able to complete treatment  [] Patient limited by fatigue  [] Patient limited by pain    [] Patient limited by other medical complications  [] Other:                      PLAN: See eval. Next visit: review accuracy of HEP and progress core stabilization with leg [place], supine clamshell, HS stretch, S/L hip abd  [x] Continue per plan of care [] Alter current plan (see comments above)  [] Plan of care initiated [] Hold pending MD visit [] Discharge      Electronically signed by:  Ragini Carmichael PT    Note: If patient does not return for scheduled/ recommended follow up visits, this note will serve as a discharge from care along with most recent update on progress.

## 2020-10-02 ENCOUNTER — HOSPITAL ENCOUNTER (OUTPATIENT)
Dept: PHYSICAL THERAPY | Age: 85
Setting detail: THERAPIES SERIES
Discharge: HOME | End: 2020-10-02
Payer: MEDICARE

## 2020-10-02 PROCEDURE — 97110 THERAPEUTIC EXERCISES: CPT | Performed by: PHYSICAL THERAPIST

## 2020-10-02 PROCEDURE — 97112 NEUROMUSCULAR REEDUCATION: CPT | Performed by: PHYSICAL THERAPIST

## 2020-10-02 PROCEDURE — 97140 MANUAL THERAPY 1/> REGIONS: CPT | Performed by: PHYSICAL THERAPIST

## 2020-10-04 NOTE — FLOWSHEET NOTE
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, lumbar region      ·      Insurance/Certification information:     Physician Information:     Has the plan of care been signed (Y/N):        []  Yes  [x]  No     Date of Patient follow up with Physician:       Is this a Progress Report:     []  Yes  [x]  No        If Yes:  Date Range for reporting period:  Beginning 2020  Ending    Progress report will be due (10 Rx or 30 days whichever is less):       Recertification will be due (POC Duration  / 90 days whichever is less):         Visit # Insurance Allowable Auth Required   3 unlimited []  Yes []  No        Functional Scale: Oswestry- 36%   Date assessed:  20     Latex Allergy:  [x]NO      []YES  Preferred Language for Healthcare:   [x]English       []other:      Pain level:  8/10     SUBJECTIVE: \"about the same. Geraldine Garcia i do like the therapy though\"      OBJECTIVE:   Observation:   Mild improvements with t abd initiation     RESTRICTIONS/PRECAUTIONS: pacemaker     Exercises/Interventions:       Therapeutic Ex (88850) Sets/sec Reps Notes/CUES   Hook lying rotation                                    Education                                    Manual Intervention (70783)      Prom/stm   16'                                  NMR re-education (17990)   CUES NEEDED   PPT  20x 5\" Mod cues   TA isometrics  10 min Edu and breathing   TA/PPT marches  10x2 Max tactile and verbal cues for correct TA contraction and correct lumbopelvic stabilization   TA/PPT bent knee fallouts  10x2 Max cues   TA heel slides  10x2 Max cues                                 Therapeutic Activity (26641)                                             Exercises   · Supine Posterior Pelvic Tilt - 20 reps - 5 seconds hold - 1x daily - 7x weekly   · Supine March with Posterior Pelvic Tilt - 10 reps - 2 sets - 1x daily - 7x weekly   · Bent Knee Fallouts - 10 reps - 2 sets - 1x daily - 7x weekly   · Supine Lower Trunk Rotation - 5 reps - 10 second hold - 1x daily - 7x weekly           Therapeutic Exercise and NMR EXR  [] (63117) Provided verbal/tactile cueing for activities related to strengthening, flexibility, endurance, ROM for improvements in LE, proximal hip, and core control with self care, mobility, lifting, ambulation. [x] (60664) Provided verbal/tactile cueing for activities related to improving balance, coordination, kinesthetic sense, posture, motor skill, proprioception to assist with LE, proximal hip, and core control in self-care, mobility, lifting, ambulation and eccentric single leg control.      NMR and Therapeutic Activities:    [] (29321 or 52720) Provided verbal/tactile cueing for activities related to improving balance, coordination, kinesthetic sense, posture, motor skill, proprioception and motor activation to allow for proper function of core, proximal hip and LE with self-care and ADLs and functional mobility.   [] (28377) Gait Re-education- Provided training and instruction to the patient for proper LE, core and proximal hip recruitment and positioning and eccentric body weight control with ambulation re-education including up and down stairs     Home Exercise Program:    [x] (06472) Reviewed/Progressed HEP activities related to strengthening, flexibility, endurance, ROM of core, proximal hip and LE for functional self-care, mobility, lifting and ambulation/stair navigation   [] (36955) Reviewed/Progressed HEP activities related to improving balance, coordination, kinesthetic sense, posture, motor skill, proprioception of core, proximal hip and LE for self-care, mobility, lifting, and ambulation/stair navigation      Manual Treatments:  PROM / STM / Oscillations-Mobs:  G-I, II, III, IV (PA's, Inf., Post.)  [] (33106) Provided manual therapy to mobilize LE, proximal hip and/or LS spine soft tissue/joints for the purpose of modulating pain, promoting relaxation, increasing ROM, reducing/eliminating soft tissue swelling/inflammation/restriction, improving soft tissue extensibility and allowing for proper ROM for normal function with self-care, mobility, lifting and ambulation. Modalities:     [] GAME READY (VASO)- for significant edema, swelling, pain control. Charges:  Timed Code Treatment Minutes: 45'   Total Treatment Minutes: 39'      [] EVAL (LOW) 78711 (typically 20 minutes face-to-face)  [] EVAL (MOD) 73705 (typically 30 minutes face-to-face)  [] EVAL (HIGH) 71063 (typically 45 minutes face-to-face)  [] RE-EVAL     [x] EQ(16470) x   1  [] IONTO  [x] NMR (16042) x 1    [] VASO  [x] Manual (34237) x  1   [] Other:  [] TA x      [] Mech Traction (49015)  [] ES(attended) (19767)      [] ES (un) (48513):         ASSESSMENT:  Pt needed mod tactile and verbal cues through exercises today to ensure proper muscle recruitment of the trans ab without obliques or rectus or without holding breath. GOALS  Patient stated goal:    [] Progressing: [] Met: [] Not Met: [] Adjusted    Therapist goals for Patient:   Short Term Goals: To be achieved in: 2 weeks  - Independent in HEP and progression per patient tolerance, in order to prevent re-injury. [] Progressing: [] Met: [] Not Met: [] Adjusted  - Patient will have a decrease in pain to facilitate improvement in movement, function, and ADLs as indicated by Functional Deficits.     [] Progressing: [] Met: [] Not Met: [] Adjusted  Long Term Goals:  To be achieved in: 8 weeks  - The patient is expected to demonstrate less than 18% impairment, limitation or restriction in:  - walking and moving around (mobility)   [] Progressing: [] Met: [] Not Met: [] Adjusted    - Patient will demonstrate an increase in LE and core Strength to 4/5 to allow for proper functional mobility as indicated by patients Functional Deficits. [x] Progressing: [] Met: [] Not Met: [] Adjusted  - Patient to demonstrate an increase in proximal LE and lumbar ROM to at least 75%  In order to perform more functional mobility activities. 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 [x] Progressing: [] Met: [] Not Met: [] Adjusted    Overall Progression Towards Functional goals/ Treatment Progress Update:  [] Patient is progressing as expected towards functional goals listed. [] Progression is slowed due to complexities/Impairments listed. [] Progression has been slowed due to co-morbidities.   [x] Plan just implemented, too soon to assess goals progression <30days   [] Goals require adjustment due to lack of progress  [] Patient is not progressing as expected and requires additional follow up with physician  [] Other    Prognosis for POC: [x] Good [] Fair  [] Poor      Patient requires continued skilled intervention: [x] Yes  [] No    Treatment/Activity Tolerance:  [x] Patient able to complete treatment  [] Patient limited by fatigue  [] Patient limited by pain    [] Patient limited by other medical complications  [] Other:                      PLAN: See eval. Next visit: review accuracy of HEP and progress core stabilization with leg [place], supine clamshell, HS stretch, S/L hip abd  [x] Continue per plan of care [] Alter current plan (see comments above)  [] Plan of care initiated [] Hold pending MD visit [] Discharge      Electronically signed by:  Constantino Elliott, PT, MPT     Note: If patient does not return for scheduled/ recommended follow up visits, this note will serve as a discharge from care along with most recent update on progress.

## 2020-10-05 ENCOUNTER — HOSPITAL ENCOUNTER (OUTPATIENT)
Dept: PHYSICAL THERAPY | Age: 85
Setting detail: THERAPIES SERIES
Discharge: HOME | End: 2020-10-05
Payer: MEDICARE

## 2020-10-05 PROCEDURE — 97112 NEUROMUSCULAR REEDUCATION: CPT

## 2020-10-05 PROCEDURE — 97110 THERAPEUTIC EXERCISES: CPT

## 2020-10-05 NOTE — FLOWSHEET NOTE
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, lumbar region      ·      Insurance/Certification information:     Physician Information:     Has the plan of care been signed (Y/N):        []  Yes  [x]  No     Date of Patient follow up with Physician:       Is this a Progress Report:     []  Yes  [x]  No        If Yes:  Date Range for reporting period:  Beginning 2020  Ending    Progress report will be due (10 Rx or 30 days whichever is less):       Recertification will be due (POC Duration  / 90 days whichever is less):         Visit # Insurance Allowable Auth Required   4 unlimited []  Yes []  No        Functional Scale: Oswestry- 36%   Date assessed:  20     Latex Allergy:  [x]NO      []YES  Preferred Language for Healthcare:   [x]English       []other:      Pain level:  8/10     SUBJECTIVE: Pt was watching [employer] (MM) game in reclining chair and comfortable and after he that he had severe back and buttock pain and couldn't sleep. He feels like Pt is not really helping or that he just has too much pain to really make progress with PT. He knows he is taking way to much Tylenol to help with the pain and that concerns him. We dicussed options about going back to the doctor.        OBJECTIVE:   Observation:    Giat: antalgic with Dec WB on L LE and forward flex torso and slower pace and smaller step length B    RESTRICTIONS/PRECAUTIONS: pacemaker     Exercises/Interventions:       Therapeutic Ex (90734) Sets/sec Reps Notes/CUES   Hook lying rotation      Hooklying clamshell w/ TA  10x2 green    Nerve glides: knee ext only  2x10 B    HS strap stretch  2x30\" B    Supine TA leg circles  2x5 CW/CCW          Education  10 min  On tball Posture and neutral pelvis, avoid reclining chairs                                 Manual Intervention ([phone])      Prom/stm                                     NMR re-education (08579)   CUES NEEDED   PPT   Mod cues   TA isometrics   Edu and breathing   TA/PPT marches  10x1 Max tactile and verbal cues for correct TA contraction and correct lumbopelvic stabilization   TA/PPT bent knee fallouts  10x1 Max cues   TA heel slides  10x1 Max cues                                 Therapeutic Activity (53890)                                             Exercises   · Supine Posterior Pelvic Tilt - 20 reps - 5 seconds hold - 1x daily - 7x weekly   · Supine March with Posterior Pelvic Tilt - 10 reps - 2 sets - 1x daily - 7x weekly   · Bent Knee Fallouts - 10 reps - 2 sets - 1x daily - 7x weekly   · Supine Lower Trunk Rotation - 5 reps - 10 second hold - 1x daily - 7x weekly           Therapeutic Exercise and NMR EXR  [x] (18284) Provided verbal/tactile cueing for activities related to strengthening, flexibility, endurance, ROM for improvements in LE, proximal hip, and core control with self care, mobility, lifting, ambulation. [x] (75338) Provided verbal/tactile cueing for activities related to improving balance, coordination, kinesthetic sense, posture, motor skill, proprioception to assist with LE, proximal hip, and core control in self-care, mobility, lifting, ambulation and eccentric single leg control. NMR and Therapeutic Activities:    [] (21730 or 66190) Provided verbal/tactile cueing for activities related to improving balance, coordination, kinesthetic sense, posture, motor skill, proprioception and motor activation to allow for proper function of core, proximal hip and LE with self-care and ADLs and functional mobility. [] (00952) Gait Re-education- Provided training and instruction to the patient for proper LE, core and proximal hip recruitment and positioning and eccentric body weight control with ambulation re-education including up and down stairs     Home Exercise Program:    [x] (24577) Reviewed/Progressed HEP activities related to strengthening, flexibility, endurance, ROM of core, proximal hip and LE for functional self-care, mobility, lifting and ambulation/stair navigation   [] (41185) Reviewed/Progressed HEP activities related to improving balance, coordination, kinesthetic sense, posture, motor skill, proprioception of core, proximal hip and LE for self-care, mobility, lifting, and ambulation/stair navigation      Manual Treatments:  PROM / STM / Oscillations-Mobs:  G-I, II, III, IV (PA's, Inf., Post.)  [] (04331) Provided manual therapy to mobilize LE, proximal hip and/or LS spine soft tissue/joints for the purpose of modulating pain, promoting relaxation, increasing ROM, reducing/eliminating soft tissue swelling/inflammation/restriction, improving soft tissue extensibility and allowing for proper ROM for normal function with self-care, mobility, lifting and ambulation. Modalities:     [] GAME READY (VASO)- for significant edema, swelling, pain control. Charges:  Timed Code Treatment Minutes: 45'   Total Treatment Minutes: 39'      [] EVAL (LOW) 455 1011 (typically 20 minutes face-to-face)  [] EVAL (MOD) 24085 (typically 30 minutes face-to-face)  [] EVAL (HIGH) 30285 (typically 45 minutes face-to-face)  [] RE-EVAL     [x] MA(34898) x   2  [] IONTO  [x] NMR (69811) x 1    [] VASO  [] Manual (42707) x     [] Other:  [] TA x      [] Mech Traction (38566)  [] ES(attended) (39139)      [] ES (un) (90239):         ASSESSMENT:  Despite using different techniques pt's pain is seemingly not responding to PT so we discussed option for returning to his physician.  Pt has decided to give pt one more visit as scheduled and if No    Treatment/Activity Tolerance:  [x] Patient able to complete treatment  [] Patient limited by fatigue  [x] Patient limited by pain    [] Patient limited by other medical complications  [] Other:                      PLAN: See eval. Next visit: discuss referral back to MD if symptoms aren't improving  [x] Continue per plan of care [] Alter current plan (see comments above)  [] Plan of care initiated [] Hold pending MD visit [] Discharge      Electronically signed by:  Gris Person PT, MPT     Note: If patient does not return for scheduled/ recommended follow up visits, this note will serve as a discharge from care along with most recent update on progress.

## 2020-10-07 ENCOUNTER — HOSPITAL ENCOUNTER (OUTPATIENT)
Dept: PHYSICAL THERAPY | Age: 85
Setting detail: THERAPIES SERIES
Discharge: HOME | End: 2020-10-07
Payer: MEDICARE

## 2020-10-07 PROCEDURE — 97110 THERAPEUTIC EXERCISES: CPT | Performed by: PHYSICAL THERAPIST

## 2020-10-07 PROCEDURE — 97140 MANUAL THERAPY 1/> REGIONS: CPT | Performed by: PHYSICAL THERAPIST

## 2020-10-07 NOTE — FLOWSHEET NOTE
instruction to the patient for proper LE, core and proximal hip recruitment and positioning and eccentric body weight control with ambulation re-education including up and down stairs     Home Exercise Program:    [x] (71423) Reviewed/Progressed HEP activities related to strengthening, flexibility, endurance, ROM of core, proximal hip and LE for functional self-care, mobility, lifting and ambulation/stair navigation   [] (76013) Reviewed/Progressed HEP activities related to improving balance, coordination, kinesthetic sense, posture, motor skill, proprioception of core, proximal hip and LE for self-care, mobility, lifting, and ambulation/stair navigation      Manual Treatments:  PROM / STM / Oscillations-Mobs:  G-I, II, III, IV (PA's, Inf., Post.)  [x] (17970) Provided manual therapy to mobilize LE, proximal hip and/or LS spine soft tissue/joints for the purpose of modulating pain, promoting relaxation, increasing ROM, reducing/eliminating soft tissue swelling/inflammation/restriction, improving soft tissue extensibility and allowing for proper ROM for normal function with self-care, mobility, lifting and ambulation. Modalities:     [] GAME READY (VASO)- for significant edema, swelling, pain control. Charges:  Timed Code Treatment Minutes: 39'   Total Treatment Minutes: 39'      [] EVAL (LOW) 05270 (typically 20 minutes face-to-face)  [] EVAL (MOD) 99051 (typically 30 minutes face-to-face)  [] EVAL (HIGH) 59160 (typically 45 minutes face-to-face)  [] RE-EVAL     [x] FZ(67735) x   1  [] IONTO  [] NMR (53508) x     [] VASO  [x] Manual (41861) x 2     [] Other:  [] TA x      [] Mech Traction (31199)  [] ES(attended) (22846)      [] ES (un) (89953):         ASSESSMENT:  Reported decrease in pain and increased mobility immediately after treatment but began having increased symptoms with standing for 5 minutes. Minimal carryover with effectiveness of treatment.      GOALS  Patient stated goal:    [] Progressing: [] Met: [] Not Met: [] Adjusted    Therapist goals for Patient:   Short Term Goals: To be achieved in: 2 weeks  - Independent in HEP and progression per patient tolerance, in order to prevent re-injury. [] Progressing: [] Met: [] Not Met: [] Adjusted  - Patient will have a decrease in pain to facilitate improvement in movement, function, and ADLs as indicated by Functional Deficits.     [] Progressing: [] Met: [] Not Met: [] Adjusted  Long Term Goals: To be achieved in: 8 weeks  - The patient is expected to demonstrate less than 18% impairment, limitation or restriction in:  - walking and moving around (mobility)   [] Progressing: [] Met: [] Not Met: [] Adjusted    - Patient will demonstrate an increase in LE and core Strength to 4/5 to allow for proper functional mobility as indicated by patients Functional Deficits. [x] Progressing: [] Met: [] Not Met: [] Adjusted  - Patient to demonstrate an increase in proximal LE and lumbar ROM to at least 75%  In order to perform more functional mobility activities. 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 [x] Progressing: [] Met: [] Not Met: [] Adjusted    Overall Progression Towards Functional goals/ Treatment Progress Update:  [] Patient is progressing as expected towards functional goals listed. [] Progression is slowed due to complexities/Impairments listed. [] Progression has been slowed due to co-morbidities.   [x] Plan just implemented, too soon to assess goals progression <30days   [] Goals require adjustment due to lack of progress  [] Patient is not progressing as expected and requires additional follow up with physician  [] Other    Prognosis for POC: [x] Good [] Fair  [] Poor      Patient requires continued skilled intervention: [x] Yes  [] No    Treatment/Activity Tolerance:  [x] Patient able to complete treatment  [] Patient limited by fatigue  [x] Patient limited by pain    [] Patient limited by other medical complications  [] Other:                      PLAN: See eval. Next visit: discuss referral back to MD if symptoms aren't improving  [x] Continue per plan of care [] Alter current plan (see comments above)  [] Plan of care initiated [] Hold pending MD visit [] Discharge      Electronically signed by: Melvin Cardenas PT, OMT-C      Note: If patient does not return for scheduled/ recommended follow up visits, this note will serve as a discharge from care along with most recent update on progress.

## 2020-10-08 ENCOUNTER — TELEPHONE (OUTPATIENT)
Dept: ORTHOPEDIC SURGERY | Age: 85
End: 2020-10-08

## 2020-10-08 NOTE — TELEPHONE ENCOUNTER
S/W PATIENT who states the Medrol provided him significant relief. Discussed, per provider, rx of Meloxicam and also having him call the office on 10/30/2020 to discuss his symptoms to submit for MRI. Patient voiced understanding of the conversation and will contact the office with further questions or concerns.

## 2020-10-08 NOTE — TELEPHONE ENCOUNTER
PATIENT IS IN A LOT OF PAIN. DOES BRIAN HAVE ANY SUGGESTIONS TO MINIMIZE THE PAIN BESIDES THE MEDICATION HE HAS BEEN TAKING. OTHER

## 2020-10-09 ENCOUNTER — TELEPHONE (OUTPATIENT)
Dept: ORTHOPEDIC SURGERY | Age: 85
End: 2020-10-09

## 2020-10-09 RX ORDER — TIZANIDINE 2 MG/1
2 TABLET ORAL EVERY EVENING
Qty: 30 TABLET | Refills: 0 | Status: SHIPPED | OUTPATIENT
Start: 2020-10-09 | End: 2021-06-21 | Stop reason: ALTCHOICE

## 2020-10-09 RX ORDER — MELOXICAM 15 MG/1
15 TABLET ORAL DAILY
Qty: 30 TABLET | Refills: 0 | Status: ON HOLD | OUTPATIENT
Start: 2020-10-09 | End: 2021-01-06 | Stop reason: ALTCHOICE

## 2020-10-09 NOTE — TELEPHONE ENCOUNTER
PATIENT CALLED AGAIN ABOUT HIS MELOXICAM AND I ADVISED OF RICHARD'S BELOW MESSAGE AS WELL AS INFORMED HIM THAT THE SCRIPT HAS BEEN SENT THRU AND TO ALLOW TIME FOR PHARMACY TO GET IT AND PREPARE IT AND HE SAID OK.

## 2020-10-09 NOTE — TELEPHONE ENCOUNTER
Patient was advised on 10/8/2020 that medication will be sent into the pharmacy today. The Meloxicam has been sent and patient should be contacted by the pharmacy when it is available for . Patient will be notified today of this, however if he calls back, please relay the message.

## 2020-10-09 NOTE — TELEPHONE ENCOUNTER
PATIENT'S WIFE IS CALLING REGARDING HIS PAIN LEVEL, SHE WOULD LIKE TO KNOW WHAT THE NEXT STEP WOULD BE, CAN BE REACHED [phone]

## 2020-10-09 NOTE — TELEPHONE ENCOUNTER
Patient is calling to see if Rx has been called in because he's in a lot of pain. Please call him to let him know when this is called in to [address] [place] [address]. at [phone]. Patient can be reached at [phone].

## 2020-10-12 ENCOUNTER — HOSPITAL ENCOUNTER (OUTPATIENT)
Dept: PHYSICAL THERAPY | Age: 85
Setting detail: THERAPIES SERIES
Discharge: HOME | End: 2020-10-12
Payer: MEDICARE

## 2020-10-12 ENCOUNTER — OFFICE VISIT (OUTPATIENT)
Dept: ORTHOPEDIC SURGERY | Age: 85
End: 2020-10-12
Payer: MEDICARE

## 2020-10-12 VITALS — HEIGHT: 72 IN | TEMPERATURE: 97.3 F | WEIGHT: 196.43 LBS | RESPIRATION RATE: 14 BRPM | BODY MASS INDEX: 26.61 KG/M2

## 2020-10-12 PROCEDURE — 97110 THERAPEUTIC EXERCISES: CPT

## 2020-10-12 PROCEDURE — 99214 OFFICE O/P EST MOD 30 MIN: CPT | Performed by: STUDENT IN AN ORGANIZED HEALTH CARE EDUCATION/TRAINING PROGRAM

## 2020-10-12 PROCEDURE — 97140 MANUAL THERAPY 1/> REGIONS: CPT

## 2020-10-12 RX ORDER — TRAMADOL HYDROCHLORIDE 50 MG/1
50 TABLET ORAL 2 TIMES DAILY
Qty: 14 TABLET | Refills: 0 | Status: SHIPPED | OUTPATIENT
Start: 2020-10-12 | End: 2020-10-19

## 2020-10-12 NOTE — PROGRESS NOTES
to achieve upright torso now and dec WB on L LE(decline since eval.)     Gait: severely antalgic with dec WB on L LE and forward flex torso and slower pace and smaller step length B and now using a rollator walker whereas just last week ambulating without AD.      ROM L - Eval R - Eval L 10/12/20 R 10/12/20   Lumbar flexion 75% - 75% -   Lumbar extension 25% painful - Lack of 15% to neutral, severe pain -   Lumbar side bending 1\" from knee jt 1\" from knee 10% 5\" from knee 20% 3\" from knee   Lumbar rotation 50% 50% 10% pain 25%      Strength/Neuromuscular control: Jae LE-: 4- /5 at eval   EVAL 4-/5 bilat all LE L  10/12/20 R  10/12/20   Hip flex  4/5 4/5   Hip abd  4-/5 4/5   Hip add  4/5 4/5   Hip ext  4-/5 4-/5   Knee ext  3+/5 4/5   Knee flex  3+/5 4+/5   Ankle Df  4/5 5/5   Great toe Ext  4/5 5/5   Ankle PF  3+/5 4+/5        Dermatomal Sensation:  - Dermatomal sensation was intact to light touch bilaterally throughout upper and lower extremities.        Deep tendon reflexes:  - DTR's were symmetrical and intact throughout at eval. Today absent L Achilles DTR, B patella tendon normal, R achilles normal.         Joint mobility: hypomobility lumbar continues to be present with now more muscle guarding likely due to increased baseline pain.      Flexibility: Jae HS flexibility = -25 deg still present since eval     Palpation: mild TTP R & L paraspinal , L glutes and piriformis     Functional Mobility/Transfers: Indep with all transfers; slow with bed mobility at eval; currently pt is min A with supine <> sit, min A with bed rolling, SBA with sit<>stand        RESTRICTIONS/PRECAUTIONS: pacemaker     Exercises/Interventions:       Therapeutic Ex (22226) Sets/sec Reps Notes/CUES   Hook lying rotation      Hooklying clamshell w/ TA  10x2 green    Nerve glides: knee ext only     HS strap stretch     Supine TA leg circles           Education  10 min   Lack of progress and referral back to MD reasoning, answering pt questions, discussing how to cont HEP as able                                 Manual Intervention ([id number])      /stm QL, paraspinals, glutes, piriformis, prox HS  20'    Manual traction, L gapping  3'                            NMR re-education (20677)   CUES NEEDED   PPT   Mod cues   TA isometrics   Edu and breathing   TA/PPT marches  Max tactile and verbal cues for correct TA contraction and correct lumbopelvic stabilization   TA/PPT bent knee fallouts  Max cues   TA heel slides  Max cues                                 Therapeutic Activity (61062)                                             Exercises   · Supine Posterior Pelvic Tilt - 20 reps - 5 seconds hold - 1x daily - 7x weekly   · Supine March with Posterior Pelvic Tilt - 10 reps - 2 sets - 1x daily - 7x weekly   · Bent Knee Fallouts - 10 reps - 2 sets - 1x daily - 7x weekly   · Supine Lower Trunk Rotation - 5 reps - 10 second hold - 1x daily - 7x weekly           Therapeutic Exercise and NMR EXR  [] (03841) Provided verbal/tactile cueing for activities related to strengthening, flexibility, endurance, ROM for improvements in LE, proximal hip, and core control with self care, mobility, lifting, ambulation.  [] (96415) Provided verbal/tactile cueing for activities related to improving balance, coordination, kinesthetic sense, posture, motor skill, proprioception to assist with LE, proximal hip, and core control in self-care, mobility, lifting, ambulation and eccentric single leg control.      NMR and Therapeutic Activities:    [] (01474 or 80095) Provided verbal/tactile cueing for activities related to improving balance, coordination, kinesthetic sense, posture, motor skill, proprioception and motor activation to allow for proper function of core, proximal hip and LE with self-care and ADLs and functional mobility.   [] (72914) Gait Re-education- Provided training and instruction to the patient for proper LE, core and proximal hip recruitment and positioning and eccentric body weight control with ambulation re-education including up and down stairs     Home Exercise Program:    [x] (58626) Reviewed/Progressed HEP activities related to strengthening, flexibility, endurance, ROM of core, proximal hip and LE for functional self-care, mobility, lifting and ambulation/stair navigation   [] (14056) Reviewed/Progressed HEP activities related to improving balance, coordination, kinesthetic sense, posture, motor skill, proprioception of core, proximal hip and LE for self-care, mobility, lifting, and ambulation/stair navigation      Manual Treatments:  PROM / STM / Oscillations-Mobs:  G-I, II, III, IV (PA's, Inf., Post.)  [x] (41259) Provided manual therapy to mobilize LE, proximal hip and/or LS spine soft tissue/joints for the purpose of modulating pain, promoting relaxation, increasing ROM, reducing/eliminating soft tissue swelling/inflammation/restriction, improving soft tissue extensibility and allowing for proper ROM for normal function with self-care, mobility, lifting and ambulation. Modalities:     [] GAME READY (VASO)- for significant edema, swelling, pain control. Charges:  Timed Code Treatment Minutes: 40'   Total Treatment Minutes: 36'      [] EVAL (LOW) 71266 (typically 20 minutes face-to-face)  [] EVAL (MOD) 08264 (typically 30 minutes face-to-face)  [] EVAL (HIGH) 24489 (typically 45 minutes face-to-face)  [] RE-EVAL     [x] AC(77599) x   1  [] IONTO  [] NMR (19657) x     [] VASO  [x] Manual (32027) x 2     [] Other:  [] TA x      [] Mech Traction (68182)  [] ES(attended) (65060)      [] ES (un) (69626):         ASSESSMENT:  Pt is not improving at this time with PT.  His pain has continued to worsen and is now also showing signs of dec DTR in L LE, new weakness in L LE that may be myotome involvement, and showing a decline in function (Owestry disability score from 36% to now 60% disability) and ambulation with now needing to use rollator walker, unable to achieve a neutral upright posture and dec WB on the L LE. I am recommending that he return back to physician to address his pain and decline in objective measures. Pt is in agreement with this plan. GOALS  Patient stated goal:    [] Progressing: [] Met: [] Not Met: [] Adjusted    Therapist goals for Patient:   Short Term Goals: To be achieved in: 2 weeks  - Independent in HEP and progression per patient tolerance, in order to prevent re-injury. [] Progressing: [] Met: [x] Not Met: [] Adjusted  - Patient will have a decrease in pain to facilitate improvement in movement, function, and ADLs as indicated by Functional Deficits.     [] Progressing: [] Met: [x] Not Met: [] Adjusted  Long Term Goals: To be achieved in: 8 weeks  - The patient is expected to demonstrate less than 18% impairment, limitation or restriction in:  - walking and moving around (mobility)   [] Progressing: [] Met: [] Not Met: [] Adjusted    - Patient will demonstrate an increase in LE and core Strength to 4/5 to allow for proper functional mobility as indicated by patients Functional Deficits. [x] Progressing: [] Met: [] Not Met: [] Adjusted  - Patient to demonstrate an increase in proximal LE and lumbar ROM to at least 75%  In order to perform more functional mobility activities. 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 [x] Progressing: [] Met: [] Not Met: [] Adjusted    Overall Progression Towards Functional goals/ Treatment Progress Update:  [] Patient is progressing as expected towards functional goals listed. [] Progression is slowed due to complexities/Impairments listed. [] Progression has been slowed due to co-morbidities.   [] Plan just implemented, too soon to assess goals progression <30days   [] Goals require adjustment due to lack of progress  [x] Patient is not progressing as expected and requires additional follow up with physician  [] Other    Prognosis for POC: [x] Good [] Fair  [] Poor      Patient requires continued skilled intervention: [x] Yes  [] No    Treatment/Activity Tolerance:  [] Patient able to complete treatment  [] Patient limited by fatigue  [x] Patient limited by pain    [] Patient limited by other medical complications  [] Other:                      PLAN: Pt is being placed on hold in PT as level of pain is limiting progression as tolerance as well as concern for decline in function, loosing L LE strength, reduction in L LE DTR and is being referred by to his physician. Once pain and symptoms are better under control pt would benefit returning to PT to address posture, gait, strength, and flexibility. [] Continue per plan of care [] Alter current plan (see comments above)  [] Plan of care initiated [x] Hold pending MD visit [] Discharge       Electronically signed by: Olivia Pickard, PT, DPT, AT, OCS      Note: If patient does not return for scheduled/ recommended follow up visits, this note will serve as a discharge from care along with most recent update on progress.

## 2020-10-12 NOTE — PROGRESS NOTES
Follow up: Eulogio Parikh  1935  [id number]         Chief Complaint   Patient presents with    Lower Back Pain     c/o increased LSP pain     Leg Pain     new L leg pain         HISTORY OF PRESENT ILLNESS:  Mr. Emily Savage is a 82 y.o. male returns for a follow up visit for multiple medical problems. His current presenting problems are   1. Lumbar radiculopathy    2. Spondylosis without myelopathy or radiculopathy, lumbar region    3. DDD (degenerative disc disease), lumbar    . As per information/history obtained from the PADT(patient assessment and documentation tool) - He complains of pain in the lower back with radiation to the upper leg Bilateral He rates the pain 9/10 and describes it as sharp, aching. Pain is made worse by: movement. He denies side effects from the current pain regimen. Patient reports that since the last follow up visit the physical functioning is worse, family/social relationships are worse, mood is worse and sleep patterns are worse, and that the overall functioning is worse. Patient denies neurological bowel or bladder. Mr. Emily Savage presents today complaining of worsening pain since last Thursday. The patient's daughter did mention on 10/8/2020 that the patient was working in the yard which may have triggered new symptoms. The Medrol Dosepak he was previously prescribed helped his symptoms. He has attended five physical therapy visits since his last visit, as the CT of his lumbar spine was denied due to lack of conservative care. The patient reports physical therapy has made his pain and functioning worse. The patient was prescribed Meloxicam which he picked up on 10/9/2020. He was then prescribed Zanaflex that day as well. Today he reports minimal improvement with these entities. He states his right leg pain is gone, but now he has new left leg pain started Friday.  He further states that he does not feel he can walk or bear weight on his left leg which causes him to feel very unsteady. He has experienced functional decline since Thursday and now utilizes a Rolator for assistance with ambulation. He is requesting something to help his symptoms more than what he has experienced thus far. Oral swab testing completed today in the office. Adverse Events to opioids:  Patient currently on a bowel regiment? No   A. Nausea? No   B. Vomiting? No   C. Constipation? No   D. Itching? No   E. Drowsiness? No  Are these new symptoms since last visit? No    Potential Aberrant Drug-Related Behavior:   - Aberrant behavior identified? No   - Potential aberrant behavior identified? No   - Reports lost or stolen prescriptions? No   - Insists on certain medications by name? No   - Purposeful over-sedation? No   - Increased dose without authorization? No    Analgesia Medications: Well Controlled  Activities of Daily Livin. Activity Level- Patient performs independently  2. Sleep Patterns- Patient performs independently  3. Motivation- Patient performs independently  4. Functional Level-   A) Bathing- Patient performs independently   B) Dressing- Patient performs independently   C) Transferring- Patient performs independently   D) Walking- Patient performs independently   E) Eating- Patient performs independently   F) Toilet Use- Patient performs independently   G) Personal Hygiene- Patient performs independently  Adverse Effects:None  Aberrant Behavior:None  · Mental health screening:   · Suffering from sadness: no   · Have you had a loss of a significant other or pet in the last 6 months: no  · Do other people say you worry too much? no   · Any significant change in your life that concerns you? no       Associated signs and symptoms:   Neurogenic bowel or bladder symptoms:  no   Perceived weakness:  yes   Difficulty walking:  yes            Past medical, surgical, social and family history reviewed with the patient. No pertinent relevant history.    Past Medical History:   Past Medical History: Diagnosis Date    Aortic valve disease     Arthritis     BPH (benign prostatic hyperplasia)     [facility] artery vasospasm ([facility] [address]     Diverticulosis     H/O squamous cell carcinoma of skin     Hematuria 7/26/2017    HTN (hypertension)     Hyperlipidemia     Mixed hyperlipidemia 1/7/2016    Osteoarthritis of right knee     PSA elevation       Past Surgical History:     Past Surgical History:   Procedure Laterality Date    APPENDECTOMY  1970    CARDIAC PACEMAKER PLACEMENT  12/2016    Walter Mann MD    COSMETIC SURGERY  2010    skin cancer - base of nose    HERNIA REPAIR  2011    JOINT REPLACEMENT  7/21/2015    Radha Funk MD    LAPAROTOMY  1970    exploratoryt for perf diverticulum    PROSTATE SURGERY  2011    biopsy - Kai    TONSILLECTOMY AND ADENOIDECTOMY  as a child     Current Medications:     Current Outpatient Medications:     traMADol (ULTRAM) 50 MG tablet, Take 1 tablet by mouth 2 times daily for 7 days. , Disp: 14 tablet, Rfl: 0    meloxicam (MOBIC) 15 MG tablet, Take 1 tablet by mouth daily, Disp: 30 tablet, Rfl: 0    tiZANidine (ZANAFLEX) 2 MG tablet, Take 1 tablet by mouth every evening, Disp: 30 tablet, Rfl: 0    sertraline (ZOLOFT) 50 MG tablet, TAKE 1 TABLET BY MOUTH ONE TIME A DAY , Disp: 30 tablet, Rfl: 5    Multiple Vitamins-Minerals (PRESERVISION AREDS PO), Take by mouth, Disp: , Rfl:     loratadine (CLARITIN) 10 MG tablet, Take 1 tablet by mouth daily (Patient taking differently: Take 10 mg by mouth daily PRN), Disp: 30 tablet, Rfl: 1    finasteride (PROSCAR) 5 MG tablet, Take 5 mg by mouth daily, Disp: , Rfl:     tamsulosin (FLOMAX) 0.4 MG capsule, Take 0.4 mg by mouth daily, Disp: , Rfl:     ramipril (ALTACE) 2.5 MG capsule, Take 2.5 mg by mouth daily, Disp: , Rfl:     acetaminophen (TYLENOL) 325 MG tablet, Take 650 mg by mouth every 6 hours as needed for Pain, Disp: , Rfl:     aspirin 81 MG tablet, Take 81 mg by mouth daily. , Disp: , Rfl:     metoprolol (TOPROL-XL) 25 MG XL tablet, Take 25 mg by mouth 2 times daily , Disp: , Rfl:     clopidogrel (PLAVIX) 75 MG tablet, Take 75 mg by mouth daily. , Disp: , Rfl:     simvastatin (ZOCOR) 20 MG tablet, Take 20 mg by mouth nightly., Disp: , Rfl:     Multiple Vitamins-Minerals (THERAPEUTIC MULTIVITAMIN-MINERALS) tablet, Take 1 tablet by mouth daily. , Disp: , Rfl:     fluticasone (FLONASE) 50 MCG/ACT nasal spray, 2 sprays by Nasal route daily. , Disp: 1 Bottle, Rfl: 0  Allergies:  Isosorbide nitrate; Nitroglycerin; and Meropenem  Social History:    reports that he quit smoking about 5 years ago. He has never used smokeless tobacco. He reports current alcohol use. He reports that he does not use drugs. Family History:   Family History   Problem Relation Age of Onset    Heart Disease Mother     Pacemaker Mother     Cancer Neg Hx     Diabetes Neg Hx     Stroke Neg Hx        REVIEW OF SYSTEMS:   CONSTITUTIONAL: Denies unexplained weight loss, fevers, chills or fatigue  NEUROLOGICAL: Denies unsteady gait or progressive weakness  MUSCULOSKELETAL: Denies joint swelling or redness  GI: Denies nausea, vomiting, diarrhea   : Denies bowel or bladder issues       PHYSICAL EXAM:    Vitals: Temperature 97.3 °F (36.3 °C), resp. rate 14, height 6' 0.01\" (1.829 m), weight 196 lb 6.9 oz (89.1 kg). GENERAL EXAM:  · General Apparence: Patient is adequately groomed with no evidence of malnutrition. · Psychiatric: Orientation: The patient is oriented to time, place and person. The patient's mood and affect are appropriate   · Vascular: Examination reveals no swelling and palpation reveals no tenderness in upper or lower extremities. Good capillary refill.    · The lymphatic examination of the neck, axillae and groin reveals all areas to be without enlargement or induration  · Sensation is intact without deficit in the upper and lower extremities to light touch and pinprick  · Coordination of the upper and lower extremities are normal.  · RIGHT UPPER EXTREMITY:  Inspection/examination of the right upper extremity does not show any tenderness, deformity or injury. Range of motion is unremarkable and pain-free. There is no gross instability. There are no rashes, ulcerations or lesions. Strength and tone are normal. No atrophy or abnormal movements are noted. · LEFT UPPER EXTREMITY: Inspection/examination of the left upper extremity does not show any tenderness, deformity or injury. Range of motion is unremarkable and pain-free. There is no gross instability. There are no rashes, ulcerations or lesions. Strength and tone are normal. No atrophy or abnormal movements are noted. LUMBAR/SACRAL EXAMINATION:  · Inspection: Local inspection shows no step-off or bruising. Lumbar alignment is normal. No instability is noted. · Palpation:   No evidence of tenderness at the midline. Lumbar paraspinal tenderness: Mild L4/5 and L5/S1 tenderness  Bursal tenderness No tenderness bilaterally  There is no paraspinal spasm. · Range of Motion: very limited due to pain  · Strength:   Strength testing is 4/5 left hip flexion and knee flexion, 5/5 in all muscle groups tested. · Special Tests:   Straight leg raise positive left greater than right and crossed SLR negative. Celso's testing is negative bilaterally. FADIR's testing is negative bilaterally. Bowstring test negative. Slump test negative. · Skin: There are no rashes, ulcerations or lesions. · Reflexes: Reflexes are diminished left lower extremity compared to right, 1+ at the patellar and ankle tendons. Clonus absent bilaterally at the feet. · Gait & station: uses a Rolator to ambulate and no ataxia  · Additional Examinations:  · RIGHT LOWER EXTREMITY: Inspection/examination of the right lower extremity does not show any tenderness, deformity or injury. Range of motion is normal and pain-free. There is no gross instability. There are no rashes, ulcerations or lesions.  Strength and tone are normal. No atrophy or abnormal movements are noted. · LEFT LOWER EXTREMITY:  Inspection/examination of the left lower extremity does not show any tenderness, deformity or injury. Range of motion is normal and pain-free. There is no gross instability. There are no rashes, ulcerations or lesions. Strength and tone are normal. No atrophy or abnormal movements are noted. Diagnostic Testing:    No new diagnostics  Results for orders placed or performed in visit on 09/14/20   CBC Auto Differential   Result Value Ref Range    WBC 9.1 4.0 - 11.0 K/uL    RBC 3.81 (L) 4.20 - 5.90 M/uL    Hemoglobin 13.3 (L) 13.5 - 17.5 g/dL    Hematocrit 39.9 (L) 40.5 - 52.5 %    .6 (H) 80.0 - 100.0 fL    MCH 34.9 (H) 26.0 - 34.0 pg    MCHC 33.4 31.0 - 36.0 g/dL    RDW 13.9 12.4 - 15.4 %    Platelets 134 150 - 809 K/uL    MPV 7.5 5.0 - 10.5 fL    Neutrophils % 56.2 %    Lymphocytes % 22.0 %    Monocytes % 15.2 %    Eosinophils % 5.8 %    Basophils % 0.8 %    Neutrophils Absolute 5.1 1.7 - 7.7 K/uL    Lymphocytes Absolute 2.0 1.0 - 5.1 K/uL    Monocytes Absolute 1.4 (H) 0.0 - 1.3 K/uL    Eosinophils Absolute 0.5 0.0 - 0.6 K/uL    Basophils Absolute 0.1 0.0 - 0.2 K/uL     Impression:       1. Lumbar radiculopathy    2. Spondylosis without myelopathy or radiculopathy, lumbar region    3. DDD (degenerative disc disease), lumbar        Plan:  Clinical Course: Above diagnoses are worsening    I discussed the diagnosis and the treatment options with Judah Mena today. In Summary:  The various treatment options were outlined and discussed with Judah Mena including:  Conservative care options: physical therapy, ice, medications, bracing, and activity modification. The indications for therapeutic injections. The indications for additional imaging/laboratory studies. The indications for (possible future) interventions.      After considering the various options discussed, Judah Mena elected to pursue a course of treatment that includes the followin. Medications: OARRS reviewed and appropriate. Low risk on ORT. 4 A's reviewed. Begin  regimen of Tramadol 50 mg BID #14. Oral swab testing completed today in the office. Informed verbal consent was obtained. Goals of the current treatment regimen include: improvement in pain, improvement in overall in physical performance, ability to perform daily activities, work or disability, emotional distress, health care utilization and decreased medication consumption. Progress will be monitored towards achieving/maintaining therapeutic goals:    1. Improving perceived interference of pain with ADL's, ability to perform HEP, continue to improve in overall flexibility, ROM, strength and endurance, ability to do household activities indoor and/or outdoor, work and social/leisure activities. Improve psychosocial and physical functioning. 2. Improving sleep to 6-7 hours a night. Improve mood/ anxiety and depression symptoms    3. Reduction of reliance on opioid analgesia/more appropriate opioid use. Utilization of adjuvant medications as appropriate. Risks and benefits of the medications and alternative treatments have been discussed with the patient. The patient was advised against drinking alcohol with the opioid pain medicines, advised against driving or handling machinery when starting or adjusting the dose of medicines, feeling groggy or drowsy, or if having any cognitive issues related to the current medications. The patient is fully aware of the risk of overdose and death, if medicines are misused and not taken as prescribed. If the patient develops new symptoms or if the symptoms worsen, the patient was told to call the office. No flowsheet data found. 2. PT:  Patient will attend scheduled physical therapy visit today.  I discussed the patient's case with his physical therapist and am in agreement that patient is in need of further diagnostic testing due to worsening symptoms and were made to ensure that this office note is accurate.

## 2020-10-14 ENCOUNTER — TELEPHONE (OUTPATIENT)
Dept: ORTHOPEDIC SURGERY | Age: 85
End: 2020-10-14

## 2020-10-14 NOTE — TELEPHONE ENCOUNTER
S/W PATIENT assured him the office will contact him as soon as his insurance gets back to us about the prior authorization.

## 2020-10-16 ENCOUNTER — TELEPHONE (OUTPATIENT)
Dept: ORTHOPEDIC SURGERY | Age: 85
End: 2020-10-16

## 2020-10-16 NOTE — TELEPHONE ENCOUNTER
S/W PATIENT reiterated that his CT is not approved. Assured that we will contact him once it has been approved. Patient voiced understanding of the conversation and will contact the office with further questions or concerns.

## 2020-10-19 ENCOUNTER — TELEPHONE (OUTPATIENT)
Dept: ORTHOPEDIC SURGERY | Age: 85
End: 2020-10-19

## 2020-10-20 ENCOUNTER — TELEPHONE (OUTPATIENT)
Dept: ORTHOPEDIC SURGERY | Age: 85
End: 2020-10-20

## 2020-10-20 NOTE — TELEPHONE ENCOUNTER
Message has been forwarded to [address] [place] [address] for disability ppw.  Crista Butt is calling patient to schedule him for an OV for a pain rx request.

## 2020-10-20 NOTE — TELEPHONE ENCOUNTER
Please advise if patient is to be off work has restrictions since his first visit on 9/2/2020. His most recent OV was 10/12/2020. The patient has not requested any work notes to date.

## 2020-10-20 NOTE — TELEPHONE ENCOUNTER
The patient has been off work for the last 2 weeks due to back issues. He is running out of PTO and his employer told him he needs short term disability now. I need to know what is approved to fill out any paperwork. Thank you.

## 2020-10-22 ENCOUNTER — OFFICE VISIT (OUTPATIENT)
Dept: ORTHOPEDIC SURGERY | Age: 85
End: 2020-10-22
Payer: MEDICARE

## 2020-10-22 ENCOUNTER — TELEPHONE (OUTPATIENT)
Dept: ORTHOPEDIC SURGERY | Age: 85
End: 2020-10-22

## 2020-10-22 VITALS — WEIGHT: 196 LBS | RESPIRATION RATE: 12 BRPM | BODY MASS INDEX: 26.55 KG/M2 | TEMPERATURE: 98.9 F | HEIGHT: 72 IN

## 2020-10-22 PROCEDURE — 99214 OFFICE O/P EST MOD 30 MIN: CPT | Performed by: PHYSICIAN ASSISTANT

## 2020-10-22 RX ORDER — HYDROCODONE BITARTRATE AND ACETAMINOPHEN 5; 325 MG/1; MG/1
1 TABLET ORAL 2 TIMES DAILY
Qty: 14 TABLET | Refills: 0 | Status: SHIPPED | OUTPATIENT
Start: 2020-10-22 | End: 2020-10-29

## 2020-10-22 NOTE — TELEPHONE ENCOUNTER
HE'S IN A LOT OF PAIN, CAN SOMETHING BE PRESCRIBED FOR HIM AND HE IS WONDERING ABOUT HIS CT SCAN. PATIENT WANTS TO KNOW IF HIS LONG TERM DISABILITY PAPERWORK HAS BEEN RECEIVED. STATES BRIAN NEEDS TO DO SOMETHING BEFORE IT GOES TO MEDICAL RECORDS.

## 2020-10-22 NOTE — TELEPHONE ENCOUNTER
S/W PATIENT explained that CT is still pending approval unfortunately and reiterated that it may take longer since one was recently denied. Re-assured patient office will contact him as soon as we hear something. Explained his paperwork was just dropped off by his daughter yesterday; therefore, it is currently being completed. Reiterated office policy regarding pain medication request. Patient wishes to schedule appointment and was transferred to do so.

## 2020-10-22 NOTE — PROGRESS NOTES
Follow up: Teddi Libman  1935  [id number]         Chief Complaint   Patient presents with    Lower Back Pain     F/u LSP;  CT LSP still pending    Medication Refill     Tramadol 50mg 1PO BID #14. Last dose: 10/20/20         HISTORY OF PRESENT ILLNESS:  Mr. Clarita Herrera is a 82 y.o. male returns for a follow up visit for multiple medical problems. His current presenting problems are   1. Lumbar radiculopathy    2. Spondylosis without myelopathy or radiculopathy, lumbar region    3. DDD (degenerative disc disease), lumbar    . As per information/history obtained from the PADT(patient assessment and documentation tool) - He complains of pain in the lower back with radiation to the upper leg Left and lower leg Bilateral He rates the pain 8/10 and describes it as dull, aching. Pain is made worse by: movement, sitting. He denies side effects from the current pain regimen. Patient reports that since the last follow up visit the physical functioning is worse, family/social relationships are worse, mood is worse and sleep patterns are worse, and that the overall functioning is worse. Patient denies neurological bowel or bladder. The patient presents today in follow-up with lower back and radiating left leg pain. The patient describes that he is awaiting a CT scan to be approved by the insurance company so that we can have this completed for the lumbar spine. The patient is wanting to discuss increase in pain medication as his pain is worsening specifically with moving and sitting in the wrong position. The patient describes that using heating pad and adjusting will help with his pain. He can only sit for half an hour, stand and walk for only 10 minutes without a considerable amount of pain. The patient does ambulate today with a Rollator walker. The patient describes that walking for a few short minutes will make him have to sit down immediately.       Associated signs and symptoms:   Neurogenic bowel Disp: , Rfl:     aspirin 81 MG tablet, Take 81 mg by mouth daily. , Disp: , Rfl:     metoprolol (TOPROL-XL) 25 MG XL tablet, Take 25 mg by mouth 2 times daily , Disp: , Rfl:     clopidogrel (PLAVIX) 75 MG tablet, Take 75 mg by mouth daily. , Disp: , Rfl:     simvastatin (ZOCOR) 20 MG tablet, Take 20 mg by mouth nightly., Disp: , Rfl:     Multiple Vitamins-Minerals (THERAPEUTIC MULTIVITAMIN-MINERALS) tablet, Take 1 tablet by mouth daily. , Disp: , Rfl:     fluticasone (FLONASE) 50 MCG/ACT nasal spray, 2 sprays by Nasal route daily. , Disp: 1 Bottle, Rfl: 0  Allergies:  Isosorbide nitrate; Nitroglycerin; and Meropenem  Social History:    reports that he quit smoking about 5 years ago. He has never used smokeless tobacco. He reports current alcohol use. He reports that he does not use drugs. Family History:   Family History   Problem Relation Age of Onset    Heart Disease Mother     Pacemaker Mother     Cancer Neg Hx     Diabetes Neg Hx     Stroke Neg Hx        REVIEW OF SYSTEMS:   CONSTITUTIONAL: Denies unexplained weight loss, fevers, chills or fatigue  NEUROLOGICAL: + unsteady gait or progressive weakness  MUSCULOSKELETAL: Denies joint swelling or redness  GI: Denies nausea, vomiting, diarrhea   : Denies bowel or bladder issues       PHYSICAL EXAM:    Vitals: Temperature 98.9 °F (37.2 °C), resp. rate 12, height 6' 0.01\" (1.829 m), weight 196 lb (88.9 kg). GENERAL EXAM:  · General Apparence: Patient is adequately groomed with no evidence of malnutrition. · Psychiatric: Orientation: The patient is oriented to time, place and person. The patient's mood and affect are appropriate   · Vascular: Examination reveals no swelling and palpation reveals no tenderness in upper or lower extremities. Good capillary refill.    · The lymphatic examination of the neck, axillae and groin reveals all areas to be without enlargement or induration  · Sensation is intact without deficit in the upper and lower extremities to light touch. · Coordination of the upper and lower extremities are normal.  · RIGHT UPPER EXTREMITY:  Inspection/examination of the right upper extremity does not show any tenderness, deformity or injury. Range of motion is unremarkable and pain-free. There is no gross instability. There are no rashes, ulcerations or lesions. Strength and tone are normal. No atrophy or abnormal movements are noted. · LEFT UPPER EXTREMITY: Inspection/examination of the left upper extremity does not show any tenderness, deformity or injury. Range of motion is unremarkable and pain-free. There is no gross instability. There are no rashes, ulcerations or lesions. Strength and tone are normal. No atrophy or abnormal movements are noted. LUMBAR/SACRAL EXAMINATION:  · Inspection: Local inspection shows no step-off or bruising. Lumbar alignment is normal. No instability is noted. · Palpation:   No evidence of tenderness at the midline. Lumbar paraspinal tenderness: Mild L4/5 and L5/S1 tenderness  Bursal tenderness No tenderness bilaterally  There is no paraspinal spasm. · Range of Motion: limited by 50% in all planes due to pain specifically with extension and side bending to the left. · Strength:   Strength testing is 5/5 in all muscle groups tested. · Special Tests:   Straight leg raise and crossed SLR negative. Celso's testing is negative bilaterally. FADIR's testing is negative bilaterally. Bowstring test negative. Slump test negative. · Skin: There are no rashes, ulcerations or lesions. · Reflexes: Reflexes are symmetrically 2+ at the patellar and ankle tendons. Clonus absent bilaterally at the feet. · Gait & station: ambulates with a walker and no ataxia  · Additional Examinations:  · RIGHT LOWER EXTREMITY: Inspection/examination of the right lower extremity does not show any tenderness, deformity or injury. Range of motion is normal and pain-free. There is no gross instability.   There are no rashes, ulcerations or lesions. Strength and tone are normal. No atrophy or abnormal movements are noted. · LEFT LOWER EXTREMITY:  Inspection/examination of the left lower extremity does not show any tenderness, deformity or injury. Range of motion is normal and pain-free. There is no gross instability. There are no rashes, ulcerations or lesions. Strength and tone are normal. No atrophy or abnormal movements are noted. Diagnostic Testing:    No new diagnostics. Results for orders placed or performed in visit on 09/14/20   CBC Auto Differential   Result Value Ref Range    WBC 9.1 4.0 - 11.0 K/uL    RBC 3.81 (L) 4.20 - 5.90 M/uL    Hemoglobin 13.3 (L) 13.5 - 17.5 g/dL    Hematocrit 39.9 (L) 40.5 - 52.5 %    .6 (H) 80.0 - 100.0 fL    MCH 34.9 (H) 26.0 - 34.0 pg    MCHC 33.4 31.0 - 36.0 g/dL    RDW 13.9 12.4 - 15.4 %    Platelets 805 177 - 494 K/uL    MPV 7.5 5.0 - 10.5 fL    Neutrophils % 56.2 %    Lymphocytes % 22.0 %    Monocytes % 15.2 %    Eosinophils % 5.8 %    Basophils % 0.8 %    Neutrophils Absolute 5.1 1.7 - 7.7 K/uL    Lymphocytes Absolute 2.0 1.0 - 5.1 K/uL    Monocytes Absolute 1.4 (H) 0.0 - 1.3 K/uL    Eosinophils Absolute 0.5 0.0 - 0.6 K/uL    Basophils Absolute 0.1 0.0 - 0.2 K/uL     Impression:       1. Lumbar radiculopathy    2. Spondylosis without myelopathy or radiculopathy, lumbar region    3. DDD (degenerative disc disease), lumbar        Plan:  Clinical Course: Above diagnoses are worsening    I discussed the diagnosis and the treatment options with Devora Tadeo today. In Summary:  The various treatment options were outlined and discussed with Devora Tadeo including:  Conservative care options: physical therapy, ice, medications, bracing, and activity modification. The indications for therapeutic injections. The indications for additional imaging/laboratory studies. The indications for (possible future) interventions.      After considering the various options discussed, Devora Tadeo elected to pursue a course of treatment that includes the followin. Medications:  OARRS reviewed and appropriate. Low risk on ORT. Norco 5-325 mg 1 po BID #14. Informed verbal consent was obtained. Goals of the current treatment regimen include: improvement in pain, improvement in overall in physical performance, ability to perform daily activities, work or disability, emotional distress, health care utilization and decreased medication consumption. Progress will be monitored towards achieving/maintaining therapeutic goals:    1. Improving perceived interference of pain with ADL's, ability to perform HEP, continue to improve in overall flexibility, ROM, strength and endurance, ability to do household activities indoor and/or outdoor, work and social/leisure activities. Improve psychosocial and physical functioning. 2. Improving sleep to 6-7 hours a night. Improve mood/ anxiety and depression symptoms    3. Reduction of reliance on opioid analgesia/more appropriate opioid use. Utilization of adjuvant medications as appropriate. Risks and benefits of the medications and alternative treatments have been discussed with the patient. The patient was advised against drinking alcohol with the opioid pain medicines, advised against driving or handling machinery when starting or adjusting the dose of medicines, feeling groggy or drowsy, or if having any cognitive issues related to the current medications. The patient is fully aware of the risk of overdose and death, if medicines are misused and not taken as prescribed. If the patient develops new symptoms or if the symptoms worsen, the patient was told to call the office. No flowsheet data found. 2. PT:  Encouraged to continue with Home exercise program.     3. Further studies: No further studies. Awaiting approval of the CT Scan of the Lumbar spine. 4. Interventional:  No further interventions at this time, awaiting the CT Scan of the Lumbar.     5. Follow up:  1-2 weeks      Umang Boston was instructed to call the office if his symptoms worsen or if new symptoms appear prior to the next scheduled visit. He is specifically instructed to contact the office between now & his scheduled appointment if he has concerns related to his condition or if he needs assistance in scheduling the above tests. He is welcome to call for an appointment sooner if he has any additional concerns or questions. SULMA Whyte, PA-C  Board Certified by the [employer] on Certification of Physician Assistants  Rosa Erickson 60  Partner of [facility] ([facility])         This dictation was performed with a verbal recognition program [facility]) and it was checked for errors. It is possible that there are still dictated errors within this office note. If so, please bring any errors to my attention for an addendum. All efforts were made to ensure that this office note is accurate.

## 2020-10-23 ENCOUNTER — TELEPHONE (OUTPATIENT)
Dept: ORTHOPEDIC SURGERY | Age: 85
End: 2020-10-23

## 2020-10-28 ENCOUNTER — TELEPHONE (OUTPATIENT)
Dept: ORTHOPEDIC SURGERY | Age: 85
End: 2020-10-28

## 2020-10-28 NOTE — TELEPHONE ENCOUNTER
MARIAM WOULD LIKE TO SPEAK TO [address] [place] [address] REGARDING AN AUTH FOR A CAT SCAN. PLEASE CALL HER [phone].

## 2020-10-29 NOTE — TELEPHONE ENCOUNTER
S/W MARIAM a lengthy discussion was had regarding the pending determination of the CT scan. Explained the process at length and assured Zenia Sodus that we are doing everything in our power along with talking to multiple peer reviewers trying to get the CT approved. Assured her that even if the CT is denied, we will complete an appeal. She voiced understanding of the conversation.

## 2020-10-30 ENCOUNTER — TELEPHONE (OUTPATIENT)
Dept: ORTHOPEDIC SURGERY | Age: 85
End: 2020-10-30

## 2020-10-30 NOTE — TELEPHONE ENCOUNTER
CALL EITHER NUMBER listed-[phone]. THE PHARMACY HAS NOT RECEIVED THE PATIENT'S PRESCRIPTION.  HE WAS IN TO SEE BRIAN THIS WEEK

## 2020-10-30 NOTE — TELEPHONE ENCOUNTER
S/W PATIENT who was actually calling to request a refill of [place]. Reminded patient that this does require an OV. He was transferred to schedule an OV.

## 2020-10-30 NOTE — TELEPHONE ENCOUNTER
S/W Raoul Parker at . [address] who confirms that last Rx written for patient was received and picked up on 10/22/2020.

## 2020-11-05 ENCOUNTER — TELEPHONE (OUTPATIENT)
Dept: ORTHOPEDIC SURGERY | Age: 85
End: 2020-11-05

## 2020-11-09 ENCOUNTER — TELEPHONE (OUTPATIENT)
Dept: ORTHOPEDIC SURGERY | Age: 85
End: 2020-11-09

## 2020-11-09 NOTE — TELEPHONE ENCOUNTER
General Question     Subject: schedule CT scan   Patient and /or Facility Request: [facility] Number: [phone]

## 2020-11-09 NOTE — TELEPHONE ENCOUNTER
I have spoken with the precert department who states the CT is still pending. However, they are calling the insurance this morning to request another status update. The patient will be contacted once this update information has been received from them.

## 2020-11-10 ENCOUNTER — TELEPHONE (OUTPATIENT)
Dept: ORTHOPEDIC SURGERY | Age: 85
End: 2020-11-10

## 2020-11-10 NOTE — TELEPHONE ENCOUNTER
Medical Facility Question     Facility Name: [facility] Name: [facility]. Number: [phone]    Request or Information: CALLED REGARDING THE DENIAL FOR CT SCAN FOR PATIENT WANTED TO KNOW IS THIS GOING TO BE RESUBMITTED.

## 2020-11-10 NOTE — TELEPHONE ENCOUNTER
S/w patient regarding CT LSP approval and authorization being valid until 05/09/2021. Patient was instructed to call CRISS Hawley to schedule CT LSP, then contact our office for follow up appointment. CT LSP results will not be given over the phone or via [employer]. Patient currently has a follow up appointment schedule for NEEDS FOLLOW UP. He will call to schedule appointment once CT scan has been scheduled at [facility]. Advised to contact the office if needing to reschedule this to accommodate MRI scan. Patient voiced understanding of CT results not being given over the phone.

## 2020-11-10 NOTE — TELEPHONE ENCOUNTER
CT LSP approval received today from [facility] department. Authorization #[id number].

## 2020-11-12 ENCOUNTER — TELEPHONE (OUTPATIENT)
Dept: ORTHOPEDIC SURGERY | Age: 85
End: 2020-11-12

## 2020-11-18 ENCOUNTER — OFFICE VISIT (OUTPATIENT)
Dept: ORTHOPEDIC SURGERY | Age: 85
End: 2020-11-18
Payer: MEDICARE

## 2020-11-18 ENCOUNTER — TELEPHONE (OUTPATIENT)
Dept: ORTHOPEDIC SURGERY | Age: 85
End: 2020-11-18

## 2020-11-18 VITALS — HEIGHT: 72 IN | TEMPERATURE: 96.4 F | WEIGHT: 196 LBS | BODY MASS INDEX: 26.55 KG/M2 | RESPIRATION RATE: 12 BRPM

## 2020-11-18 PROCEDURE — 99214 OFFICE O/P EST MOD 30 MIN: CPT | Performed by: STUDENT IN AN ORGANIZED HEALTH CARE EDUCATION/TRAINING PROGRAM

## 2020-11-18 NOTE — TELEPHONE ENCOUNTER
L/m for approval to hold PLAVIX for 7 days prior to [facility] on 12/7/20. Patient aware of hold date.

## 2020-11-18 NOTE — PROGRESS NOTES
Follow up: Cosme Phalen  1935  [id number]      CHIEF COMPLAINT:    Chief Complaint   Patient presents with    Lower Back Pain     TR CT LSP         HISTORY OF PRESENT ILLNESS:  Mr. Collette Bark is a 82 y.o. male returns for a follow up visit for multiple medical problems. His current presenting problems are   1. Lumbar radiculopathy    2. Spondylosis without myelopathy or radiculopathy, lumbar region    3. DDD (degenerative disc disease), lumbar    . As per information/history obtained from the PADT(patient assessment and documentation tool) - He complains of pain in the lower back with radiation to the knees Left and lower leg Left He rates the pain 7/10 and describes it as aching, stabbing. Pain is made worse by: standing, sitting. He denies side effects from the current pain regimen. Patient reports that since the last follow up visit the physical functioning is worse, family/social relationships are unchanged, mood is unchanged and sleep patterns are unchanged, and that the overall functioning is worse. Patient denies neurological bowel or bladder. Mr. Collette Bark presents for follow up of ongoing low back and left leg pain. The patient recently underwent a CT of the lumbar spine and is here to discuss results. The patient continues to complain of low back pain with radiation down the left leg to the knee and intermittently to the shin. Aggravating factors include standing and sitting in certain positions. Alleviating measures include rest and APAP. The patient continues to work at [employer] however he has been working with limitations and frequent breaks. The patient can sit for 60 minutes, stand for 15 minutes and walk for 15 minutes without a considerable amount of pain.        Associated signs and symptoms:   Neurogenic bowel or bladder symptoms:  no   Perceived weakness:  no   Difficulty walking:  yes              Past Medical History:   Past Medical History:   Diagnosis Date    Aortic valve disease  Arthritis     BPH (benign prostatic hyperplasia)     Coronary artery vasospasm (HCC)     Diverticulosis     H/O squamous cell carcinoma of skin     Hematuria 7/26/2017    HTN (hypertension)     Hyperlipidemia     Mixed hyperlipidemia 1/7/2016    Osteoarthritis of right knee     PSA elevation       Past Surgical History:     Past Surgical History:   Procedure Laterality Date    APPENDECTOMY  1970    CARDIAC PACEMAKER PLACEMENT  12/2016    Jose Armando Velásquez MD    COSMETIC SURGERY  2010    skin cancer - base of nose    HERNIA REPAIR  2011    JOINT REPLACEMENT  7/21/2015    M[facility] Deng [facility]    explor[facility]t for perf diverticulum    PROSTATE SURGERY  2011    biopsy - Kai    TONSILLECTOMY AND ADENOIDECTOMY  as a child     Current Medications:     Current Outpatient Medications:     meloxicam (MOBIC) 15 MG tablet, Take 1 tablet by mouth daily, Disp: 30 tablet, Rfl: 0    tiZANidine (ZANAFLEX) 2 MG tablet, Take 1 tablet by mouth every evening, Disp: 30 tablet, Rfl: 0    sertraline (ZOLOFT) 50 MG tablet, TAKE 1 TABLET BY MOUTH ONE TIME A DAY , Disp: 30 tablet, Rfl: 5    Multiple Vitamins-Minerals (PRESERVISION AREDS PO), Take by mouth, Disp: , Rfl:     loratadine (CLARITIN) 10 MG tablet, Take 1 tablet by mouth daily (Patient taking differently: Take 10 mg by mouth daily PRN), Disp: 30 tablet, Rfl: 1    finasteride (PROSCAR) 5 MG tablet, Take 5 mg by mouth daily, Disp: , Rfl:     tamsulosin (FLOMAX) 0.4 MG capsule, Take 0.4 mg by mouth daily, Disp: , Rfl:     ramipril (ALTACE) 2.5 MG capsule, Take 2.5 mg by mouth daily, Disp: , Rfl:     acetaminophen (TYLENOL) 325 MG tablet, Take 650 mg by mouth every 6 hours as needed for Pain, Disp: , Rfl:     aspirin 81 MG tablet, Take 81 mg by mouth daily. , Disp: , Rfl:     metoprolol (TOPROL-XL) 25 MG XL tablet, Take 25 mg by mouth 2 times daily , Disp: , Rfl:     clopidogrel (PLAVIX) 75 MG tablet, Take 75 mg by mouth daily. , Disp: , Rfl:    simvastatin (ZOCOR) 20 MG tablet, Take 20 mg by mouth nightly., Disp: , Rfl:     Multiple Vitamins-Minerals (THERAPEUTIC MULTIVITAMIN-MINERALS) tablet, Take 1 tablet by mouth daily. , Disp: , Rfl:     fluticasone (FLONASE) 50 MCG/ACT nasal spray, 2 sprays by Nasal route daily. , Disp: 1 Bottle, Rfl: 0  Allergies:  Isosorbide nitrate; Nitroglycerin; and Meropenem  Social History:    reports that he quit smoking about 5 years ago. He has never used smokeless tobacco. He reports current alcohol use. He reports that he does not use drugs. Family History:   Family History   Problem Relation Age of Onset    Heart Disease Mother     Pacemaker Mother     Cancer Neg Hx     Diabetes Neg Hx     Stroke Neg Hx        REVIEW OF SYSTEMS:   CONSTITUTIONAL: Denies unexplained weight loss, fevers, chills or fatigue  NEUROLOGICAL: Denies unsteady gait or progressive weakness  MUSCULOSKELETAL: Denies joint swelling or redness  GI: Denies nausea, vomiting, diarrhea   : Denies bowel or bladder issues       PHYSICAL EXAM:    Vitals: Temperature 96.4 °F (35.8 °C), resp. rate 12, height 6' (1.829 m), weight 196 lb (88.9 kg). GENERAL EXAM:  · General Apparence: Patient is adequately groomed with no evidence of malnutrition. · Psychiatric: Orientation: The patient is oriented to time, place and person. The patient's mood and affect are appropriate   · Vascular: Examination reveals no swelling and palpation reveals no tenderness in upper or lower extremities. Good capillary refill. · The lymphatic examination of the neck, axillae and groin reveals all areas to be without enlargement or induration  · Sensation is intact without deficit in the upper and lower extremities to light touch and pinprick  · Coordination of the upper and lower extremities are normal.  · RIGHT UPPER EXTREMITY:  Inspection/examination of the right upper extremity does not show any tenderness, deformity or injury.  Range of motion is unremarkable and pain-free. There is no gross instability. There are no rashes, ulcerations or lesions. Strength and tone are normal. No atrophy or abnormal movements are noted. · LEFT UPPER EXTREMITY: Inspection/examination of the left upper extremity does not show any tenderness, deformity or injury. Range of motion is unremarkable and pain-free. There is no gross instability. There are no rashes, ulcerations or lesions. Strength and tone are normal. No atrophy or abnormal movements are noted. LUMBAR/SACRAL EXAMINATION:  · Inspection: Local inspection shows no step-off or bruising. Lumbar alignment is normal. No instability is noted. · Palpation:   No evidence of tenderness at the midline. Lumbar paraspinal tenderness: Mild L4/5 and L5/S1 tenderness  Bursal tenderness No tenderness bilaterally  There is no paraspinal spasm. · Range of Motion: limited by 50% in all planes due to pain  · Strength:   Strength testing is 5/5 in all muscle groups tested. · Special Tests:   Straight leg raise positive left and crossed SLR negative. Celso's testing is negative bilaterally. FADIR's testing is negative bilaterally. · Skin: There are no rashes, ulcerations or lesions. · Reflexes: Reflexes are symmetrically 1+ at the patellar and ankle tendons. Clonus absent bilaterally at the feet. · Gait & station: Ambulates with a rolator, antalgic on the left  · Additional Examinations:  · RIGHT LOWER EXTREMITY: Inspection/examination of the right lower extremity does not show any tenderness, deformity or injury. Range of motion is normal and pain-free. There is no gross instability. There are no rashes, ulcerations or lesions. Strength and tone are normal. No atrophy or abnormal movements are noted. · LEFT LOWER EXTREMITY:  Inspection/examination of the left lower extremity does not show any tenderness, deformity or injury. Range of motion is normal and pain-free. There is no gross instability.  There are no rashes, ulcerations or lesions. Strength and tone are normal. No atrophy or abnormal movements are noted. Diagnostic Testing:    CT Lumbar Spine 11/16/2020  L1-2:      Diffuse disc bulge and ligamentum flavum redundancy resulting severe facet    arthropathy moderately narrows the canal.  There is mild bilateral neural foraminal narrowing.         L2-3:      Diffuse disc bulge with superimposed calcified/ossified component centrally in the    setting of ligamentum flavum redundancy from severe facet arthropathy at least moderately    narrows the canal.  There is mild bilateral neural foraminal    narrowing.        L3-4:      Diffuse disc bulge and ligamentum flavum redundancy in the setting of severe facet    arthropathy severely narrows the canal.  Severe left and mild right neural foraminal narrowing. L4-5:      At this level as undergone auto fusion, there is a bony disc osteophyte with severe    ligamentum flavum redundancy from facet arthropathy, at least moderately narrowing the canal.      Moderate bilateral neural foraminal narrowing.     L5-S1:    Diffuse disc bulge causes no significant canal narrowing.  Ligamentum flavum    redundancy from severe facet arthropathy causes severe right and moderate left neural foraminal     narrowing.         Results for orders placed or performed in visit on 09/14/20   CBC Auto Differential   Result Value Ref Range    WBC 9.1 4.0 - 11.0 K/uL    RBC 3.81 (L) 4.20 - 5.90 M/uL    Hemoglobin 13.3 (L) 13.5 - 17.5 g/dL    Hematocrit 39.9 (L) 40.5 - 52.5 %    .6 (H) 80.0 - 100.0 fL    MCH 34.9 (H) 26.0 - 34.0 pg    MCHC 33.4 31.0 - 36.0 g/dL    RDW 13.9 12.4 - 15.4 %    Platelets 759 772 - 290 K/uL    MPV 7.5 5.0 - 10.5 fL    Neutrophils % 56.2 %    Lymphocytes % 22.0 %    Monocytes % 15.2 %    Eosinophils % 5.8 %    Basophils % 0.8 %    Neutrophils Absolute 5.1 1.7 - 7.7 K/uL    Lymphocytes Absolute 2.0 1.0 - 5.1 K/uL    Monocytes Absolute 1.4 (H) 0.0 - 1.3 K/uL    Eosinophils Absolute 0.5 0.0 - 0.6 K/uL    Basophils Absolute 0.1 0.0 - 0.2 K/uL     Impression:       1. Lumbar radiculopathy    2. Spondylosis without myelopathy or radiculopathy, lumbar region    3. DDD (degenerative disc disease), lumbar        Plan:  Clinical Course: Above diagnoses are worsening    I discussed the diagnosis and the treatment options with Yobany Tristan today. In Summary:  The various treatment options were outlined and discussed with Yobany Tristan including:  Conservative care options: physical therapy, ice, medications, bracing, and activity modification. The indications for therapeutic injections. The indications for additional imaging/laboratory studies. The indications for (possible future) interventions. After considering the various options discussed, Yobany Tristan elected to pursue a course of treatment that includes the followin. Medications:  No further recommendations for new medications. 2. PT:  Encouraged to continue with HEP. 3. Further studies:  COVID-19 PCR prior to procedure. 4. Interventional:  We discussed pursuing a left L3 and L4 transforaminal epidural steroid injection to address the pain. Radiologic imaging and symptoms confirm the pain etiology. Risks, benefits and alternatives of interventional options were discussed. These include and are not limited to bleeding, infection, spinal headache, nerve injury and lack of pain relief. The patient verbalized understanding and would like to proceed. The patient will be scheduled accordingly. First Epidural steroid injection for therapeutic purposes    As such, I have confirmed the patient has met the general requirements including failure of conservative management including prescription strength analgesics, adjunctive medications were utilized , therapeutic exercise program, and no underlying addiction or behavioral disorders were identified to the ability of the provider.    Symptoms are impacting their ADLs or iADLs such as walking and transferring and significant pain was noted in the HPI. Imaging studies as noted in the diagnostic imaging section of the consultation were reviewed and correlated with clinical findings. Fluoroscopy is utilized for interventional procedures. The patient presents with radicular pain or claudication type symptoms associated with functional impairment. Review of diagnostic imaging in the diagnostic studies section of the consultation shows nerve root compression and correlates with clinical findings. The patient has failed at least 4 weeks of appropriate conservative management. 5. Follow up:  4-6 weeks      Inell Kashif was instructed to call the office if his symptoms worsen or if new symptoms appear prior to the next scheduled visit. He is specifically instructed to contact the office between now & his scheduled appointment if he has concerns related to his condition or if he needs assistance in scheduling the above tests. He is welcome to call for an appointment sooner if he has any additional concerns or questions. Tao Silva PA-C   Board Certified by the [employer] on Certification of Physician Assistants  [address] [facility]  Partner of [facility] ([facility])             This dictation was performed with a verbal recognition program [facility]) and it was checked for errors. It is possible that there are still dictated errors within this office note. If so, please bring any errors to my attention for an addendum. All efforts were made to ensure that this office note is accurate.

## 2020-11-18 NOTE — LETTER
Please schedule the following with:     Date:   20 @ 11:10   Account: [id number]  Patient: Jeff Thurman    : 1935  Address:  [address] [place] [address]    Phone (H):  [phone] (home)      ----------------------------------------------------------------------------------------------  Diagnosis:     ICD-10-CM    1. Lumbar radiculopathy  M54.16    2. Spondylosis without myelopathy or radiculopathy, lumbar region  M47.816    3. DDD (degenerative disc disease), lumbar  M51.36          Levels:L3 and L4   Side: Left   Procedure: Transforaminal epidural steroid injection  CPT Codes 81400, 57262    ----------------------------------------------------------------------------------------------  Injection # 1  [facility]    Attending Physician       Patricia Leal.  Matthew Limon MD.  ----------------------------------------------------------------------------------------------  Injection Scheduled For:    At:    1st Insurance [facility]   Pre-Cert#    2nd Insurance     Pre-Cert#    Comments: Defibrillator   COVID TEST Needed: yes    · Infection control  · Tested positive for MRSA in past 12 months:  no  · Tested positive for MSSA \"staph infection\" in past 12 months: no  · Tested positive for VRE (Vancomycin Resistant Enterococci) in past 12 months:   no  · Currently on any antibiotics for an infection: no  · Anticoagulants:  · On a blood thinner:  Asa/ yes  Plavix - Needs to stop - Dr. Bernard Trejo [phone]  · Any history of bleeding disorder: no   · Advanced Liver disease: no   · Advanced Renal disease: no   · Glaucoma: no   · Diabetes: no     Sedation:  No  -----------------------------------------------------------------------------------------------  Allergies   Allergen Reactions    Isosorbide Nitrate Other (See Comments)     headache    Nitroglycerin Other (See Comments)     headache    Meropenem Rash

## 2020-11-19 ENCOUNTER — TELEPHONE (OUTPATIENT)
Dept: ORTHOPEDIC SURGERY | Age: 85
End: 2020-11-19

## 2020-11-19 NOTE — TELEPHONE ENCOUNTER
Auth: # [id number]    Date: 11/19/2020 thru 5/18/2021  Type of SX:  Outpatient ALEX  Location: [facility]  CPT: 83196, 90418   DX Code: M54.16, M47.816, M51.36  SX area: Lumbar spine  Insurance: [address] [place] [address]  Medicare A&B    CPT: 77227 72, [id number] [address] [place] [address]

## 2020-11-24 ENCOUNTER — TELEPHONE (OUTPATIENT)
Dept: ORTHOPEDIC SURGERY | Age: 85
End: 2020-11-24

## 2020-11-24 NOTE — TELEPHONE ENCOUNTER
WAITING TO UPDATE THE [employer] FORM AFTER THE 11/18/2020 APPOINTMENT.  WAITING FOR REPLY FROM RUBEN ([address] [facility]) REGARDING WORK STATUS.

## 2020-12-02 ENCOUNTER — OFFICE VISIT (OUTPATIENT)
Dept: PRIMARY CARE CLINIC | Age: 85
End: 2020-12-02
Payer: MEDICARE

## 2020-12-02 PROCEDURE — 99211 OFF/OP EST MAY X REQ PHY/QHP: CPT | Performed by: NURSE PRACTITIONER

## 2020-12-04 LAB — SARS-COV-2, NAA: NOT DETECTED

## 2020-12-07 ENCOUNTER — HOSPITAL ENCOUNTER (OUTPATIENT)
Age: 85
Setting detail: OUTPATIENT SURGERY
Discharge: HOME | End: 2020-12-07
Attending: PHYSICAL MEDICINE & REHABILITATION | Admitting: PHYSICAL MEDICINE & REHABILITATION
Payer: MEDICARE

## 2020-12-07 ENCOUNTER — APPOINTMENT (OUTPATIENT)
Dept: GENERAL RADIOLOGY | Age: 85
End: 2020-12-07
Attending: PHYSICAL MEDICINE & REHABILITATION
Payer: MEDICARE

## 2020-12-07 VITALS
OXYGEN SATURATION: 96 % | BODY MASS INDEX: 26.41 KG/M2 | TEMPERATURE: 97.8 F | HEART RATE: 94 BPM | WEIGHT: 195 LBS | RESPIRATION RATE: 16 BRPM | DIASTOLIC BLOOD PRESSURE: 84 MMHG | HEIGHT: 72 IN | SYSTOLIC BLOOD PRESSURE: 133 MMHG

## 2020-12-07 PROCEDURE — 99152 MOD SED SAME PHYS/QHP 5/>YRS: CPT | Performed by: PHYSICAL MEDICINE & REHABILITATION

## 2020-12-07 PROCEDURE — 2709999900 HC NON-CHARGEABLE SUPPLY: Performed by: PHYSICAL MEDICINE & REHABILITATION

## 2020-12-07 PROCEDURE — 6360000002 HC RX W HCPCS: Performed by: PHYSICAL MEDICINE & REHABILITATION

## 2020-12-07 PROCEDURE — 2500000003 HC RX 250 WO HCPCS: Performed by: PHYSICAL MEDICINE & REHABILITATION

## 2020-12-07 PROCEDURE — 3209999900 FLUORO FOR SURGICAL PROCEDURES

## 2020-12-07 PROCEDURE — 3610000056 HC PAIN LEVEL 4 BASE (NON-OR): Performed by: PHYSICAL MEDICINE & REHABILITATION

## 2020-12-07 PROCEDURE — 3610000057 HC PAIN LEVEL 4 ADDL 15 MIN (NON-OR): Performed by: PHYSICAL MEDICINE & REHABILITATION

## 2020-12-07 RX ORDER — BETAMETHASONE SODIUM PHOSPHATE AND BETAMETHASONE ACETATE 3; 3 MG/ML; MG/ML
INJECTION, SUSPENSION INTRA-ARTICULAR; INTRALESIONAL; INTRAMUSCULAR; SOFT TISSUE
Status: COMPLETED | OUTPATIENT
Start: 2020-12-07 | End: 2020-12-07

## 2020-12-07 RX ORDER — FENTANYL CITRATE 50 UG/ML
INJECTION, SOLUTION INTRAMUSCULAR; INTRAVENOUS
Status: COMPLETED | OUTPATIENT
Start: 2020-12-07 | End: 2020-12-07

## 2020-12-07 RX ORDER — MIDAZOLAM HYDROCHLORIDE 1 MG/ML
INJECTION INTRAMUSCULAR; INTRAVENOUS
Status: COMPLETED | OUTPATIENT
Start: 2020-12-07 | End: 2020-12-07

## 2020-12-07 RX ORDER — BUPIVACAINE HYDROCHLORIDE 5 MG/ML
INJECTION, SOLUTION EPIDURAL; INTRACAUDAL
Status: COMPLETED | OUTPATIENT
Start: 2020-12-07 | End: 2020-12-07

## 2020-12-07 ASSESSMENT — PAIN DESCRIPTION - PAIN TYPE
TYPE: CHRONIC PAIN
TYPE: CHRONIC PAIN

## 2020-12-07 ASSESSMENT — PAIN DESCRIPTION - LOCATION
LOCATION: KNEE
LOCATION: KNEE

## 2020-12-07 ASSESSMENT — PAIN DESCRIPTION - ORIENTATION
ORIENTATION: LEFT
ORIENTATION: LEFT

## 2020-12-07 ASSESSMENT — PAIN DESCRIPTION - DESCRIPTORS
DESCRIPTORS: ACHING
DESCRIPTORS: SHARP;DISCOMFORT;ACHING
DESCRIPTORS: DISCOMFORT

## 2020-12-07 ASSESSMENT — PAIN DESCRIPTION - FREQUENCY
FREQUENCY: CONTINUOUS
FREQUENCY: CONTINUOUS

## 2020-12-07 ASSESSMENT — PAIN - FUNCTIONAL ASSESSMENT: PAIN_FUNCTIONAL_ASSESSMENT: 0-10

## 2020-12-07 ASSESSMENT — PAIN SCALES - GENERAL
PAINLEVEL_OUTOF10: 5
PAINLEVEL_OUTOF10: 5

## 2020-12-07 NOTE — H&P
10 MG tablet Take 1 tablet by mouth daily  Patient taking differently: Take 10 mg by mouth daily PRN 1/8/18  Yes Lucas Casillas MD   finasteride (PROSCAR) 5 MG tablet Take 5 mg by mouth daily   Yes Historical Provider, MD   tamsulosin (FLOMAX) 0.4 MG capsule Take 0.4 mg by mouth daily   Yes Historical Provider, MD   ramipril (ALTACE) 2.5 MG capsule Take 2.5 mg by mouth daily   Yes Historical Provider, MD   acetaminophen (TYLENOL) 325 MG tablet Take 650 mg by mouth every 6 hours as needed for Pain   Yes Historical Provider, MD   aspirin 81 MG tablet Take 81 mg by mouth daily. Yes Historical Provider, MD   metoprolol (TOPROL-XL) 25 MG XL tablet Take 25 mg by mouth 2 times daily    Yes Historical Provider, MD   simvastatin (ZOCOR) 20 MG tablet Take 20 mg by mouth nightly. Yes Historical Provider, MD   Multiple Vitamins-Minerals (THERAPEUTIC MULTIVITAMIN-MINERALS) tablet Take 1 tablet by mouth daily. Yes Historical Provider, MD   clopidogrel (PLAVIX) 75 MG tablet Take 75 mg by mouth daily. Historical Provider, MD   fluticasone (FLONASE) 50 MCG/ACT nasal spray 2 sprays by Nasal route daily. 3/5/15   Lucas Casillas MD     Allergies:  Isosorbide nitrate; Nitroglycerin; and Meropenem  Social History:    reports that he quit smoking about 5 years ago. He has never used smokeless tobacco. He reports current alcohol use. He reports that he does not use drugs. Family History:   Family History   Problem Relation Age of Onset    Heart Disease Mother     Pacemaker Mother     Cancer Neg Hx     Diabetes Neg Hx     Stroke Neg Hx        Vitals: Blood pressure (!) 144/93, pulse 75, temperature 97.8 °F (36.6 °C), temperature source Temporal, resp. rate 16, height 6' (1.829 m), weight 195 lb (88.5 kg), SpO2 95 %. PHYSICAL EXAM:including affected areas  Cardiovascular: Normal rate, regular rhythm, normal heart sounds. Pulmonary/Chest: No wheezes.    Extremities: Moves all extremities equally  Cervical and Lumbar Spine: Painful range of motion, no midline tenderness       Diagnosis:Lumbar radiculopathy  M54.16   M47.816   M51.;36    Plan: Proceed with planned procedure      ASA CLASS:         []   I. Normal, healthy adult           [x]   II.  Mild systemic disease            []   III. Severe systemic disease      Mallampati: Mallampati Class II - (soft palate, fauces & uvula are visible)      Sedation plan:   [x]  Local              []  Minimal                  []  General anesthesia    Patient's condition acceptable for planned procedure/sedation. Post Procedure Plan   Return to same level of care   ______________________     The patient was counseled at length about the risks of alia Covid-19 in the caitlyn-operative and post-operative states including the recovery window of their procedure. The patient was made aware that alia Covid-19 after a surgical procedure may worsen their prognosis for recovering from the virus and lend to a higher morbidity and or mortality risk. The patient was given the options of postponing their procedure. All of the risks, benefits, and alternatives were discussed. The patient does wish to proceed with the procedure. The risks and benefits as well as alternatives to the procedure have been discussed with the patient and or family. The patient and or next of kin understands and agrees to proceed.     Shantell Varghese M.D.

## 2020-12-07 NOTE — PROGRESS NOTES
IV discontinued, catheter intact, and dressing applied. Procedural dressing dry and intact. Bilateral lower extremities equal in strength. Discharge instructions reviewed with patient or responsible adult, signed and copy given. All home medications have been reviewed. All questions answered and patient or responsible adult verbalized understanding.   PAIN LEVEL AT DISCHARGE ___5__

## 2020-12-07 NOTE — OP NOTE
Patient:  Kingsley Alcocer  YOB: 1935  Medical Record #:  [id number]   Place: [address] [place] [address]  Date:  12/7/2020   Physician:  Daniel Briceno MD, DARNELL    Procedure: 1. Transforaminal Lumbar Epidural Steroid Injection -  left L3  CPT 97926          2. Transforaminal Lumbar Epidural Steroid Injection -  left L4  CPT 46895    ALEX #1    Pre-Procedure Diagnosis: Lumbar radiculopathy      Post-Procedure Diagnosis: Same    Sedation: Local with 1% Lidocaine 3 ml and 1 mg of IV Versed and 25 mcg of IV Fentanyl    EBL: None    Complications: None    Procedure Summary:        The patient was brought to the procedure suite and placed in the prone position. The skin overlying the lumbar spine was prepped and draped in the usual sterile fashion. Using fluoroscopic guidance, the left L3 foramen was identified. Through anesthetized skin, a 22 gauge 3.5 inch curved tip spinal needle was advanced into the foramen. Isovue M 300 was instilled showing an epidurogram/nerve root outline pattern without evidence of vascular or intrathecal spread. Following which, 7.5 mg of Celestone mixed with 1 ml of 0.5% Marcaine was instilled. The needle was removed. Using fluoroscopic guidance, the left L4 foramen was identified. Through anesthetized skin, a 22 gauge 3.5 inch curved tip spinal needle was advanced into the foramen. Isovue M 300 was instilled showing an epidurogram/nerve root outline pattern without evidence of vascular or intrathecal spread. Following which, 7.5 mg of Celestone mixed with 1 ml of 0.5% Marcaine was instilled. The needle was removed and band-aids were applied. The patient was transferred to the post-operative area in stable condition.

## 2020-12-18 ENCOUNTER — TELEPHONE (OUTPATIENT)
Dept: ORTHOPEDIC SURGERY | Age: 85
End: 2020-12-18

## 2020-12-21 ENCOUNTER — OFFICE VISIT (OUTPATIENT)
Dept: PRIMARY CARE CLINIC | Age: 85
End: 2020-12-21
Payer: MEDICARE

## 2020-12-21 VITALS
TEMPERATURE: 94.3 F | WEIGHT: 196 LBS | SYSTOLIC BLOOD PRESSURE: 130 MMHG | OXYGEN SATURATION: 95 % | RESPIRATION RATE: 16 BRPM | HEIGHT: 69 IN | HEART RATE: 74 BPM | BODY MASS INDEX: 29.03 KG/M2 | DIASTOLIC BLOOD PRESSURE: 84 MMHG

## 2020-12-21 PROCEDURE — G0439 PPPS, SUBSEQ VISIT: HCPCS | Performed by: INTERNAL MEDICINE

## 2020-12-21 ASSESSMENT — LIFESTYLE VARIABLES: HOW OFTEN DO YOU HAVE A DRINK CONTAINING ALCOHOL: 0

## 2020-12-21 ASSESSMENT — PATIENT HEALTH QUESTIONNAIRE - PHQ9
1. LITTLE INTEREST OR PLEASURE IN DOING THINGS: 1
SUM OF ALL RESPONSES TO PHQ QUESTIONS 1-9: 2
2. FEELING DOWN, DEPRESSED OR HOPELESS: 1
SUM OF ALL RESPONSES TO PHQ9 QUESTIONS 1 & 2: 2
SUM OF ALL RESPONSES TO PHQ QUESTIONS 1-9: 2
SUM OF ALL RESPONSES TO PHQ QUESTIONS 1-9: 2

## 2020-12-21 NOTE — PROGRESS NOTES
Medicare Annual Wellness Visit  Name: Pauly Bird Date: 2020   MRN: [id number] Sex: Male   Age: 82 y.o. Ethnicity: Non-/Non    : 1935 Race: Gricel Lang is here for Medicare AWV    Screenings for behavioral, psychosocial and functional/safety risks, and cognitive dysfunction are all negative except as indicated below. These results, as well as other patient data from the [address] form, are documented in Flowsheets linked to this Encounter. Diabetes Mellitus Type 2:   Current Medications: None, patient is diet controlled  Diet : Patient decreases carbohydrates  Home blood sugar records: patient tests 1 time(s) per week and the last time it was 106  Any episodes of hypoglycemia? no  Eye exam current (within one year): yes per patient  Daily Aspirin? Yes 81mg once daily and Plavix 75mg once daily  Current exercise: no regular exercise     Hypertension:    Current Medications: Ramipril 2.5mg po q day and Metoprolol ER 25mg po bid  Home blood pressure monitoring: No.    Diet: low sodium     Hyperlipidemia:   Current medication: Simvastatin 20mg po qhs  Diet: Patient decreases fat and cholesterol     Depression:  Current medication: Sertraline 50mg po q day  Patient states his depression is fine. Patient denies feeling sad or down    Patient states he is seeing Dr. Gopi Brown for spinal stenosis.        Allergies   Allergen Reactions    Isosorbide Nitrate Other (See Comments)     headache    Nitroglycerin Other (See Comments)     headache    Meropenem Rash         Prior to Visit Medications    Medication Sig Taking?  Authorizing Provider   meloxicam (MOBIC) 15 MG tablet Take 1 tablet by mouth daily Yes ELISHA Baeza   tiZANidine (ZANAFLEX) 2 MG tablet Take 1 tablet by mouth every evening Yes ELISHA Baeza   sertraline (ZOLOFT) 50 MG tablet TAKE 1 TABLET BY MOUTH ONE TIME A DAY  Yes Steven Varner MD Multiple Vitamins-Minerals (PRESERVISION AREDS PO) Take by mouth Yes Historical Provider, MD   loratadine (CLARITIN) 10 MG tablet Take 1 tablet by mouth daily  Patient taking differently: Take 10 mg by mouth daily PRN Yes Keith Magdaleno MD   finasteride (PROSCAR) 5 MG tablet Take 5 mg by mouth daily Yes Historical Provider, MD   tamsulosin (FLOMAX) 0.4 MG capsule Take 0.4 mg by mouth daily Yes Historical Provider, MD   ramipril (ALTACE) 2.5 MG capsule Take 2.5 mg by mouth daily Yes Historical Provider, MD   acetaminophen (TYLENOL) 325 MG tablet Take 650 mg by mouth every 6 hours as needed for Pain Yes Historical Provider, MD   aspirin 81 MG tablet Take 81 mg by mouth daily. Yes Historical Provider, MD   metoprolol (TOPROL-XL) 25 MG XL tablet Take 25 mg by mouth 2 times daily  Yes Historical Provider, MD   clopidogrel (PLAVIX) 75 MG tablet Take 75 mg by mouth daily. Yes Historical Provider, MD   simvastatin (ZOCOR) 20 MG tablet Take 20 mg by mouth nightly. Yes Historical Provider, MD   Multiple Vitamins-Minerals (THERAPEUTIC MULTIVITAMIN-MINERALS) tablet Take 1 tablet by mouth daily. Yes Historical Provider, MD   fluticasone (FLONASE) 50 MCG/ACT nasal spray 2 sprays by Nasal route daily.  Yes Keith Magdaleno MD         Past Medical History:   Diagnosis Date    Aortic valve disease     Arthritis     BPH (benign prostatic hyperplasia)     Coronary artery vasospasm ([facility] [address]     Diverticulosis     H/O squamous cell carcinoma of skin     Hematuria 7/26/2017    HTN (hypertension)     Hyperlipidemia     Mixed hyperlipidemia 1/7/2016    Osteoarthritis of right knee     PSA elevation        Past Surgical History:   Procedure Laterality Date    APPENDECTOMY  1970    CARDIAC PACEMAKER PLACEMENT  12/2016    Vianca Mancilla MD    COSMETIC SURGERY  2010    skin cancer - base of nose    HERNIA REPAIR  2011    JOINT REPLACEMENT  7/21/2015    Jose J Cortes MD   [address]    exploratoryt for perf diverticulum  PAIN MANAGEMENT PROCEDURE Left 12/7/2020    LEFT L3 AND L4  TRANSFORAMINAL EPIDURAL STEROID INJECTION WITH FLUOROSCOPY performed by Rosa Florence MD at [address] [place] [address] School   as a child         Family History   Problem Relation Age of Onset    Heart Disease Mother     Pacemaker Mother     Cancer Neg Hx     Diabetes Neg Hx     Stroke Neg Hx        CareTeam (Including outside providers/suppliers regularly involved in providing care):   Patient Care Team:  Elaine Heath MD as PCP - General (Internal Medicine)  Elaine Heath MD [facility]CP [facility]  Heidi Delgado MD as Consulting Physician (Cardiology)  Smiley Kwan MD as Consulting Physician (Medical Oncology)  kM Haque as Consulting Physician (Orthopedic Surgery)    Wt Readings from Last 3 Encounters:   12/23/20 196 lb (88.9 kg)   12/21/20 196 lb (88.9 kg)   12/07/20 195 lb (88.5 kg)     Vitals:    12/21/20 1415   BP: 130/84   Pulse: 74   Resp: 16   Temp: 94.3 °F (34.6 °C)   SpO2: 95%   Weight: 196 lb (88.9 kg)   Height: 5' 9\" (1.753 m)     Body mass index is 28.94 kg/m². Based upon direct observation of the patient, evaluation of cognition reveals recent and remote memory intact.     General Appearance: alert and oriented to person, place and time, well-developed and well-nourished, in no acute distress  Head: normocephalic and atraumatic  Eyes: pupils equal, round, and reactive to light, extraocular eye movements intact, conjunctivae normal  ENT: tympanic membrane, external ear and ear canal normal bilaterally  Neck: neck supple and non tender without mass, no thyromegaly or thyroid nodules, no cervical lymphadenopathy   Pulmonary/Chest: clear to auscultation bilaterally- no wheezes, rales or rhonchi, normal air movement, no respiratory distress Cardiovascular: normal rate, regular rhythm, normal S1 and S2, intact distal pulses, no carotid bruits and systolic murmur present- 2/6   Abdomen: soft, non-tender, non-distended, normal bowel sounds, no masses or organomegaly  Extremities: no edema  Neurologic: no cranial nerve deficit, gait and coordination normal and speech normal    Patient's complete Health Risk Assessment and screening values have been reviewed and are found in Flowsheets. The following problems were reviewed today and where indicated follow up appointments were made and/or referrals ordered. Positive Risk Factor Screenings with Interventions:            General Health and ACP:  General  In general, how would you say your health is?: Fair  In the past 7 days, have you experienced any of the following? New or Increased Pain, New or Increased Fatigue, Loneliness, Social Isolation, Stress or Anger?: (!) New or Increased Pain  Do you get the social and emotional support that you need?: Yes  Do you have a Living Will?: (!) No  Advance Directives     Power of [address] [place] [address] Will ACP-Advance Directive ACP-Power of     Not on File Not on File Not on File Not on File      General Health Risk Interventions:  · Pain issues: Patient states he has back pain due to spinal stenosis.  Patient is seeing Orthopedics Spine  · No Living Will: Advance Care Planning addressed with patient today Patient states his Living will is done and     Health Habits/Nutrition:  Health Habits/Nutrition  Do you exercise for at least 20 minutes 2-3 times per week?: (!) No  Have you lost any weight without trying in the past 3 months?: No  Do you eat fewer than 2 meals per day?: No  Have you seen a dentist within the past year?: Yes  Body mass index: (!) 28.94  Health Habits/Nutrition Interventions:  · Inadequate physical activity:  patient is not ready to increase his/her physical activity level at this time · Nutritional issues:  patient is not ready to address his/her nutritional/weight issues at this time   · Patient states he is not ready to increase his activity due to spinal stenosis    Hearing/Vision:  No exam data present  Hearing/Vision  Do you or your family notice any trouble with your hearing?: (!) Yes  Do you have difficulty driving, watching TV, or doing any of your daily activities because of your eyesight?: No  Have you had an eye exam within the past year?: Yes  Hearing/Vision Interventions:  · Hearing concerns:  patient declines any further evaluation/treatment for hearing issues Patient states he feels like he doesn't hear that bad and doesn't want to do anything right now. Patient states his wife wears hearing aids.      Safety:  Safety  Do you have working smoke detectors?: Yes  Have all throw rugs been removed or fastened?: Yes  Do you have non-slip mats or surfaces in all bathtubs/showers?: (!) No  Do all of your stairways have a railing or banister?: Yes  Are your doorways, halls and stairs free of clutter?: Yes  Do you always fasten your seatbelt when you are in a car?: Yes  Safety Interventions:  · Patient declines any further evaluation/treatment for this issue     Personalized Preventive Plan   Current Health Maintenance Status  Immunization History   Administered Date(s) Administered    Influenza Virus Vaccine 10/15/2014    Influenza, High Dose (Fluzone 65 yrs and older) 10/15/2012, 10/12/2015, 10/10/2016, 10/20/2017    Influenza, Quadv, adjuvanted, 65 yrs +, IM, PF (Fluad) 09/12/2020    Influenza, Triv, inactivated, subunit, adjuvanted, IM (Fluad 65 yrs and older) 10/01/2018, 09/28/2019    Pneumococcal Conjugate 13-valent ([id number]) 07/05/2016    Pneumococcal Conjugate 7-valent (Prevnar7) 08/07/2014    Pneumococcal Conjugate Vaccine 06/22/2008    Pneumococcal Polysaccharide ([id number]) 06/22/2008, 06/29/2015    Tdap (Boostrix, Adacel) 07/02/2015  Neutrophils Absolute 08/22/2020 8.80*    Lymphocytes Absolute 08/22/2020 3.10     Monocytes Absolute 08/22/2020 1.40*    Eosinophils Absolute 08/22/2020 0.60*    Basophils Absolute 08/22/2020 0.10     Seg Neutrophils 08/22/2020 63     Lymphocytes % 08/22/2020 22     Monocytes 08/22/2020 10     Eosinophils % 08/22/2020 4     Basophils Absolute 08/22/2020 1     Sodium 08/22/2020 139     Potassium 08/22/2020 4.2     Chloride 08/22/2020 105     CO2 08/22/2020 28     Glucose 08/22/2020 100*    BUN 08/22/2020 26     CREATININE 08/22/2020 1.12     Calcium 08/22/2020 9.0     Total Protein 08/22/2020 6.8     Alb 08/22/2020 4.0     Total Bilirubin 08/22/2020 0.4     Alkaline Phosphatase 08/22/2020 53     AST 08/22/2020 14     ALT 08/22/2020 15     GFR Non- 08/22/2020 59*    GFR  08/22/2020 68     Anion Gap 08/22/2020 6     Cholesterol, Total 08/22/2020 130     Triglycerides 08/22/2020 224*    HDL 08/22/2020 30*    LDL Calculated 08/22/2020 55     Cholesterol non HDL 08/22/2020 100     Hemoglobin A1C 08/22/2020 6.5*    eAG 08/22/2020 140    Orders Only on 08/14/2020   Component Date Value    WBC 08/14/2020 11.6*    RBC 08/14/2020 3.96*    Hemoglobin 08/14/2020 13.6     Hematocrit 08/14/2020 41.0     MCV 08/14/2020 103.8*    MCH 08/14/2020 34.3*    MCHC 08/14/2020 33.1     RDW 08/14/2020 13.0     Platelets 20/88/7670 277     MPV 08/14/2020 7.7     Neutrophils % 08/14/2020 84.0     Lymphocytes % 08/14/2020 3.6     Monocytes % 08/14/2020 11.8     Eosinophils % 08/14/2020 0.2     Basophils % 08/14/2020 0.4     Neutrophils Absolute 08/14/2020 9.8*    Lymphocytes Absolute 08/14/2020 0.4*    Monocytes Absolute 08/14/2020 1.4*    Eosinophils Absolute 08/14/2020 0.0     Basophils Absolute 08/14/2020 0.0     Sodium 08/14/2020 137     Potassium 08/14/2020 4.3     Chloride 08/14/2020 100     CO2 08/14/2020 23     Anion Gap 08/14/2020 14  Glucose 08/14/2020 128*    BUN 08/14/2020 20     CREATININE 08/14/2020 1.2     GFR Non- 08/14/2020 58*    GFR  08/14/2020 >60     Calcium 08/14/2020 9.1          Diagnoses and all orders for this visit:    1. Routine general medical examination at a health care facility  -Medicare AWV done    2. Type 2 diabetes mellitus without complication, without long-term current use of insulin (HCC)  - controlled with diet  -Limit carbohydrates to 45 grams with meals and 15 grams with snacks  -monitor blood sugars  -Regular aerobic exercise as tolerated  - Hemoglobin A1C; Future  - Comprehensive Metabolic Panel; Future    3. Essential hypertension  -Continue same medications  -Low salt diet  -Regular aerobic exercise  -Comprehensive Metabolic Panel; Future    4. Mixed hyperlipidemia  -Continue same medications  -Low fat, low cholesterol diet  -Regular aerobic exercise  - Lipid Panel; Future  - Comprehensive Metabolic Panel; Future    5. Major depression, single episode, in complete remission ([facility] [address]  -stable  -Continue same medications    6. Anemia, unspecified type  - CBC Auto Differential; Future  - Iron and TIBC; Future    7. Elevated MCV  - CBC Auto Differential; Future  - Vitamin B12 & Folate; Future      Return in 6 months (on 6/21/2021) for diabetes, hypertension, hyperlipidemia, depression.

## 2020-12-21 NOTE — PATIENT INSTRUCTIONS
Personalized Preventive Plan for King Flavin - 12/21/2020  Medicare offers a range of preventive health benefits. Some of the tests and screenings are paid in full while other may be subject to a deductible, co-insurance, and/or copay. Some of these benefits include a comprehensive review of your medical history including lifestyle, illnesses that may run in your family, and various assessments and screenings as appropriate. After reviewing your medical record and screening and assessments performed today your provider may have ordered immunizations, labs, imaging, and/or referrals for you. A list of these orders (if applicable) as well as your Preventive Care list are included within your After Visit Summary for your review. Other Preventive Recommendations:    · A preventive eye exam performed by an eye specialist is recommended every 1-2 years to screen for glaucoma; cataracts, macular degeneration, and other eye disorders. · A preventive dental visit is recommended every 6 months. · Try to get at least 150 minutes of exercise per week or 10,000 steps per day on a pedometer . · Order or download the FREE \"Exercise & Physical Activity: Your Everyday Guide\" from [employer]. Call 1-165.380.3098 or search [employer] online. · You need 7497-5843 mg of calcium and 4364-1430 IU of vitamin D per day. It is possible to meet your calcium requirement with diet alone, but a vitamin D supplement is usually necessary to meet this goal.  · When exposed to the sun, use a sunscreen that protects against both UVA and UVB radiation with an SPF of 30 or greater. Reapply every 2 to 3 hours or after sweating, drying off with a towel, or swimming. · Always wear a seat belt when traveling in a car. Always wear a helmet when riding a bicycle or motorcycle.

## 2020-12-22 DIAGNOSIS — I10 ESSENTIAL HYPERTENSION: ICD-10-CM

## 2020-12-22 DIAGNOSIS — E11.9 TYPE 2 DIABETES MELLITUS WITHOUT COMPLICATION, WITHOUT LONG-TERM CURRENT USE OF INSULIN (HCC): ICD-10-CM

## 2020-12-22 DIAGNOSIS — E78.2 MIXED HYPERLIPIDEMIA: ICD-10-CM

## 2020-12-22 DIAGNOSIS — D64.9 ANEMIA, UNSPECIFIED TYPE: ICD-10-CM

## 2020-12-22 DIAGNOSIS — R71.8 ELEVATED MCV: ICD-10-CM

## 2020-12-22 LAB
A/G RATIO: 1.8 (ref 1.1–2.2)
ALBUMIN SERPL-MCNC: 4.3 G/DL (ref 3.4–5)
ALP BLD-CCNC: 63 U/L (ref 40–129)
ALT SERPL-CCNC: 13 U/L (ref 10–40)
ANION GAP SERPL CALCULATED.3IONS-SCNC: 9 MMOL/L (ref 3–16)
AST SERPL-CCNC: 16 U/L (ref 15–37)
BASOPHILS ABSOLUTE: 0.1 K/UL (ref 0–0.2)
BASOPHILS RELATIVE PERCENT: 0.9 %
BILIRUB SERPL-MCNC: 0.5 MG/DL (ref 0–1)
BUN BLDV-MCNC: 20 MG/DL (ref 7–20)
CALCIUM SERPL-MCNC: 9.8 MG/DL (ref 8.3–10.6)
CHLORIDE BLD-SCNC: 104 MMOL/L (ref 99–110)
CHOLESTEROL, TOTAL: 172 MG/DL (ref 0–199)
CO2: 29 MMOL/L (ref 21–32)
CREAT SERPL-MCNC: 1.1 MG/DL (ref 0.8–1.3)
EOSINOPHILS ABSOLUTE: 0.5 K/UL (ref 0–0.6)
EOSINOPHILS RELATIVE PERCENT: 5.7 %
ESTIMATED AVERAGE GLUCOSE: 131.2 MG/DL
FOLATE: >20 NG/ML (ref 4.78–24.2)
GFR AFRICAN AMERICAN: >60
GFR NON-AFRICAN AMERICAN: >60
GLOBULIN: 2.4 G/DL
GLUCOSE BLD-MCNC: 119 MG/DL (ref 70–99)
HBA1C MFR BLD: 6.2 %
HCT VFR BLD CALC: 43.8 % (ref 40.5–52.5)
HDLC SERPL-MCNC: 44 MG/DL (ref 40–60)
HEMOGLOBIN: 14.5 G/DL (ref 13.5–17.5)
IRON SATURATION: 39 % (ref 20–50)
IRON: 118 UG/DL (ref 59–158)
LDL CHOLESTEROL CALCULATED: 97 MG/DL
LYMPHOCYTES ABSOLUTE: 1.7 K/UL (ref 1–5.1)
LYMPHOCYTES RELATIVE PERCENT: 20.9 %
MCH RBC QN AUTO: 34.7 PG (ref 26–34)
MCHC RBC AUTO-ENTMCNC: 33.2 G/DL (ref 31–36)
MCV RBC AUTO: 104.5 FL (ref 80–100)
MONOCYTES ABSOLUTE: 0.9 K/UL (ref 0–1.3)
MONOCYTES RELATIVE PERCENT: 11 %
NEUTROPHILS ABSOLUTE: 5.1 K/UL (ref 1.7–7.7)
NEUTROPHILS RELATIVE PERCENT: 61.5 %
PDW BLD-RTO: 13 % (ref 12.4–15.4)
PLATELET # BLD: 353 K/UL (ref 135–450)
PMV BLD AUTO: 7.2 FL (ref 5–10.5)
POTASSIUM SERPL-SCNC: 4.8 MMOL/L (ref 3.5–5.1)
RBC # BLD: 4.19 M/UL (ref 4.2–5.9)
SODIUM BLD-SCNC: 142 MMOL/L (ref 136–145)
TOTAL IRON BINDING CAPACITY: 305 UG/DL (ref 260–445)
TOTAL PROTEIN: 6.7 G/DL (ref 6.4–8.2)
TRIGL SERPL-MCNC: 153 MG/DL (ref 0–150)
VITAMIN B-12: 1020 PG/ML (ref 211–911)
VLDLC SERPL CALC-MCNC: 31 MG/DL
WBC # BLD: 8.3 K/UL (ref 4–11)

## 2020-12-23 ENCOUNTER — TELEPHONE (OUTPATIENT)
Dept: ORTHOPEDIC SURGERY | Age: 85
End: 2020-12-23

## 2020-12-23 ENCOUNTER — OFFICE VISIT (OUTPATIENT)
Dept: ORTHOPEDIC SURGERY | Age: 85
End: 2020-12-23
Payer: MEDICARE

## 2020-12-23 VITALS — RESPIRATION RATE: 12 BRPM | TEMPERATURE: 98.1 F | HEIGHT: 69 IN | BODY MASS INDEX: 29.03 KG/M2 | WEIGHT: 196 LBS

## 2020-12-23 PROCEDURE — 99214 OFFICE O/P EST MOD 30 MIN: CPT | Performed by: STUDENT IN AN ORGANIZED HEALTH CARE EDUCATION/TRAINING PROGRAM

## 2020-12-23 NOTE — LETTER
[address] [address]  Phone: [phone]  Fax: [phone]    Ric Leal        December 23, 2020     Patient: Chilo Mayorga   YOB: 1935   Date of Visit: 12/23/2020       To Whom It May Concern: It is my medical opinion that Shahzad Delgado may return to work on 12/24/2020 with the following restrictions: no standing longer than 10-15 minutes. Will need sitting breaks of 10-15 minutes. .    If you have any questions or concerns, please don't hesitate to call.     Sincerely,        ELISHA Leal

## 2020-12-23 NOTE — LETTER
Please schedule the following with:     Date:   2021 @ 1:30   Account: [id number]  Patient: King Rodriguez    : 1935  Address:  [address] [place] [id number]    Phone (H):  [phone] (home)      ----------------------------------------------------------------------------------------------  Diagnosis:     ICD-10-CM    1. Spondylosis without myelopathy or radiculopathy, lumbar region  M47.816    2. DDD (degenerative disc disease), lumbar  M51.36    3. Lumbar radiculopathy  M54.16          Levels: L3, L4, L5 DR  Side: Bilateral  Procedure: Lumbar medial branch blocks  CPT Codes [id number], [id number]  ----------------------------------------------------------------------------------------------  [place]  [facility] [place]ending Physician       Gareth Clemens. Michael Wilburn MD.  ----------------------------------------------------------------------------------------------  Injection Scheduled For:    At:    1st Insurance [facility]   Pre-Cert#  2nd Insurance     Pre-Cert#    Comments:  COVID TEST Needed: yes    · Infection control  ? Tested positive for MRSA in past 12 months:  no  ? Tested positive for MSSA \"staph infection\" in past 12 months: no  ? Tested positive for VRE (Vancomycin Resistant Enterococci) in past 12 months:   no  ? Currently on any antibiotics for an infection: no  · Anticoagulants:  ? On a blood thinner:  Asa/ yes  Plavix - do not stop   ?  Any history of bleeding disorder: no   · Advanced Liver disease: no   · Advanced Renal disease: no   · Glaucoma: no   · Diabetes: no      Sedation:         No  -----------------------------------------------------------------------------------------------  Allergies   Allergen Reactions    Isosorbide Nitrate Other (See Comments)     headache    Nitroglycerin Other (See Comments)     headache    Meropenem Rash

## 2020-12-23 NOTE — PROGRESS NOTES
Follow up: Magali Mann  1935  [id number]      CHIEF COMPLAINT:    Chief Complaint   Patient presents with    Lower Back Pain     F/u Left L3 & Left L4 TF 12/7 (#1)         HISTORY OF PRESENT ILLNESS:  Mr. Dao Jacobson is a 82 y.o. male returns for a follow up visit for multiple medical problems. His current presenting problems are   1. Spondylosis without myelopathy or radiculopathy, lumbar region    2. DDD (degenerative disc disease), lumbar    3. Lumbar radiculopathy    . As per information/history obtained from the PADT(patient assessment and documentation tool) - He complains of pain in the lower back with radiation to the lower back He rates the pain 3/10 and describes it as aching. Pain is made worse by: walking, standing. He denies side effects from the current pain regimen. Patient reports that since the last follow up visit the physical functioning is better, family/social relationships are unchanged, mood is unchanged and sleep patterns are unchanged, and that the overall functioning is better. Patient denies neurological bowel or bladder. Mr. Vel Vee presents for follow-up of ongoing low back pain with radiation down the left leg. The patient recently underwent a left L3 and L4 transforaminal epidural steroid injection on 12/7/2020. The patient reports 90 to 100% relief of his left leg pain however he is still experiencing low back pain. The patient states the low back pain is worse with standing up from a seated position especially if he has been seated for prolonged period of time. He also states the pain is worse with prolonged standing longer than 10 to 15 minutes. He states the pain is worse in the morning as well. Changing positions or \"walking it off\" alleviates his pain. The patient does describe some ongoing left knee pain as well. He states his left leg is weaker than his right leg. This is most likely due to the amount of time he was in pain and not utilizing the left leg. The patient can sit for 2 hours, stand for 20 minutes and walk for 20 minutes without a considerable amount of pain. He would like to return to work at this time however he is requesting restrictions for the length of time that he will be able to stand.     Associated signs and symptoms:   Neurogenic bowel or bladder symptoms:  no   Perceived weakness:  no   Difficulty walking:  no              Past Medical History:   Past Medical History:   Diagnosis Date    Aortic valve disease     Arthritis     BPH (benign prostatic hyperplasia)     Coronary artery vasospasm (HCC)     Diverticulosis     H/O squamous cell carcinoma of skin     Hematuria 7/26/2017    HTN (hypertension)     Hyperlipidemia     Mixed hyperlipidemia 1/7/2016    Osteoarthritis of right knee     PSA elevation       Past Surgical History:     Past Surgical History:   Procedure Laterality Date    APPENDECTOMY  1970    CARDIAC PACEMAKER PLACEMENT  12/2016    [facility]    COSMETIC SURGERY  2010    skin cancer - base of nose    HERNIA REPAIR  2011    JOINT REPLACEMENT  7/21/2015 Radha Funk MD   [address]    exploratoryt for perf diverticulum    PAIN MANAGEMENT PROCEDURE Left 12/7/2020    LEFT L3 AND L4  TRANSFORAMINAL EPIDURAL STEROID INJECTION WITH FLUOROSCOPY performed by Ingrid Guillaume MD at [address]  as a child     Current Medications:     Current Outpatient Medications:     meloxicam (MOBIC) 15 MG tablet, Take 1 tablet by mouth daily, Disp: 30 tablet, Rfl: 0    tiZANidine (ZANAFLEX) 2 MG tablet, Take 1 tablet by mouth every evening, Disp: 30 tablet, Rfl: 0    sertraline (ZOLOFT) 50 MG tablet, TAKE 1 TABLET BY MOUTH ONE TIME A DAY , Disp: 30 tablet, Rfl: 5    Multiple Vitamins-Minerals (PRESERVISION AREDS PO), Take by mouth, Disp: , Rfl:     loratadine (CLARITIN) 10 MG tablet, Take 1 tablet by mouth daily (Patient taking differently: Take 10 mg by mouth daily PRN), Disp: 30 tablet, Rfl: 1    finasteride (PROSCAR) 5 MG tablet, Take 5 mg by mouth daily, Disp: , Rfl:     tamsulosin (FLOMAX) 0.4 MG capsule, Take 0.4 mg by mouth daily, Disp: , Rfl:     ramipril (ALTACE) 2.5 MG capsule, Take 2.5 mg by mouth daily, Disp: , Rfl:     acetaminophen (TYLENOL) 325 MG tablet, Take 650 mg by mouth every 6 hours as needed for Pain, Disp: , Rfl:     aspirin 81 MG tablet, Take 81 mg by mouth daily. , Disp: , Rfl:     metoprolol (TOPROL-XL) 25 MG XL tablet, Take 25 mg by mouth 2 times daily , Disp: , Rfl:     clopidogrel (PLAVIX) 75 MG tablet, Take 75 mg by mouth daily. , Disp: , Rfl:     simvastatin (ZOCOR) 20 MG tablet, Take 20 mg by mouth nightly., Disp: , Rfl:     Multiple Vitamins-Minerals (THERAPEUTIC MULTIVITAMIN-MINERALS) tablet, Take 1 tablet by mouth daily. , Disp: , Rfl:     fluticasone (FLONASE) 50 MCG/ACT nasal spray, 2 sprays by Nasal route daily. , Disp: 1 Bottle, Rfl: 0  Allergies:  Isosorbide nitrate, Nitroglycerin, and Meropenem  Social History: reports that he quit smoking about 5 years ago. He has never used smokeless tobacco. He reports current alcohol use. He reports that he does not use drugs. Family History:   Family History   Problem Relation Age of Onset    Heart Disease Mother     Pacemaker Mother     Cancer Neg Hx     Diabetes Neg Hx     Stroke Neg Hx        REVIEW OF SYSTEMS:   CONSTITUTIONAL: Denies unexplained weight loss, fevers, chills or fatigue  NEUROLOGICAL: Denies unsteady gait or progressive weakness  MUSCULOSKELETAL: Denies joint swelling or redness  GI: Denies nausea, vomiting, diarrhea   : Denies bowel or bladder issues       PHYSICAL EXAM:    Vitals: Temperature 98.1 °F (36.7 °C), resp. rate 12, height 5' 9\" (1.753 m), weight 196 lb (88.9 kg). GENERAL EXAM:  · General Apparence: Patient is adequately groomed with no evidence of malnutrition. · Psychiatric: Orientation: The patient is oriented to time, place and person. The patient's mood and affect are appropriate   · Vascular: Examination reveals no swelling and palpation reveals no tenderness in upper or lower extremities. Good capillary refill. · The lymphatic examination of the neck, axillae and groin reveals all areas to be without enlargement or induration  · Sensation is intact without deficit in the upper and lower extremities to light touch and pinprick  · Coordination of the upper and lower extremities are normal.  · RIGHT UPPER EXTREMITY:  Inspection/examination of the right upper extremity does not show any tenderness, deformity or injury. Range of motion is unremarkable and pain-free. There is no gross instability. There are no rashes, ulcerations or lesions. Strength and tone are normal. No atrophy or abnormal movements are noted. L1-2:      Diffuse disc bulge and ligamentum flavum redundancy resulting severe facet    arthropathy moderately narrows the canal.  There is mild bilateral neural foraminal narrowing.         L2-3:      Diffuse disc bulge with superimposed calcified/ossified component centrally in the    setting of ligamentum flavum redundancy from severe facet arthropathy at least moderately    narrows the canal.  There is mild bilateral neural foraminal    narrowing.        L3-4:      Diffuse disc bulge and ligamentum flavum redundancy in the setting of severe facet    arthropathy severely narrows the canal.  Severe left and mild right neural foraminal narrowing. L4-5:      At this level as undergone auto fusion, there is a bony disc osteophyte with severe    ligamentum flavum redundancy from facet arthropathy, at least moderately narrowing the canal.      Moderate bilateral neural foraminal narrowing.     L5-S1:    Diffuse disc bulge causes no significant canal narrowing.  Ligamentum flavum    redundancy from severe facet arthropathy causes severe right and moderate left neural foraminal     narrowing.         Results for orders placed or performed in visit on 12/22/20   Lipid Panel   Result Value Ref Range    Cholesterol, Total 172 0 - 199 mg/dL    Triglycerides 153 (H) 0 - 150 mg/dL    HDL 44 40 - 60 mg/dL    LDL Calculated 97 <100 mg/dL    VLDL Cholesterol Calculated 31 Not Established mg/dL   Hemoglobin A1C   Result Value Ref Range    Hemoglobin A1C 6.2 See comment %    eAG 131.2 mg/dL   Comprehensive Metabolic Panel   Result Value Ref Range    Sodium 142 136 - 145 mmol/L    Potassium 4.8 3.5 - 5.1 mmol/L    Chloride 104 99 - 110 mmol/L    CO2 29 21 - 32 mmol/L    Anion Gap 9 3 - 16    Glucose 119 (H) 70 - 99 mg/dL    BUN 20 7 - 20 mg/dL    CREATININE 1.1 0.8 - 1.3 mg/dL    GFR Non-African American >60 >60    GFR African American >60 >60    Calcium 9.8 8.3 - 10.6 mg/dL    Total Protein 6.7 6.4 - 8.2 g/dL The patient has moderate to severe pain with functional impairment for at least 3 months, predominant axial pain, not attributable to radiculopathy, physical exam findings consistent with facet joint as the presumed source of pain, absence of non-facet pathology, absence of a prior fusion at the proposed level and lack of improvement following 6 weeks of conservative management. Dual MBBs on 2 separate occasions to confirm the diagnosis will be undertaken. 5. Follow up:  1-2 weeks      Brad Ee was instructed to call the office if his symptoms worsen or if new symptoms appear prior to the next scheduled visit. He is specifically instructed to contact the office between now & his scheduled appointment if he has concerns related to his condition or if he needs assistance in scheduling the above tests. He is welcome to call for an appointment sooner if he has any additional concerns or questions. Jeffrey Santos PA-C   Board Certified by the [employer] on Certification of Physician Assistants  [address] [facility]  Partner of [facility] ([facility])             This dictation was performed with a verbal recognition program [facility]) and it was checked for errors. It is possible that there are still dictated errors within this office note. If so, please bring any errors to my attention for an addendum. All efforts were made to ensure that this office note is accurate.

## 2020-12-30 ENCOUNTER — TELEPHONE (OUTPATIENT)
Dept: ORTHOPEDIC SURGERY | Age: 85
End: 2020-12-30

## 2020-12-30 NOTE — PROGRESS NOTES
PATIENT REACHED PT'S WIFE   YES__X__NO____    PREOP INSTUCTIONS  Patient instructed to get their COVID-19 test done as directed by their doctor (5-7 days prior to procedure)  or patient states will get on __12/31________. Patient was notified that they need to have an appointment,number to call provided. The day the COVID test is done is considered day one. Instructed to self quarantine after test until DOS. There is a one visitor policy at [facility] for all surgeries and endoscopies. Whether the visitor can stay or will be asked to wait in the car will depend on the current policy and if social distancing can be maintained. The policy is subject to change at any time. Please make sure the visitor has a cell phone that is on,charged and able to accept calls, as this may be the way that the staff communicates with them. Pain management is NO VISITOR policyThe patients ride is expected to remain in the car with a cell phone for communication. If the ride is leaving the hospital grounds please make sure they are back in time for pickup. Have the patient inform the staff on arrival what their rides plans are while the patient is in the facility. At the MAIN there is one visitor allowed. Please note that the visitor policy is subject to change. DATE__1/6/21_______ TIME__1330_______ARRIVAL__1230______PLACE_masc___________  NOTHING TO EAT OR DRINK  AFTER MIDNIGHT THE EVENING PRIOR OR AS INSTRUCTED BY YOUR DR. Noguera Labor NEED A RESPONSIBLE ADULT AGE 18 OR OLDER TO DRIVE YOU HOME  PLEASE BRING INSURANCE CARD. PICTURE ID AND COMPLETE LIST OF MEDS  WEAR LOOSE COMFORTABLE CLOTHING  FOLLOW ANY INSTRUCTIONS YOUR DRS OFFICE HAS GIVEN YOU,INCLUDING WHAT MEDICATIONS TO TAKE THE AM OF PROCEDURE AND WHEN AND IF YOU NEED TO STOP ANY BLOOD THINNERS.  IF YOU HAVE QUESTIONS REGARDING THIS CALL THE OFFICE  THE GOAL BLOOD SUGAR THE AM OF PROCEDURE  OR LESS ABOVE THAT THE PROCEDURE MAY BE CANCELLED ANY QUESTIONS CALL YOUR DOCTOR. ALSO,PLEASE READ THE INSTRUCTION PACKET FROM YOUR DR IF YOU RECEIVED ONE.   SPINE INTERVENTION NUMBER [phone]

## 2020-12-31 ENCOUNTER — TELEPHONE (OUTPATIENT)
Dept: ORTHOPEDIC SURGERY | Age: 85
End: 2020-12-31

## 2020-12-31 ENCOUNTER — OFFICE VISIT (OUTPATIENT)
Dept: PRIMARY CARE CLINIC | Age: 85
End: 2020-12-31
Payer: MEDICARE

## 2020-12-31 PROCEDURE — 99211 OFF/OP EST MAY X REQ PHY/QHP: CPT | Performed by: NURSE PRACTITIONER

## 2020-12-31 NOTE — TELEPHONE ENCOUNTER
General Question     Subject: CHANGE SURGERY TIME TO A LATER TIME ON 1/6/21  Patient and /or Facility Request: [facility]  Contact Number: [phone]

## 2021-01-01 LAB — SARS-COV-2: NOT DETECTED

## 2021-01-06 ENCOUNTER — HOSPITAL ENCOUNTER (OUTPATIENT)
Age: 86
Setting detail: OUTPATIENT SURGERY
Discharge: HOME HEALTH | End: 2021-01-06
Attending: PHYSICAL MEDICINE & REHABILITATION | Admitting: PHYSICAL MEDICINE & REHABILITATION
Payer: MEDICARE

## 2021-01-06 ENCOUNTER — APPOINTMENT (OUTPATIENT)
Dept: GENERAL RADIOLOGY | Age: 86
End: 2021-01-06
Attending: PHYSICAL MEDICINE & REHABILITATION
Payer: MEDICARE

## 2021-01-06 VITALS
HEART RATE: 69 BPM | OXYGEN SATURATION: 95 % | DIASTOLIC BLOOD PRESSURE: 67 MMHG | WEIGHT: 198 LBS | RESPIRATION RATE: 16 BRPM | BODY MASS INDEX: 29.24 KG/M2 | SYSTOLIC BLOOD PRESSURE: 154 MMHG | TEMPERATURE: 98.1 F

## 2021-01-06 PROCEDURE — 2500000003 HC RX 250 WO HCPCS: Performed by: PHYSICAL MEDICINE & REHABILITATION

## 2021-01-06 PROCEDURE — 3209999900 FLUORO FOR SURGICAL PROCEDURES

## 2021-01-06 PROCEDURE — 2709999900 HC NON-CHARGEABLE SUPPLY: Performed by: PHYSICAL MEDICINE & REHABILITATION

## 2021-01-06 PROCEDURE — 3610000058 HC PAIN LEVEL 5 BASE (NON-OR): Performed by: PHYSICAL MEDICINE & REHABILITATION

## 2021-01-06 RX ORDER — LIDOCAINE HYDROCHLORIDE 10 MG/ML
INJECTION, SOLUTION EPIDURAL; INFILTRATION; INTRACAUDAL; PERINEURAL
Status: COMPLETED | OUTPATIENT
Start: 2021-01-06 | End: 2021-01-06

## 2021-01-06 RX ORDER — BUPIVACAINE HYDROCHLORIDE 5 MG/ML
INJECTION, SOLUTION EPIDURAL; INTRACAUDAL
Status: COMPLETED | OUTPATIENT
Start: 2021-01-06 | End: 2021-01-06

## 2021-01-06 NOTE — H&P
HISTORY AND PHYSICAL/PRE-SEDATION ASSESSMENT    Patient:  Vivi Sober   :  1935  Medical Record No.:  [id number]   Date:  2021  Physician:  Sriram Mo M.D. Facility: [address] [place] [address]    HISTORY OF PRESENT ILLNESS:                 The patient is a 82 y.o. male whom presents with lower back pain. Review of the imaging and physical exam of the patient confirmed the pre-procedure diagnosis. After a thorough discussion of risks, benefits and alternatives informed consent was obtained. Past Medical History:   Past Medical History:   Diagnosis Date    Aortic valve disease     Arthritis     BPH (benign prostatic hyperplasia)     Coronary artery vasospasm ([address]     Diverticulosis     H/O squamous cell carcinoma of skin     Hematuria 2017    HTN (hypertension)     Hyperlipidemia     Mixed hyperlipidemia 2016    Osteoarthritis of right knee     PSA elevation       Past Surgical History:     Past Surgical History:   Procedure Laterality Date    APPENDECTOMY  1970    CARDIAC PACEMAKER PLACEMENT  2016    Aguila Durbin MD    COSMETIC SURGERY      skin cancer - base of nose    HERNIA REPAIR      JOINT REPLACEMENT  2015    Trisha Smith MD   [address]    exploratoryt for perf diverticulum    PAIN MANAGEMENT PROCEDURE Left 2020    LEFT L3 AND L4  TRANSFORAMINAL EPIDURAL STEROID INJECTION WITH FLUOROSCOPY performed by Sriram Mo MD at [facility]  2011    biopsy - [address]  as a child     Current Medications:   Prior to Admission medications    Medication Sig Start Date End Date Taking?  Authorizing Provider   tiZANidine (ZANAFLEX) 2 MG tablet Take 1 tablet by mouth every evening 10/9/20   ELISHA Marsh   sertraline (ZOLOFT) 50 MG tablet TAKE 1 TABLET BY MOUTH ONE TIME A DAY  9/10/20   Arvind Singleton MD   Multiple Vitamins-Minerals (PRESERVISION AREDS PO) Take by mouth Historical Provider, MD   loratadine (CLARITIN) 10 MG tablet Take 1 tablet by mouth daily  Patient taking differently: Take 10 mg by mouth daily PRN 1/8/18   Sb Gutierrez MD   finasteride (PROSCAR) 5 MG tablet Take 5 mg by mouth daily    Historical Provider, MD   tamsulosin (FLOMAX) 0.4 MG capsule Take 0.4 mg by mouth daily    Historical Provider, MD   ramipril (ALTACE) 2.5 MG capsule Take 2.5 mg by mouth daily    Historical Provider, MD   acetaminophen (TYLENOL) 325 MG tablet Take 650 mg by mouth every 6 hours as needed for Pain    Historical Provider, MD   aspirin 81 MG tablet Take 81 mg by mouth daily. Historical Provider, MD   metoprolol (TOPROL-XL) 25 MG XL tablet Take 25 mg by mouth 2 times daily     Historical Provider, MD   clopidogrel (PLAVIX) 75 MG tablet Take 75 mg by mouth daily. Historical Provider, MD   simvastatin (ZOCOR) 20 MG tablet Take 20 mg by mouth nightly. Historical Provider, MD   Multiple Vitamins-Minerals (THERAPEUTIC MULTIVITAMIN-MINERALS) tablet Take 1 tablet by mouth daily. Historical Provider, MD   fluticasone (FLONASE) 50 MCG/ACT nasal spray 2 sprays by Nasal route daily. 3/5/15   Sb Gutierrez MD     Allergies:  Isosorbide nitrate, Nitroglycerin, and Meropenem  Social History:    reports that he quit smoking about 5 years ago. He has never used smokeless tobacco. He reports current alcohol use. He reports that he does not use drugs. Family History:   Family History   Problem Relation Age of Onset    Heart Disease Mother     Pacemaker Mother     Cancer Neg Hx     Diabetes Neg Hx     Stroke Neg Hx        Vitals: Blood pressure (!) 138/94, pulse 84, temperature 98.1 °F (36.7 °C), temperature source Temporal, resp. rate 16, weight 198 lb (89.8 kg), SpO2 95 %. PHYSICAL EXAM:including affected areas  Cardiovascular: Normal rate, regular rhythm, normal heart sounds. Pulmonary/Chest: No wheezes.    Extremities: Moves all extremities equally  Cervical and Lumbar Spine: Painful range of motion, no midline tenderness       Diagnosis:Lumbar spondylosis without radiculopathy  M47.816   M51.;36   M54.16    Plan: Proceed with planned procedure      ASA CLASS:         []   I. Normal, healthy adult           [x]   II.  Mild systemic disease            []   III. Severe systemic disease      Mallampati: Mallampati Class II - (soft palate, fauces & uvula are visible)      Sedation plan:   [x]  Local              []  Minimal                  []  General anesthesia    Patient's condition acceptable for planned procedure/sedation. Post Procedure Plan   Return to same level of care   ______________________     The patient was counseled at length about the risks of alia Covid-19 in the caitlyn-operative and post-operative states including the recovery window of their procedure. The patient was made aware that laia Covid-19 after a surgical procedure may worsen their prognosis for recovering from the virus and lend to a higher morbidity and or mortality risk. The patient was given the options of postponing their procedure. All of the risks, benefits, and alternatives were discussed. The patient does wish to proceed with the procedure. The risks and benefits as well as alternatives to the procedure have been discussed with the patient and or family. The patient and or next of kin understands and agrees to proceed.     Paige Bray M.D.

## 2021-01-06 NOTE — OP NOTE
Patient:  Saige Gomez  YOB: 1935  Medical Record #:  [id number]   Place:   [address] [place] [address]  Date:  1/6/2021   Physician:  Kelly Simon MD, DARNELL    Procedure: Lumbar Medial Branch Blocks - Bilateral L3, L4 and L5 Dorsal ramus    MBB #1    CPT (79819 Modifier [id number] Modifier 50)      Pre-Procedure Diagnosis: Lumbar spondylosis without radiculopathy      Post-Procedure Diagnosis: Same      Sedation: Local with 1% Lidocaine 5 ml      EBL: None      Complications: None      Procedure Summary:      The patient was seen in the office for complaints of low back pain. Review of the imaging and physical exam of the patient confirmed the pre-procedure diagnosis. After a thorough discussion of risks, benefits and alternatives informed consent was obtained. The patient was brought to the procedure suite and placed in the prone position. The skin overlying the lumbar spine was prepped with chloraprep and draped in the usual sterile fashion. Using fluoroscopic guidance, the right L4, L5 and sacral ala levels were identified. Through anesthetized skin a 22 gauge 3.5 inch curved tip spinal needle was advanced to the juncture of the superior articular process and the transverse process at the right L4 level where the right L3 medial branch resides. .3 ml of Isovue M 300 was instilled showing a nerve root outline pattern, without evidence of vascular spread. A total of 0.5 ml of 0.5 % Marcaine was instilled at the right L-4 level corresponding to the right L3 medial branch. This was repeated for the right L5 and sacral ala levels corresponding to the right L4 medial branch and L5 Dorsal ramus. The identical procedure was repeated on the left side. The needles were removed and a band-aid applied. The patient was transferred to the post-operative area in stable condition.

## 2021-01-11 ENCOUNTER — OFFICE VISIT (OUTPATIENT)
Dept: ORTHOPEDIC SURGERY | Age: 86
End: 2021-01-11
Payer: MEDICARE

## 2021-01-11 VITALS — BODY MASS INDEX: 26.82 KG/M2 | WEIGHT: 198 LBS | TEMPERATURE: 97.1 F | HEIGHT: 72 IN

## 2021-01-11 DIAGNOSIS — M51.36 DDD (DEGENERATIVE DISC DISEASE), LUMBAR: ICD-10-CM

## 2021-01-11 DIAGNOSIS — M47.816 SPONDYLOSIS WITHOUT MYELOPATHY OR RADICULOPATHY, LUMBAR REGION: Primary | ICD-10-CM

## 2021-01-11 DIAGNOSIS — M53.3 SACROILIAC JOINT DYSFUNCTION OF BOTH SIDES: ICD-10-CM

## 2021-01-11 PROCEDURE — 99213 OFFICE O/P EST LOW 20 MIN: CPT | Performed by: STUDENT IN AN ORGANIZED HEALTH CARE EDUCATION/TRAINING PROGRAM

## 2021-01-11 NOTE — PROGRESS NOTES
Follow up: Nicole Castillo  1935  [id number]      CHIEF COMPLAINT:    Chief Complaint   Patient presents with    Follow-up     f/u after lsp mbb #1         HISTORY OF PRESENT ILLNESS:  Mr. Nasim Newman is a 82 y.o. male returns for a follow up visit for multiple medical problems. His current presenting problems are   1. Spondylosis without myelopathy or radiculopathy, lumbar region    2. DDD (degenerative disc disease), lumbar    3. Sacroiliac joint dysfunction of both sides    . As per information/history obtained from the PADT(patient assessment and documentation tool) - He complains of pain in the lower back with radiation to the lower back He rates the pain 5/10 and describes it as aching, throbbing. Pain is made worse by: standing, sitting. He denies side effects from the current pain regimen. Patient reports that since the last follow up visit the physical functioning is unchanged, family/social relationships are unchanged, mood is unchanged and sleep patterns are unchanged, and that the overall functioning is worse. Patient denies neurological bowel or bladder. Mr. Austen Goode presents for follow-up of ongoing low back pain. The patient recent underwent his first set of bilateral L3, L4, L5 dorsal ramus medial branch blocks on 1/6/2021. He reports 80% relief for 6 hours following this procedure. He describes he was able to stand from a seated position with less pain. He also states after a full night's sleep his low back was feeling better. He states the pain returned on 1/7/2021. Patient is also complaining of pain across the buttocks which is worse with sitting for long periods of time. Standing and walking help alleviate his pain. The patient has returned to work at [employer] for 4-hour shifts. He is requesting a work note today but states he may not stand for longer than 20 minutes. The patient can sit for 45 minutes, stand for 20 minutes and walk for 20 minutes without a considerable amount of pain.       Sitting breaks every 20 minutes     Associated signs and symptoms:   Neurogenic bowel or bladder symptoms:  no   Perceived weakness:  no   Difficulty walking:  no              Past Medical History:   Past Medical History:   Diagnosis Date    Aortic valve disease     Arthritis     BPH (benign prostatic hyperplasia)     Coronary artery vasospasm (HCC)     Diverticulosis     H/O squamous cell carcinoma of skin     Hematuria 7/26/2017    HTN (hypertension)     Hyperlipidemia     Mixed hyperlipidemia 1/7/2016    Osteoarthritis of right knee     PSA elevation       Past Surgical History:     Past Surgical History:   Procedure Laterality Date    APPENDECTOMY  1970    CARDIAC PACEMAKER PLACEMENT  12/2016    Patricia Fountain MD    COSMETIC SURGERY  2010    skin cancer - base of nose    HERNIA REPAIR  2011    JOINT REPLACEMENT  7/21/2015    Oziel French MD   [address]     exploratoryt for perf diverticulum    NERVE BLOCK Bilateral 1/6/2021    BILATERAL L3,L4,L5 DORSAL RAMUS MEDIAL BRANCH BLOCKS WITH FLUOROSCOPY performed by Hilda Souza MD at [facility]  PAIN MANAGEMENT PROCEDURE Left 12/7/2020    LEFT L3 AND L4  TRANSFORAMINAL EPIDURAL STEROID INJECTION WITH FLUOROSCOPY performed by Jeri Martinez MD at [address]  as a child     Current Medications:     Current Outpatient Medications:     tiZANidine (ZANAFLEX) 2 MG tablet, Take 1 tablet by mouth every evening, Disp: 30 tablet, Rfl: 0    sertraline (ZOLOFT) 50 MG tablet, TAKE 1 TABLET BY MOUTH ONE TIME A DAY , Disp: 30 tablet, Rfl: 5    Multiple Vitamins-Minerals (PRESERVISION AREDS PO), Take by mouth, Disp: , Rfl:     loratadine (CLARITIN) 10 MG tablet, Take 1 tablet by mouth daily (Patient taking differently: Take 10 mg by mouth daily PRN), Disp: 30 tablet, Rfl: 1    finasteride (PROSCAR) 5 MG tablet, Take 5 mg by mouth daily, Disp: , Rfl:     tamsulosin (FLOMAX) 0.4 MG capsule, Take 0.4 mg by mouth daily, Disp: , Rfl:     ramipril (ALTACE) 2.5 MG capsule, Take 2.5 mg by mouth daily, Disp: , Rfl:     acetaminophen (TYLENOL) 325 MG tablet, Take 650 mg by mouth every 6 hours as needed for Pain, Disp: , Rfl:     aspirin 81 MG tablet, Take 81 mg by mouth daily. , Disp: , Rfl:     metoprolol (TOPROL-XL) 25 MG XL tablet, Take 25 mg by mouth 2 times daily , Disp: , Rfl:     clopidogrel (PLAVIX) 75 MG tablet, Take 75 mg by mouth daily. , Disp: , Rfl:     simvastatin (ZOCOR) 20 MG tablet, Take 20 mg by mouth nightly., Disp: , Rfl:     Multiple Vitamins-Minerals (THERAPEUTIC MULTIVITAMIN-MINERALS) tablet, Take 1 tablet by mouth daily. , Disp: , Rfl:     fluticasone (FLONASE) 50 MCG/ACT nasal spray, 2 sprays by Nasal route daily. , Disp: 1 Bottle, Rfl: 0  Allergies:  Isosorbide nitrate, Nitroglycerin, and Meropenem  Social History:    reports that he quit smoking about 5 years ago. He has never used smokeless tobacco. He reports current alcohol use. He reports that he does not use drugs.   Family History:   Family History Problem Relation Age of Onset    Heart Disease Mother     Pacemaker Mother     Cancer Neg Hx     Diabetes Neg Hx     Stroke Neg Hx        REVIEW OF SYSTEMS:   CONSTITUTIONAL: Denies unexplained weight loss, fevers, chills or fatigue  NEUROLOGICAL: Denies unsteady gait or progressive weakness  MUSCULOSKELETAL: Denies joint swelling or redness  GI: Denies nausea, vomiting, diarrhea   : Denies bowel or bladder issues       PHYSICAL EXAM:    Vitals: Temperature 97.1 °F (36.2 °C), height 6' (1.829 m), weight 198 lb (89.8 kg). GENERAL EXAM:  · General Apparence: Patient is adequately groomed with no evidence of malnutrition. · Psychiatric: Orientation: The patient is oriented to time, place and person. The patient's mood and affect are appropriate   · Vascular: Examination reveals no swelling and palpation reveals no tenderness in upper or lower extremities. Good capillary refill. · The lymphatic examination of the neck, axillae and groin reveals all areas to be without enlargement or induration  · Sensation is intact without deficit in the upper and lower extremities to light touch and pinprick  · Coordination of the upper and lower extremities are normal.  · RIGHT UPPER EXTREMITY:  Inspection/examination of the right upper extremity does not show any tenderness, deformity or injury. Range of motion is unremarkable and pain-free. There is no gross instability. There are no rashes, ulcerations or lesions. Strength and tone are normal. No atrophy or abnormal movements are noted. · LEFT UPPER EXTREMITY: Inspection/examination of the left upper extremity does not show any tenderness, deformity or injury. Range of motion is unremarkable and pain-free. There is no gross instability. There are no rashes, ulcerations or lesions. Strength and tone are normal. No atrophy or abnormal movements are noted.     LUMBAR/SACRAL EXAMINATION: · Inspection: Local inspection shows no step-off or bruising. Lumbar alignment is normal. No instability is noted. · Palpation:   No evidence of tenderness at the midline. Lumbar paraspinal tenderness: Mild L4/5 and L5/S1 tenderness  Bursal tenderness No tenderness bilaterally  There is no paraspinal spasm. · Range of Motion: Pain with extension and bilateral facet loading  · Strength:   Strength testing is 5/5 in all muscle groups tested. · Special Tests:   Straight leg raise and crossed SLR negative. Celso's testing is negative bilaterally. FADIR's testing is negative bilaterally. · Skin: There are no rashes, ulcerations or lesions. · Reflexes: Reflexes are symmetrically 1+ at the patellar and ankle tendons. Clonus absent bilaterally at the feet. · Gait & station: normal, patient ambulates without assistance  · Additional Examinations:  · RIGHT LOWER EXTREMITY: Inspection/examination of the right lower extremity does not show any tenderness, deformity or injury. Range of motion is normal and pain-free. There is no gross instability. There are no rashes, ulcerations or lesions. Strength and tone are normal. No atrophy or abnormal movements are noted. · LEFT LOWER EXTREMITY:  Inspection/examination of the left lower extremity does not show any tenderness, deformity or injury. Range of motion is normal and pain-free. There is no gross instability. There are no rashes, ulcerations or lesions. Strength and tone are normal. No atrophy or abnormal movements are noted.       Diagnostic Testing:    CT Lumbar Spine 11/16/2020  L1-2:      Diffuse disc bulge and ligamentum flavum redundancy resulting severe facet    arthropathy moderately narrows the canal.  There is mild bilateral neural foraminal narrowing.         L2-3:      Diffuse disc bulge with superimposed calcified/ossified component centrally in the    setting of ligamentum flavum redundancy from severe facet arthropathy at least moderately narrows the canal.  There is mild bilateral neural foraminal    narrowing.        L3-4:      Diffuse disc bulge and ligamentum flavum redundancy in the setting of severe facet    arthropathy severely narrows the canal.  Severe left and mild right neural foraminal narrowing. L4-5:      At this level as undergone auto fusion, there is a bony disc osteophyte with severe    ligamentum flavum redundancy from facet arthropathy, at least moderately narrowing the canal.      Moderate bilateral neural foraminal narrowing.     L5-S1:    Diffuse disc bulge causes no significant canal narrowing.  Ligamentum flavum    redundancy from severe facet arthropathy causes severe right and moderate left neural foraminal     narrowing.        Results for orders placed or performed in visit on 20   COVID-19   Result Value Ref Range    SARS-CoV-2 Not Detected Not detected     Impression:       1. Spondylosis without myelopathy or radiculopathy, lumbar region    2. DDD (degenerative disc disease), lumbar    3. Sacroiliac joint dysfunction of both sides        Plan:  Clinical Course: Above diagnoses are worsening    I discussed the diagnosis and the treatment options with Vivinae Rebolledo today. In Summary:  The various treatment options were outlined and discussed with Viviane eRbolledo including:  Conservative care options: physical therapy, ice, medications, bracing, and activity modification. The indications for therapeutic injections. The indications for additional imaging/laboratory studies. The indications for (possible future) interventions. After considering the various options discussed, Viviane Rebolledo elected to pursue a course of treatment that includes the followin. Medications:  No further recommendations for new medications. 2. PT:  Encouraged to continue with HEP. 3. Further studies:  No further studies. 4. Interventional:  We discussed pursuing lumbar medial branch blocks for diagnostic purposes. Based on physical exam findings of increased pain with facet loading and radiologic imaging, we will pursue lumbar medial branch blocks at the bilateral L3, L4, L5 DR #2 levels. Risks, benefits and alternatives were discussed. These include but are not limited to bleeding, infection, increased pain, lack of pain relief and nerve injury. The patient verbalized understanding and would like to proceed. If there is significant pain relief and functional improvement, the patient may be a good candidate for Radiofrequency ablation. As such, I have confirmed the patient has met the general requirements including failure of conservative management including prescription strength analgesics, adjunctive medications were utilized , therapeutic exercise program, and no underlying addiction or behavioral disorders were identified to the ability of the provider. Symptoms are impacting their ADLs or iADLs such as walking and transferring and significant pain was noted in the HPI. Imaging studies as noted in the diagnostic imaging section of the consultation were reviewed and correlated with clinical findings. Fluoroscopy is utilized for interventional procedures. For second Diagnostic MBB  A confirmatory injection is being requested due to a positive response of 80% relief of the primary index pain with onset and duration of relief being consistent with local anesthetic utilized.      5. Follow up:  1-2 weeks Zay Hernandez was instructed to call the office if his symptoms worsen or if new symptoms appear prior to the next scheduled visit. He is specifically instructed to contact the office between now & his scheduled appointment if he has concerns related to his condition or if he needs assistance in scheduling the above tests. He is welcome to call for an appointment sooner if he has any additional concerns or questions. Deja Baig PA-C   Board Certified by the [employer] on Certification of Physician Assistants  Rosa Erickson 60  Partner of [facility])             This dictation was performed with a verbal recognition program [facility]) and it was checked for errors. It is possible that there are still dictated errors within this office note. If so, please bring any errors to my attention for an addendum. All efforts were made to ensure that this office note is accurate.

## 2021-01-11 NOTE — LETTER
[facility]  [facility] [address] [address]  Phone: [phone]  Fax: [phone]    Ric Simmons        January 11, 2021     Patient: Hector Campbell   YOB: 1935   Date of Visit: 1/11/2021       To Whom It May Concern: It is my medical opinion that Celestino Daly may return to light duty immediately with the following restrictions: no standing for longer than 20 minutes without a 10-15 minute sitting break. .    If you have any questions or concerns, please don't hesitate to call.     Sincerely,    Fer Trent PA-C

## 2021-01-11 NOTE — LETTER
Please schedule the following with:     Date:   21 @ 11:10   Account: [id number]  Patient: Latanya Molina    : 1935  Address:  [address] [place] [address]    Phone (H):  [phone] (home)      ----------------------------------------------------------------------------------------------  Diagnosis:     ICD-10-CM    1. Spondylosis without myelopathy or radiculopathy, lumbar region  M47.816    2. DDD (degenerative disc disease), lumbar  M51.36    3. Sacroiliac joint dysfunction of both sides  M53.3          Levels: L3, L4, L5 DR  Side: Bilateral  Procedure: Lumbar medial branch blocks  CPT Codes [id number], 23777    ----------------------------------------------------------------------------------------------  Injection # 2  [facility]    Attending Physician       David Workman. Brigette Puri MD.  ----------------------------------------------------------------------------------------------  Injection Scheduled For:    At:    1st Insurance [facility]   Pre-Cert#  2nd Insurance     Pre-Cert#    Comments: Patient may drive home per LKS. No sedation. No     · Infection control  ? Tested positive for MRSA in past 12 months:  no  ? Tested positive for MSSA \"staph infection\" in past 12 months: no  ? Tested positive for VRE (Vancomycin Resistant Enterococci) in past 12 months:   no  ? Currently on any antibiotics for an infection: no  · Anticoagulants:  ? On a blood thinner:  Asa/ yes  Plavix - do not stop   ?  Any history of bleeding disorder: no   · Advanced Liver disease: no   · Advanced Renal disease: no   · Glaucoma: no   · Diabetes: no      Sedation:         No  -----------------------------------------------------------------------------------------------  Allergies   Allergen Reactions    Isosorbide Nitrate Other (See Comments)     headache    Nitroglycerin Other (See Comments)     headache    Meropenem Rash

## 2021-01-13 ENCOUNTER — TELEPHONE (OUTPATIENT)
Dept: ORTHOPEDIC SURGERY | Age: 86
End: 2021-01-13

## 2021-01-13 NOTE — PROGRESS NOTES
PATIENT REACHED   YES_X___NO____    PREOP INSTRUCTIONS LEFT ON VM NUMBER_______________      DATE__1/18/21_______ TIME__1107_______ARRIVAL__1007______PLACE_masc___________  NOTHING TO EAT OR DRINK  AFTER MIDNIGHT THE EVENING PRIOR OR AS INSTRUCTED BY YOUR DR.  Trudi Ladchase NEED A RESPONSIBLE ADULT AGE 18 OR OLDER TO DRIVE YOU HOME  PLEASE BRING INSURANCE CARD. PICTURE ID AND COMPLETE LIST OF MEDS  WEAR LOOSE COMFORTABLE CLOTHING  FOLLOW ANY INSTRUCTIONS YOUR DRS OFFICE HAS GIVEN YOU,INCLUDING WHAT MEDICATIONS TO TAKE THE AM OF PROCEDURE AND WHEN AND IF YOU NEED TO STOP ANY BLOOD THINNERS. IF YOU HAVE QUESTIONS REGARDING THIS CALL THE OFFICE  THE GOAL BLOOD SUGAR THE AM OF PROCEDURE  OR LESS ABOVE THAT THE PROCEDURE MAY BE CANCELLED  ANY QUESTIONS CALL YOUR DOCTOR. ALSO,PLEASE READ THE INSTRUCTION PACKET FROM YOUR DR IF YOU RECEIVED ONE. SPINE INTERVENTION NUMBER [phone]      OTHER___________________________________      VISITOR POLICY(subject to change)      There is a one visitor policy at [facility] for all surgeries and endoscopies. Whether the visitor can stay or will be asked to wait in the car will depend on the current policy and if social distancing can be maintained. The policy is subject to change at any time. Please make sure the visitor has a cell phone that is on,charged and able to accept calls, as this may be the way that the staff communicates with them. Pain management is NO VISITOR policyThe patients ride is expected to remain in the car with a cell phone for communication. If the ride is leaving the hospital grounds please make sure they are back in time for pickup. Have the patient inform the staff on arrival what their rides plans are while the patient is in the facility. At the MAIN there is one visitor allowed. Please note that the visitor policy is subject to change.

## 2021-01-13 NOTE — TELEPHONE ENCOUNTER
PA requsted via [facility] by online thru [place] w/clinicals.   Reference # [id number]

## 2021-01-15 NOTE — TELEPHONE ENCOUNTER
Auth: # [id number]    Date: 1/14/2021 thru 7/13/2021  Type of SX:  Outpatient MBB #2  Location: [facility]  CPT: 18500, 20077   DX Code: N33.079, M51.36, M53.3  SX area: Lumbar spine  Insurance: [employer]    CPT: 50 MOD, 87493 26, [id number]  [address] [place] [address]

## 2021-01-18 ENCOUNTER — APPOINTMENT (OUTPATIENT)
Dept: GENERAL RADIOLOGY | Age: 86
End: 2021-01-18
Attending: PHYSICAL MEDICINE & REHABILITATION
Payer: MEDICARE

## 2021-01-18 ENCOUNTER — HOSPITAL ENCOUNTER (OUTPATIENT)
Age: 86
Setting detail: OUTPATIENT SURGERY
Discharge: HOME | End: 2021-01-18
Attending: PHYSICAL MEDICINE & REHABILITATION | Admitting: PHYSICAL MEDICINE & REHABILITATION
Payer: MEDICARE

## 2021-01-18 VITALS
TEMPERATURE: 97.9 F | BODY MASS INDEX: 26.82 KG/M2 | RESPIRATION RATE: 16 BRPM | DIASTOLIC BLOOD PRESSURE: 88 MMHG | WEIGHT: 198 LBS | OXYGEN SATURATION: 95 % | HEIGHT: 72 IN | HEART RATE: 69 BPM | SYSTOLIC BLOOD PRESSURE: 149 MMHG

## 2021-01-18 PROCEDURE — 2500000003 HC RX 250 WO HCPCS: Performed by: PHYSICAL MEDICINE & REHABILITATION

## 2021-01-18 PROCEDURE — 77003 FLUOROGUIDE FOR SPINE INJECT: CPT

## 2021-01-18 PROCEDURE — 2709999900 HC NON-CHARGEABLE SUPPLY: Performed by: PHYSICAL MEDICINE & REHABILITATION

## 2021-01-18 PROCEDURE — 3610000058 HC PAIN LEVEL 5 BASE (NON-OR): Performed by: PHYSICAL MEDICINE & REHABILITATION

## 2021-01-18 RX ORDER — LIDOCAINE HYDROCHLORIDE 10 MG/ML
INJECTION, SOLUTION EPIDURAL; INFILTRATION; INTRACAUDAL; PERINEURAL
Status: COMPLETED | OUTPATIENT
Start: 2021-01-18 | End: 2021-01-18

## 2021-01-18 RX ORDER — BUPIVACAINE HYDROCHLORIDE 5 MG/ML
INJECTION, SOLUTION EPIDURAL; INTRACAUDAL
Status: COMPLETED | OUTPATIENT
Start: 2021-01-18 | End: 2021-01-18

## 2021-01-18 ASSESSMENT — PAIN SCALES - GENERAL: PAINLEVEL_OUTOF10: 1

## 2021-01-18 NOTE — PROGRESS NOTES
Procedural dressing dry and intact. Bilateral  extremities equal in strength. Discharge instructions reviewed with patient or responsible adult, signed and copy given. All home medications have been reviewed. All questions answered and patient or responsible adult verbalized understanding.   PAIN LEVEL AT DISCHARGE ___1__

## 2021-01-18 NOTE — OP NOTE
Patient:  Oz Quintero  YOB: 1935  Medical Record #:  [id number]   Place:   [address] [place] [address]  Date:  1/18/2021   Physician:  Rosi Cardoso MD, DARNELL    Procedure: Lumbar Medial Branch Blocks - Bilateral L3, L4 and L5 Dorsal ramus    #2    CPT (46791 Modifier 50 16779 Modifier 50)      Pre-Procedure Diagnosis: Lumbar spondylosis without radiculopathy      Post-Procedure Diagnosis: Same      Sedation: Local with 1% Lidocaine 5 ml      EBL: None      Complications: None      Procedure Summary:      The patient was seen in the office for complaints of low back pain. Review of the imaging and physical exam of the patient confirmed the pre-procedure diagnosis. After a thorough discussion of risks, benefits and alternatives informed consent was obtained. The patient was brought to the procedure suite and placed in the prone position. The skin overlying the lumbar spine was prepped with chloraprep and draped in the usual sterile fashion. Using fluoroscopic guidance, the right L4, L5 and sacral ala levels were identified. Through anesthetized skin a 22 gauge 3.5 inch curved tip spinal needle was advanced to the juncture of the superior articular process and the transverse process at the right L4 level where the right L3 medial branch resides. .3 ml of Isovue M 300 was instilled showing a nerve root outline pattern, without evidence of vascular spread. A total of 0.5 ml of 2% Lidocaine was instilled at the right L-4 level corresponding to the right L3 medial branch. This was repeated for the right L5 and sacral ala levels corresponding to the right L4 medial branch and L5 Dorsal ramus. The identical procedure was repeated on the left side. The needles were removed and a band-aid applied. The patient was transferred to the post-operative area in stable condition.
no

## 2021-01-18 NOTE — H&P
HISTORY AND PHYSICAL/PRE-SEDATION ASSESSMENT    Patient:  Kurt Navarro   :  1935  Medical Record No.:  [id number]   Date:  2021  Physician:  Nicole Chavarria M.D. Facility: [address] [place] [address]    HISTORY OF PRESENT ILLNESS:                 The patient is a 82 y.o. male whom presents with low back pain. Review of the imaging and physical exam of the patient confirmed the pre-procedure diagnosis. After a thorough discussion of risks, benefits and alternatives informed consent was obtained. Past Medical History:   Past Medical History:   Diagnosis Date    Aortic valve disease     Arthritis     BPH (benign prostatic hyperplasia)     Coronary artery vasospasm ([facility] [address]     Diverticulosis     H/O squamous cell carcinoma of skin     Hematuria 2017    HTN (hypertension)     Hyperlipidemia     Mixed hyperlipidemia 2016    Osteoarthritis of right knee     PSA elevation       Past Surgical History:     Past Surgical History:   Procedure Laterality Date    APPENDECTOMY  1970    CARDIAC PACEMAKER PLACEMENT  2016    Angel Fry MD    COSMETIC SURGERY      skin cancer - base of nose    HERNIA REPAIR      JOINT REPLACEMENT  2015    Tran Friendly MD   [address] [place] [address] Rd    exploratoryt for perf diverticulum    NERVE BLOCK Bilateral 2021    BILATERAL L3,L4,L5 DORSAL RAMUS MEDIAL BRANCH BLOCKS WITH FLUOROSCOPY performed by Nicole Chavarria MD at [address] [place] [address] Left 2020    LEFT L3 AND L4  TRANSFORAMINAL EPIDURAL STEROID INJECTION WITH FLUOROSCOPY performed by Nicole [facility]avarria MD at [facility]  2011    biopsy - [address] [place] [address]  as a child     Current Medications:   Prior to Admission medications    Medication Sig Start Date End Date Taking?  Authorizing Provider   sertraline (ZOLOFT) 50 MG tablet TAKE 1 TABLET BY MOUTH ONE TIME A DAY  9/10/20  Yes Radha Valerio MD   Multiple Vitamins-Minerals (PRESERVISION AREDS PO) Take by mouth   Yes Historical Provider, MD   finasteride (PROSCAR) 5 MG tablet Take 5 mg by mouth daily   Yes Historical Provider, MD   tamsulosin (FLOMAX) 0.4 MG capsule Take 0.4 mg by mouth daily   Yes Historical Provider, MD   ramipril (ALTACE) 2.5 MG capsule Take 2.5 mg by mouth daily   Yes Historical Provider, MD   acetaminophen (TYLENOL) 325 MG tablet Take 650 mg by mouth every 6 hours as needed for Pain   Yes Historical Provider, MD   aspirin 81 MG tablet Take 81 mg by mouth daily. Yes Historical Provider, MD   metoprolol (TOPROL-XL) 25 MG XL tablet Take 25 mg by mouth 2 times daily    Yes Historical Provider, MD   clopidogrel (PLAVIX) 75 MG tablet Take 75 mg by mouth daily. Yes Historical Provider, MD   simvastatin (ZOCOR) 20 MG tablet Take 20 mg by mouth nightly. Yes Historical Provider, MD   Multiple Vitamins-Minerals (THERAPEUTIC MULTIVITAMIN-MINERALS) tablet Take 1 tablet by mouth daily. Yes Historical Provider, MD   tiZANidine (ZANAFLEX) 2 MG tablet Take 1 tablet by mouth every evening 10/9/20   ELISHA Stoddard   loratadine (CLARITIN) 10 MG tablet Take 1 tablet by mouth daily  Patient taking differently: Take 10 mg by mouth daily PRN 1/8/18   Ace Joel MD   fluticasone (FLONASE) 50 MCG/ACT nasal spray 2 sprays by Nasal route daily. 3/5/15   Ace Joel MD     Allergies:  Isosorbide nitrate, Nitroglycerin, and Meropenem  Social History:    reports that he quit smoking about 5 years ago. He has never used smokeless tobacco. He reports current alcohol use. He reports that he does not use drugs. Family History:   Family History   Problem Relation Age of Onset    Heart Disease Mother     Pacemaker Mother     Cancer Neg Hx     Diabetes Neg Hx     Stroke Neg Hx        Vitals: Blood pressure 132/82, pulse 85, temperature 97.9 °F (36.6 °C), temperature source Temporal, resp.  rate 16, height 6' (1.829 m), weight 198 lb (89.8 kg), SpO2 95 %.      PHYSICAL EXAM:including affected areas  Cardiovascular: Normal rate, regular rhythm, normal heart sounds. Pulmonary/Chest: No wheezes. Extremities: Moves all extremities equally  Cervical and Lumbar Spine: Painful range of motion, no midline tenderness       Diagnosis:Lumbar spondylosis without radiculopathy  M47.816   M51.36   M53.3    Plan: Proceed with planned procedure      ASA CLASS:         []   I. Normal, healthy adult           [x]   II.  Mild systemic disease            []   III. Severe systemic disease      Mallampati: Mallampati Class II - (soft palate, fauces & uvula are visible)      Sedation plan:   [x]  Local              []  Minimal                  []  General anesthesia    Patient's condition acceptable for planned procedure/sedation. Post Procedure Plan   Return to same level of care   ______________________     The patient was counseled at length about the risks of alia Covid-19 in the caitlyn-operative and post-operative states including the recovery window of their procedure. The patient was made aware that alia Covid-19 after a surgical procedure may worsen their prognosis for recovering from the virus and lend to a higher morbidity and or mortality risk. The patient was given the options of postponing their procedure. All of the risks, benefits, and alternatives were discussed. The patient does wish to proceed with the procedure. The risks and benefits as well as alternatives to the procedure have been discussed with the patient and or family. The patient and or next of kin understands and agrees to proceed.     Jaquelin Hinkle M.D.

## 2021-01-20 ENCOUNTER — TELEPHONE (OUTPATIENT)
Dept: ORTHOPEDIC SURGERY | Age: 86
End: 2021-01-20

## 2021-01-20 ENCOUNTER — OFFICE VISIT (OUTPATIENT)
Dept: ORTHOPEDIC SURGERY | Age: 86
End: 2021-01-20
Payer: MEDICARE

## 2021-01-20 VITALS — RESPIRATION RATE: 12 BRPM | TEMPERATURE: 94.1 F | BODY MASS INDEX: 26.82 KG/M2 | WEIGHT: 198 LBS | HEIGHT: 72 IN

## 2021-01-20 DIAGNOSIS — M53.3 SACROILIAC JOINT DYSFUNCTION OF BOTH SIDES: ICD-10-CM

## 2021-01-20 DIAGNOSIS — M47.816 SPONDYLOSIS WITHOUT MYELOPATHY OR RADICULOPATHY, LUMBAR REGION: Primary | ICD-10-CM

## 2021-01-20 DIAGNOSIS — M51.36 DDD (DEGENERATIVE DISC DISEASE), LUMBAR: ICD-10-CM

## 2021-01-20 PROCEDURE — 99214 OFFICE O/P EST MOD 30 MIN: CPT | Performed by: PHYSICIAN ASSISTANT

## 2021-01-20 NOTE — LETTER
Please schedule the following with:     Date:   21 @ 10:30   Account: [id number]  Patient: Shantel Dey    : 1935  Address:  [address] [place] [address]    Phone (H):  [phone] (home)      ----------------------------------------------------------------------------------------------  Diagnosis:     ICD-10-CM    1. Spondylosis without myelopathy or radiculopathy, lumbar region  M47.816    2. DDD (degenerative disc disease), lumbar  M51.36    3. Sacroiliac joint dysfunction of both sides  M53.3      Procedure: Lumbar Radiofrequency Ablation     Levels: L3, L4, L5 DR  Side: Bilateral   CPT Codes E4743502, [id number]    ----------------------------------------------------------------------------------------------  Injection # 1  ASC    Attending Physician       Idalia Lemon MD.  ----------------------------------------------------------------------------------------------  Injection Scheduled For:    At:    1st Insurance [employer] M[employer] ADVANTAGE Pre-Cert#  2nd Insurance     Pre-Cert#    Comments:  COVID TEST Needed: no    · Infection control  ? Tested positive for MRSA in past 12 months:  no  ? Tested positive for MSSA \"staph infection\" in past 12 months: no  ? Tested positive for VRE (Vancomycin Resistant Enterococci) in past 12 months:   no  ? Currently on any antibiotics for an infection: no  · Anticoagulants:  ? On a blood thinner:  Asa/ yes  Plavix - do not stop   ?  Any history of bleeding disorder: no   · Advanced Liver disease: no   · Advanced Renal disease: no   · Glaucoma: no   · Diabetes: no      Sedation:         No  -----------------------------------------------------------------------------------------------  Allergies   Allergen Reactions    Isosorbide Nitrate Other (See Comments)     headache    Nitroglycerin Other (See Comments)     headache    Meropenem Rash

## 2021-01-20 NOTE — PROGRESS NOTES
Follow up: Bennett Ren  1935  [id number]         Chief Complaint   Patient presents with    Lower Back Pain     F/u MBB Bilateral L3, L4, L5  1/18/21 #2         HISTORY OF PRESENT ILLNESS:  Mr. Raul Barnett is a 82 y.o. male returns for a follow up visit for multiple medical problems. His current presenting problems are   1. Spondylosis without myelopathy or radiculopathy, lumbar region    2. DDD (degenerative disc disease), lumbar    3. Sacroiliac joint dysfunction of both sides    . As per information/history obtained from the PADT(patient assessment and documentation tool) - He complains of pain in the lower back with radiation to the lower back He rates the pain 5/10 and describes it as dull, aching. Pain is made worse by: walking, standing. He denies side effects from the current pain regimen. Patient reports that since the last follow up visit the physical functioning is unchanged, family/social relationships are unchanged, mood is unchanged and sleep patterns are unchanged, and that the overall functioning is unchanged. Patient denies neurological bowel or bladder. The patient presents today in follow-up after bilateral L3, L4, L5 DR MBB #2 that was completed on 1/18/2021. The patient describes that he had 80% relief for approximately 5 hours following the procedure. This was his second successful nerve block and we would like to proceed with the ablation at this time. The patient describes that standing and sitting does increase his pain. The patient can sit for approximately 1 hour without a considerable amount of pain. Associated signs and symptoms:   Neurogenic bowel or bladder symptoms:  no   Perceived weakness:  no   Difficulty walking:  no            Past medical, surgical, social and family history reviewed with the patient.      Past Medical History:   Past Medical History:   Diagnosis Date    Aortic valve disease     Arthritis     BPH (benign prostatic hyperplasia)  Coronary artery vasospasm (H[facility])     Diverticulosis     H/O squamous cell carcinoma of skin     Hematuria 7/26/2017    HTN (hypertension)     Hyperlipidemia     Mixed hyperlipidemia 1/7/2016    Osteoarthritis of right knee     PSA elevation       Past Surgical History:     Past Surgical History:   Procedure Laterality Date    APPENDECTOMY  1970    CARDIAC PACEMAKER PLACEMENT  12/2016    Nessa Bernard MD    COSMETIC SURGERY  2010    skin cancer - base of nose    HERNIA REPAIR  2011    JOINT REPLACEMENT  7/21/2015    Diana Rogers MD   [address]    exploratoryt for perf diverticulum    NERVE BLOCK Bilateral 1/6/2021    BILATERAL L3,L4,L5 DORSAL RAMUS MEDIAL BRANCH BLOCKS WITH FLUOROSCOPY performed by Gio Brooks MD at [facility] Bilateral 1/18/2021    BILATERAL L3,L4,L5 DORSAL RAMUS MEDIAL BRANCH BLOCKS WITH FLUROSCOPY performed by Gio Brooks MD at [address] [place] [address] Left 12/7/2020    LEFT L3 AND L4  TRANSFORAMINAL EPIDURAL STEROID INJECTION WITH FLUOROSCOPY performed by Gio Brooks MD at [facility]  2011    biopsy - [facility]   [address] 78  as a child     Current Medications:     Current Outpatient Medications:     tiZANidine (ZANAFLEX) 2 MG tablet, Take 1 tablet by mouth every evening, Disp: 30 tablet, Rfl: 0    sertraline (ZOLOFT) 50 MG tablet, TAKE 1 TABLET BY MOUTH ONE TIME A DAY , Disp: 30 tablet, Rfl: 5    Multiple Vitamins-Minerals (PRESERVISION AREDS PO), Take by mouth, Disp: , Rfl:     loratadine (CLARITIN) 10 MG tablet, Take 1 tablet by mouth daily (Patient taking differently: Take 10 mg by mouth daily PRN), Disp: 30 tablet, Rfl: 1    finasteride (PROSCAR) 5 MG tablet, Take 5 mg by mouth daily, Disp: , Rfl:     tamsulosin (FLOMAX) 0.4 MG capsule, Take 0.4 mg by mouth daily, Disp: , Rfl:     ramipril (ALTACE) 2.5 MG capsule, Take 2.5 mg by mouth daily, Disp: , Rfl:   acetaminophen (TYLENOL) 325 MG tablet, Take 650 mg by mouth every 6 hours as needed for Pain, Disp: , Rfl:     aspirin 81 MG tablet, Take 81 mg by mouth daily. , Disp: , Rfl:     metoprolol (TOPROL-XL) 25 MG XL tablet, Take 25 mg by mouth 2 times daily , Disp: , Rfl:     clopidogrel (PLAVIX) 75 MG tablet, Take 75 mg by mouth daily. , Disp: , Rfl:     simvastatin (ZOCOR) 20 MG tablet, Take 20 mg by mouth nightly., Disp: , Rfl:     Multiple Vitamins-Minerals (THERAPEUTIC MULTIVITAMIN-MINERALS) tablet, Take 1 tablet by mouth daily. , Disp: , Rfl:     fluticasone (FLONASE) 50 MCG/ACT nasal spray, 2 sprays by Nasal route daily. , Disp: 1 Bottle, Rfl: 0  Allergies:  Isosorbide nitrate, Nitroglycerin, and Meropenem  Social History:    reports that he quit smoking about 5 years ago. He has never used smokeless tobacco. He reports current alcohol use. He reports that he does not use drugs. Family History:   Family History   Problem Relation Age of Onset    Heart Disease Mother     Pacemaker Mother     Cancer Neg Hx     Diabetes Neg Hx     Stroke Neg Hx        REVIEW OF SYSTEMS:   CONSTITUTIONAL: Denies unexplained weight loss, fevers, chills or fatigue  NEUROLOGICAL: Denies unsteady gait or progressive weakness  MUSCULOSKELETAL: Denies joint swelling or redness  GI: Denies nausea, vomiting, diarrhea   : Denies bowel or bladder issues       PHYSICAL EXAM:    Vitals: Temperature 94.1 °F (34.5 °C), resp. rate 12, height 6' (1.829 m), weight 198 lb (89.8 kg). GENERAL EXAM:  · General Apparence: Patient is adequately groomed with no evidence of malnutrition. · Psychiatric: Orientation: The patient is oriented to time, place and person. The patient's mood and affect are appropriate   · Vascular: Examination reveals no swelling and palpation reveals no tenderness in upper or lower extremities. Good capillary refill. · The lymphatic examination of the neck, axillae and groin reveals all areas to be without enlargement or induration. · Sensation is intact without deficit in the upper and lower extremities to light touch. · Coordination of the upper and lower extremities are normal.  · RIGHT UPPER EXTREMITY:  Inspection/examination of the right upper extremity does not show any tenderness, deformity or injury. Range of motion is unremarkable and pain-free. There is no gross instability. There are no rashes, ulcerations or lesions. Strength and tone are normal. No atrophy or abnormal movements are noted. · LEFT UPPER EXTREMITY: Inspection/examination of the left upper extremity does not show any tenderness, deformity or injury. Range of motion is unremarkable and pain-free. There is no gross instability. There are no rashes, ulcerations or lesions. Strength and tone are normal. No atrophy or abnormal movements are noted. LUMBAR/SACRAL EXAMINATION:  · Inspection: Local inspection shows no step-off or bruising. Lumbar alignment is normal. No instability is noted. · Palpation:   No evidence of tenderness at the midline. Lumbar paraspinal tenderness: Mild L4/5 and L5/S1 tenderness  Bursal tenderness No tenderness bilaterally  There is no paraspinal spasm. · Range of Motion: limited by 50% in all planes due to pain specifically with extension and facet loading bilaterally. · Strength:   Strength testing is 5/5 in all muscle groups tested. · Special Tests:   Straight leg raise and crossed SLR negative. Celso's testing is negative bilaterally. FADIR's testing is negative bilaterally. · Skin: There are no rashes, ulcerations or lesions. · Reflexes: Reflexes are symmetrically 2+ at the patellar and ankle tendons. Clonus absent bilaterally at the feet.   · Gait & station: normal, patient ambulates without assistance and no ataxia  · Additional Examinations: · RIGHT LOWER EXTREMITY: Inspection/examination of the right lower extremity does not show any tenderness, deformity or injury. Range of motion is normal and pain-free. There is no gross instability. There are no rashes, ulcerations or lesions. Strength and tone are normal. No atrophy or abnormal movements are noted. · LEFT LOWER EXTREMITY:  Inspection/examination of the left lower extremity does not show any tenderness, deformity or injury. Range of motion is normal and pain-free. There is no gross instability. There are no rashes, ulcerations or lesions. Strength and tone are normal. No atrophy or abnormal movements are noted. Diagnostic Testing:    MR Lumbar spine shows  from 11/16/20:     FINDINGS:   ALIGNMENT: Mild dextroconvex curvature with right lateral listhesis of L4 on L5, though there    is autofusion across this level. BONES: Unremarkable.  No aggressive osseous lesion or fracture       Multilevel moderate changes of degenerative disc disease and diffuse facet arthropathy are    present within the lower thoracic and visualized lumbar spine.  Multilevel loss of disc space    height and vacuum disc phenomena is present.       DISC LEVELS:    T12-L1:  Small disc osteophyte complex mildly narrows the canal.  Ligament of the redundancy    from associated moderate facet arthropathy.  Mild right and moderate left neural foraminal    narrowing.    L1-2:      Diffuse disc bulge and ligamentum flavum redundancy resulting severe facet    arthropathy moderately narrows the canal.  There is mild bilateral neural foraminal narrowing.        L2-3:      Diffuse disc bulge with superimposed calcified/ossified component centrally in the    setting of ligamentum flavum redundancy from severe facet arthropathy at least moderately    narrows the canal.  There is mild bilateral neural foraminal    narrowing.       L3-4:      Diffuse disc bulge and ligamentum flavum redundancy in the setting of severe facet arthropathy severely narrows the canal.  Severe left and mild right neural foraminal narrowing. L4-5:      At this level as undergone auto fusion, there is a bony disc osteophyte with severe    ligamentum flavum redundancy from facet arthropathy, at least moderately narrowing the canal.     Moderate bilateral neural foraminal narrowing.    L5-S1:    Diffuse disc bulge causes no significant canal narrowing.  Ligamentum flavum    redundancy from severe facet arthropathy causes severe right and moderate left neural foraminal    narrowing.       LUMBOSACRAL JUNCTION: UUnremarkable   SACRUM AND SI JOINTS:  Some degenerative sclerosis is seen along the sacroiliac joints. OTHER:  Cholelithiasis.  Aortoiliac atherosclerotic calcifications are present.            IMPRESSION:           Multilevel severe spondylosis within the lumbar spine.  Auto fusion across the L4-5 level which    demonstrates right lateral listhesis of L4 on L5, contributing to mild dextroconvex scoliosis    within the lumbar spine.  Diffuse severe facet arthropathy.       Severe canal narrowing at L3-4 with possible severe narrowing at adjacent levels.       Severe neural foraminal narrowing: Left L3-4, and right L5-S1       Results for orders placed or performed in visit on 12/31/20   COVID-19   Result Value Ref Range    SARS-CoV-2 Not Detected Not detected     Impression:       1. Spondylosis without myelopathy or radiculopathy, lumbar region    2. DDD (degenerative disc disease), lumbar    3. Sacroiliac joint dysfunction of both sides        Plan:  Clinical Course: shows no change    I discussed the diagnosis and the treatment options with Lisha Pinedar today.      In Summary:  The various treatment options were outlined and discussed with Lisha Slider including: Conservative care options: physical therapy, ice, medications, bracing, and activity modification. The indications for therapeutic injections. The indications for additional imaging/laboratory studies. The indications for (possible future) interventions. After considering the various options discussed, Keeley Jacobsen elected to pursue a course of treatment that includes the followin. Medications:  No further recommendations for new medications. 2. PT:  Encouraged to continue with Home exercise program.    3. Further studies: No further studies. 4. Interventional:  We have discussed options such as radiofrequency ablation after undergoing 2 successful lumbar medial branch blocks. Risks include but limited to bleeding, infection, neuritis, increased pain, lack of pain relief, motor nerve injury. This does not include all possible risks. The patient verbalized understanding would like to proceed. Side effects and benefits were also discussed. Bilateral L3, L4, L5 DR. As such, I have confirmed the patient has met the general requirements including failure of conservative management including prescription strength analgesics, adjunctive medications were utilized , therapeutic exercise program, and no underlying addiction or behavioral disorders were identified to the ability of the provider. Symptoms are impacting their ADLs or iADLs such as standing, sitting and significant pain was noted in the HPI. Imaging studies as noted in the diagnostic imaging section of the consultation were reviewed and correlated with clinical findings. Fluoroscopy is utilized for interventional procedures. For first RFN  Dual diagnostic MBB injections produced 80% or greater pain relief of the primary index pain and the onset and minimum duration of relief consistent with the local anesthetic administered.      5. Follow up:  4-6 weeks Humberto Chance was instructed to call the office if his symptoms worsen or if new symptoms appear prior to the next scheduled visit. He is specifically instructed to contact the office between now & his scheduled appointment if he has concerns related to his condition or if he needs assistance in scheduling the above tests. He is welcome to call for an appointment sooner if he has any additional concerns or questions. SULMA Hurst, PA-C  Board Certified by the [employer] on Certification of Physician Assistants  [facility] ([facility])         This dictation was performed with a verbal recognition program [facility] ) and it was checked for errors. It is possible that there are still dictated errors within this office note. If so, please bring any errors to my attention for an addendum. All efforts were made to ensure that this office note is accurate.

## 2021-01-25 ENCOUNTER — APPOINTMENT (OUTPATIENT)
Dept: GENERAL RADIOLOGY | Age: 86
End: 2021-01-25
Attending: PHYSICAL MEDICINE & REHABILITATION
Payer: MEDICARE

## 2021-01-25 ENCOUNTER — HOSPITAL ENCOUNTER (OUTPATIENT)
Age: 86
Setting detail: OUTPATIENT SURGERY
Discharge: HOME | End: 2021-01-25
Attending: PHYSICAL MEDICINE & REHABILITATION | Admitting: PHYSICAL MEDICINE & REHABILITATION
Payer: MEDICARE

## 2021-01-25 VITALS
RESPIRATION RATE: 16 BRPM | TEMPERATURE: 98 F | DIASTOLIC BLOOD PRESSURE: 65 MMHG | SYSTOLIC BLOOD PRESSURE: 116 MMHG | BODY MASS INDEX: 26.82 KG/M2 | WEIGHT: 198 LBS | HEIGHT: 72 IN | OXYGEN SATURATION: 95 % | HEART RATE: 59 BPM

## 2021-01-25 PROCEDURE — 2500000003 HC RX 250 WO HCPCS: Performed by: PHYSICAL MEDICINE & REHABILITATION

## 2021-01-25 PROCEDURE — 3209999900 FLUORO FOR SURGICAL PROCEDURES

## 2021-01-25 PROCEDURE — 3610000058 HC PAIN LEVEL 5 BASE (NON-OR): Performed by: PHYSICAL MEDICINE & REHABILITATION

## 2021-01-25 PROCEDURE — 99152 MOD SED SAME PHYS/QHP 5/>YRS: CPT | Performed by: PHYSICAL MEDICINE & REHABILITATION

## 2021-01-25 PROCEDURE — 2709999900 HC NON-CHARGEABLE SUPPLY: Performed by: PHYSICAL MEDICINE & REHABILITATION

## 2021-01-25 PROCEDURE — 3610000059 HC PAIN LEVEL 5 ADDL 15 MIN (NON-OR): Performed by: PHYSICAL MEDICINE & REHABILITATION

## 2021-01-25 PROCEDURE — 6360000002 HC RX W HCPCS: Performed by: PHYSICAL MEDICINE & REHABILITATION

## 2021-01-25 PROCEDURE — 99153 MOD SED SAME PHYS/QHP EA: CPT | Performed by: PHYSICAL MEDICINE & REHABILITATION

## 2021-01-25 RX ORDER — FENTANYL CITRATE 50 UG/ML
INJECTION, SOLUTION INTRAMUSCULAR; INTRAVENOUS
Status: COMPLETED | OUTPATIENT
Start: 2021-01-25 | End: 2021-01-25

## 2021-01-25 RX ORDER — MIDAZOLAM HYDROCHLORIDE 1 MG/ML
INJECTION INTRAMUSCULAR; INTRAVENOUS
Status: COMPLETED | OUTPATIENT
Start: 2021-01-25 | End: 2021-01-25

## 2021-01-25 RX ORDER — LIDOCAINE HYDROCHLORIDE 20 MG/ML
INJECTION, SOLUTION EPIDURAL; INFILTRATION; INTRACAUDAL; PERINEURAL
Status: COMPLETED | OUTPATIENT
Start: 2021-01-25 | End: 2021-01-25

## 2021-01-25 RX ORDER — BUPIVACAINE HYDROCHLORIDE 5 MG/ML
INJECTION, SOLUTION EPIDURAL; INTRACAUDAL
Status: COMPLETED | OUTPATIENT
Start: 2021-01-25 | End: 2021-01-25

## 2021-01-25 RX ORDER — LIDOCAINE HYDROCHLORIDE 10 MG/ML
INJECTION, SOLUTION EPIDURAL; INFILTRATION; INTRACAUDAL; PERINEURAL
Status: COMPLETED | OUTPATIENT
Start: 2021-01-25 | End: 2021-01-25

## 2021-01-25 ASSESSMENT — PAIN SCALES - GENERAL
PAINLEVEL_OUTOF10: 0
PAINLEVEL_OUTOF10: 0

## 2021-01-25 ASSESSMENT — PAIN DESCRIPTION - DESCRIPTORS: DESCRIPTORS: DISCOMFORT

## 2021-01-25 NOTE — OP NOTE
Patient:  Andrea Bhatia  YOB: 1935  Medical Record #:  [id number]   Place:   [address] [place] [address]  Date:  1/25/2021   Physician:  No Hart MD, DARNELL    Procedure: Radiofrequency ablation of the Bilateral L3, L4 Medial branches and L5 Dorsal ramus    CPT 22253 Modifier 50 and 16782 Modifier 50     Pre-Procedure Diagnosis: Lumbar spondylosis without radiculopathy     Post-Procedure Diagnosis: Same     Sedation: Local with 1% Lidocaine 5 ml and 1 mg of IV Versed and 50 mcg of IV Fentanyl     EBL: None     Complications: None     Procedure Summary:     The patient was seen in the office for complaints of low back pain. Review of the imaging and physical exam of the patient confirmed the pre-procedure diagnosis. After a thorough discussion of risks, benefits and alternatives informed consent was obtained. The patient was brought to the procedure suite and placed in the prone position. The skin overlying the lumbar spine was prepped with chloraprep and draped in the usual sterile fashion. Using fluoroscopic guidance, the right L4, L5 and sacral ala levels were identified. Through anesthetized skin, a 20 gauge 100 mm RFL needle with a 10 mm active tip was advanced to the juncture of the superior articular process and the transverse process at the right L4 level where the right L3 medial branch resides. After the probe was instilled, motor testing took place up to 2 V without any paresthesia into the right leg. Then ablation took place at 80 C for 90 seconds. This was repeated for the right L5 and sacral ala levels corresponding to the right L4 medial branches and L5 Dorsal ramus. The identical procedure was repeated on the left side. Following the ablation, 10 mg of dexamethasone mixed with 0.5% Marcaine was instilled in 1/6th aliquots at each level. The needles were removed and a band-aid was applied at each level.      The patient was transferred to the post-operative area in stable condition.

## 2021-01-25 NOTE — PROGRESS NOTES
IV discontinued, catheter intact, and dressing applied. Procedural dressing dry and intact. Bilateral lower extremities equal in strength. Discharge instructions reviewed with patient or responsible adult, signed and copy given. All home medications have been reviewed. All questions answered and patient or responsible adult verbalized understanding.   PAIN LEVEL AT DISCHARGE __0__

## 2021-01-25 NOTE — H&P
HISTORY AND PHYSICAL/PRE-SEDATION ASSESSMENT    Patient:  Gabby Rader   :  1935  Medical Record No.:  [id number]   Date:  2021  Physician:  Pantera Edmond M.D. Facility: [address] [place] [address]    HISTORY OF PRESENT ILLNESS:                 The patient is a 82 y.o. male whom presents with low back pain. Review of the imaging and physical exam of the patient confirmed the pre-procedure diagnosis. After a thorough discussion of risks, benefits and alternatives informed consent was obtained.                 Past Medical History:   Past Medical History:   Diagnosis Date    Aortic valve disease     Arthritis     BPH (benign prostatic hyperplasia)     Coronary artery vasospasm ([address]     Diverticulosis     H/O squamous cell carcinoma of skin     Hematuria 2017    HTN (hypertension)     Hyperlipidemia     Mixed hyperlipidemia 2016    Osteoarthritis of right knee     PSA elevation       Past Surgical History:     Past Surgical History:   Procedure Laterality Date    APPENDECTOMY  1970    CARDIAC PACEMAKER PLACEMENT  2016    Ramy Dupree MD    COSMETIC SURGERY  2010    skin cancer - base of nose    HERNIA REPAIR      JOINT REPLACEMENT  2015    Molina Cárdenas MD bilat knees    LAPAROTOMY  1970    exploratoryt for perf diverticulum    NERVE BLOCK Bilateral 2021    BILATERAL L3,L4,L5 DORSAL RAMUS MEDIAL BRANCH BLOCKS WITH FLUOROSCOPY performed by Pantera Edmond MD at [facility] Bilateral 2021    BILATERAL L3,L4,L5 DORSAL RAMUS MEDIAL BRANCH BLOCKS WITH FLUROSCOPY performed by Pantera Edmond MD at [address] [place] [address] Left 2020    LEFT L3 AND L4  TRANSFORAMINAL EPIDURAL STEROID INJECTION WITH FLUOROSCOPY performed by Pantera Edmond MD at [facility]      biopsy - [facility]  as a child     Current Medications:   Prior to Admission medications    Medication Sig Start Date End Date Taking? Authorizing Provider   sertraline (ZOLOFT) 50 MG tablet TAKE 1 TABLET BY MOUTH ONE TIME A DAY  9/10/20  Yes Joann Mata MD   Multiple Vitamins-Minerals (PRESERVISION AREDS PO) Take by mouth   Yes Historical Provider, MD   finasteride (PROSCAR) 5 MG tablet Take 5 mg by mouth daily   Yes Historical Provider, MD   tamsulosin (FLOMAX) 0.4 MG capsule Take 0.4 mg by mouth daily   Yes Historical Provider, MD   ramipril (ALTACE) 2.5 MG capsule Take 2.5 mg by mouth daily   Yes Historical Provider, MD   acetaminophen (TYLENOL) 325 MG tablet Take 650 mg by mouth every 6 hours as needed for Pain   Yes Historical Provider, MD   aspirin 81 MG tablet Take 81 mg by mouth daily. Yes Historical Provider, MD   metoprolol (TOPROL-XL) 25 MG XL tablet Take 25 mg by mouth 2 times daily    Yes Historical Provider, MD   simvastatin (ZOCOR) 20 MG tablet Take 20 mg by mouth nightly. Yes Historical Provider, MD   tiZANidine (ZANAFLEX) 2 MG tablet Take 1 tablet by mouth every evening 10/9/20   ELISHA Rueda   loratadine (CLARITIN) 10 MG tablet Take 1 tablet by mouth daily  Patient taking differently: Take 10 mg by mouth daily PRN 1/8/18   Joann Mata MD   clopidogrel (PLAVIX) 75 MG tablet Take 75 mg by mouth daily. Historical Provider, MD   Multiple Vitamins-Minerals (THERAPEUTIC MULTIVITAMIN-MINERALS) tablet Take 1 tablet by mouth daily. Historical Provider, MD   fluticasone (FLONASE) 50 MCG/ACT nasal spray 2 sprays by Nasal route daily. 3/5/15   Joann Mata MD     Allergies:  Isosorbide nitrate, Nitroglycerin, and Meropenem  Social History:    reports that he quit smoking about 5 years ago. He has never used smokeless tobacco. He reports current alcohol use. He reports that he does not use drugs.   Family History:   Family History   Problem Relation Age of Onset    Heart Disease Mother     Pacemaker Mother     Cancer Neg Hx     Diabetes Neg Hx     Stroke Neg Hx        Vitals: Blood pressure 126/72, pulse 80, temperature 98 °F (36.7 °C), temperature source Temporal, resp. rate 16, height 6' (1.829 m), weight 198 lb (89.8 kg), SpO2 97 %. PHYSICAL EXAM:including affected areas  Cardiovascular: Normal rate, regular rhythm, normal heart sounds. Pulmonary/Chest: No wheezes. Extremities: Moves all extremities equally  Cervical and Lumbar Spine: Painful range of motion, no midline tenderness       Diagnosis:Lumbar spondylosis without radiculopathy  m47.816   m51.36   m53.3    Plan: Proceed with planned procedure      ASA CLASS:         []   I. Normal, healthy adult           [x]   II.  Mild systemic disease            []   III. Severe systemic disease      Mallampati: Mallampati Class II - (soft palate, fauces & uvula are visible)      Sedation plan:   [x]  Local              []  Minimal                  []  General anesthesia    Patient's condition acceptable for planned procedure/sedation. Post Procedure Plan   Return to same level of care   ______________________     The patient was counseled at length about the risks of alia Covid-19 in the caitlyn-operative and post-operative states including the recovery window of their procedure. The patient was made aware that alia Covid-19 after a surgical procedure may worsen their prognosis for recovering from the virus and lend to a higher morbidity and or mortality risk. The patient was given the options of postponing their procedure. All of the risks, benefits, and alternatives were discussed. The patient does wish to proceed with the procedure. The risks and benefits as well as alternatives to the procedure have been discussed with the patient and or family. The patient and or next of kin understands and agrees to proceed.     Scottie Carter M.D.

## 2021-01-27 NOTE — TELEPHONE ENCOUNTER
Peer to peer scheduled for 1/28/21 at 8:30 am with [facility]. Dr Keyanna Morgan will call ATC direct line. [facility]AS direct line is an alternative number.

## 2021-01-27 NOTE — TELEPHONE ENCOUNTER
PENDING DENIAL  Date: 1/25/2021  Type of SX: Outpatient RFA  Location: [facility]  CPT: 57269, 11067  SX area: Lumbar spine  REASON: Additional information requested  Peer to peer: [phone] option 4  Reference # [id number]      CPT 50 MOD, 60700 72, [id number]  NPR

## 2021-01-28 NOTE — TELEPHONE ENCOUNTER
P2P completed by Dr. John  on 1/28/21. Per [facility], procedure has been approved. No authorization information was given to [facility].

## 2021-03-26 DIAGNOSIS — F32.5 MAJOR DEPRESSION, SINGLE EPISODE, IN COMPLETE REMISSION (HCC): ICD-10-CM

## 2021-03-26 NOTE — TELEPHONE ENCOUNTER
Medication:   Requested Prescriptions     Pending Prescriptions Disp Refills    sertraline (ZOLOFT) 50 MG tablet [Pharmacy Med Name: Sertraline HCl Oral Tablet 50 MG] 30 tablet 0     Sig: TAKE 1 TABLET BY MOUTH EVERY DAY        Last Filled:      Patient Phone Number: [phone] (home)     Last appt: 12/21/2020   Next appt: 6/21/2021    Last OARRS: No flowsheet data found.     Preferred Pharmacy:   [facility] [id number] Sandee Friedman, [address] [place] [address]  Phone: [phone] Fax: [phone] - Roge Levine, [facility] [address] [phone] - F [phone]  [address] [place] [id number]  Phone: [phone] Fax: [phone]

## 2021-06-21 ENCOUNTER — OFFICE VISIT (OUTPATIENT)
Dept: PRIMARY CARE CLINIC | Age: 86
End: 2021-06-21
Payer: MEDICARE

## 2021-06-21 VITALS
BODY MASS INDEX: 26.45 KG/M2 | DIASTOLIC BLOOD PRESSURE: 62 MMHG | SYSTOLIC BLOOD PRESSURE: 106 MMHG | RESPIRATION RATE: 16 BRPM | OXYGEN SATURATION: 96 % | HEART RATE: 62 BPM | WEIGHT: 195 LBS | TEMPERATURE: 98 F

## 2021-06-21 DIAGNOSIS — R09.81 NASAL CONGESTION: ICD-10-CM

## 2021-06-21 DIAGNOSIS — F32.5 MAJOR DEPRESSION, SINGLE EPISODE, IN COMPLETE REMISSION (HCC): ICD-10-CM

## 2021-06-21 DIAGNOSIS — I50.22 CHRONIC SYSTOLIC HEART FAILURE (HCC): ICD-10-CM

## 2021-06-21 DIAGNOSIS — E78.2 MIXED HYPERLIPIDEMIA: ICD-10-CM

## 2021-06-21 DIAGNOSIS — E11.9 TYPE 2 DIABETES MELLITUS WITHOUT COMPLICATION, WITHOUT LONG-TERM CURRENT USE OF INSULIN (HCC): Primary | ICD-10-CM

## 2021-06-21 DIAGNOSIS — I20.1 PRINZMETAL ANGINA (HCC): ICD-10-CM

## 2021-06-21 DIAGNOSIS — I10 ESSENTIAL HYPERTENSION: ICD-10-CM

## 2021-06-21 LAB — HBA1C MFR BLD: 6.5 %

## 2021-06-21 PROCEDURE — 83036 HEMOGLOBIN GLYCOSYLATED A1C: CPT | Performed by: INTERNAL MEDICINE

## 2021-06-21 PROCEDURE — 99214 OFFICE O/P EST MOD 30 MIN: CPT | Performed by: INTERNAL MEDICINE

## 2021-06-21 SDOH — ECONOMIC STABILITY: FOOD INSECURITY: WITHIN THE PAST 12 MONTHS, THE FOOD YOU BOUGHT JUST DIDN'T LAST AND YOU DIDN'T HAVE MONEY TO GET MORE.: NEVER TRUE

## 2021-06-21 SDOH — ECONOMIC STABILITY: FOOD INSECURITY: WITHIN THE PAST 12 MONTHS, YOU WORRIED THAT YOUR FOOD WOULD RUN OUT BEFORE YOU GOT MONEY TO BUY MORE.: NEVER TRUE

## 2021-06-21 ASSESSMENT — ENCOUNTER SYMPTOMS
CONSTIPATION: 0
DIARRHEA: 0
WHEEZING: 0
CHEST TIGHTNESS: 0
COUGH: 0
ABDOMINAL PAIN: 0
VOMITING: 0
SORE THROAT: 0
RHINORRHEA: 0
SHORTNESS OF BREATH: 1
NAUSEA: 0

## 2021-06-21 ASSESSMENT — SOCIAL DETERMINANTS OF HEALTH (SDOH): HOW HARD IS IT FOR YOU TO PAY FOR THE VERY BASICS LIKE FOOD, HOUSING, MEDICAL CARE, AND HEATING?: NOT HARD AT ALL

## 2021-06-21 NOTE — PATIENT INSTRUCTIONS
-Fast 8-10 hours for labs  -Continue same medications  -Limit carbohydrates to 45 grams with meals and 15 grams with snacks  -Low sodium diet  -Low fat, low cholesterol diet  -Regular aerobic exercise

## 2021-06-21 NOTE — PROGRESS NOTES
Body mass index is 26.45 kg/m². Physical Exam  Nursing note reviewed. Constitutional:       General: He is not in acute distress. Appearance: Normal appearance. He is well-developed. Eyes:      General: Lids are normal.      Extraocular Movements: Extraocular movements intact. Conjunctiva/sclera: Conjunctivae normal.      Pupils: Pupils are equal, round, and reactive to light. Neck:      Thyroid: No thyromegaly. Vascular: No carotid bruit. Cardiovascular:      Rate and Rhythm: Normal rate and regular rhythm. Heart sounds: Normal heart sounds, S1 normal and S2 normal. No murmur heard. No friction rub. No gallop. Pulmonary:      Effort: Pulmonary effort is normal. No respiratory distress. Breath sounds: Normal breath sounds. No wheezing, rhonchi or rales. Abdominal:      General: Bowel sounds are normal. There is no distension. Palpations: Abdomen is soft. Tenderness: There is no abdominal tenderness. Musculoskeletal:      Cervical back: Neck supple. Right lower leg: No edema. Left lower leg: No edema. Lymphadenopathy:      Head:      Right side of head: No submandibular adenopathy. Left side of head: No submandibular adenopathy. Neurological:      Mental Status: He is alert.    Psychiatric:         Mood and Affect: Mood normal.           Results for POC orders placed in visit on 06/21/21   POCT glycosylated hemoglobin (Hb A1C)   Result Value Ref Range    Hemoglobin A1C 6.5 %     Lab Review   Office Visit on 12/31/2020   Component Date Value    SARS-CoV-2 12/31/2020 Not Detected    Orders Only on 12/22/2020   Component Date Value    Cholesterol, Total 12/22/2020 172     Triglycerides 12/22/2020 153*    HDL 12/22/2020 44     LDL Calculated 12/22/2020 97     VLDL Cholesterol Calcula* 12/22/2020 31     Hemoglobin A1C 12/22/2020 6.2     eAG 12/22/2020 131.2     Sodium 12/22/2020 142     Potassium 12/22/2020 4.8     Chloride 12/22/2020 104     CO2 12/22/2020 29     Anion Gap 12/22/2020 9     Glucose 12/22/2020 119*    BUN 12/22/2020 20     CREATININE 12/22/2020 1.1     GFR Non- 12/22/2020 >60     GFR  12/22/2020 >60     Calcium 12/22/2020 9.8     Total Protein 12/22/2020 6.7     Albumin 12/22/2020 4.3     Albumin/Globulin Ratio 12/22/2020 1.8     Total Bilirubin 12/22/2020 0.5     Alkaline Phosphatase 12/22/2020 63     ALT 12/22/2020 13     AST 12/22/2020 16     Globulin 12/22/2020 2.4     WBC 12/22/2020 8.3     RBC 12/22/2020 4.19*    Hemoglobin 12/22/2020 14.5     Hematocrit 12/22/2020 43.8     MCV 12/22/2020 104.5*    MCH 12/22/2020 34.7*    MCHC 12/22/2020 33.2     RDW 12/22/2020 13.0     Platelets 31/67/6286 353     MPV 12/22/2020 7.2     Neutrophils % 12/22/2020 61.5     Lymphocytes % 12/22/2020 20.9     Monocytes % 12/22/2020 11.0     Eosinophils % 12/22/2020 5.7     Basophils % 12/22/2020 0.9     Neutrophils Absolute 12/22/2020 5.1     Lymphocytes Absolute 12/22/2020 1.7     Monocytes Absolute 12/22/2020 0.9     Eosinophils Absolute 12/22/2020 0.5     Basophils Absolute 12/22/2020 0.1     Iron 12/22/2020 118     TIBC 12/22/2020 305     Iron Saturation 12/22/2020 39     Vitamin B-12 12/22/2020 1020*    Folate 12/22/2020 >20.00          Assessment/Plan     1. Type 2 diabetes mellitus without complication, without long-term current use of insulin (HCC)  -Hemoglobin A1c of 6.5% shows diabetes is well controlled  -Continue diet control  -Limit carbohydrates to 45 grams with meals and 15 grams with snacks  -monitor blood sugars  -goal for blood sugar fasting or pre-meal  is   -goal for blood sugar 2 hours after a meal is less than 180  -goal for blood sugar at bedtime is less than 150  -Regular aerobic exercise  - POCT glycosylated hemoglobin (Hb A1C)  - Comprehensive Metabolic Panel; Future    2.  Essential hypertension  -stable  -Continue same medications  -Low sodium diet  -Regular aerobic exercise  - Comprehensive Metabolic Panel; Future    3. Major depression, single episode, [facility] complete [facility] ([facility]  -stable  -Continue same medications    4. Mixed hyperlipidemia  -stable  -Continue same medications  -Low fat, low cholesterol diet  -Regular aerobic exercise  - Lipid Panel; Future  - Comprehensive Metabolic Panel; Future    5. Nasal congestion  -Continue same medications    6. Prinzmetal angina (HCC)  -stable  -no chest pain  -continue care per Cardiology    7. Chronic systolic heart failure (HCC)  -stable  -Continue same medications  -Low sodium diet  -continue care per Cardiology      Discussed medications with patient, who voiced understanding of their use and indications. All questions answered. Return in about 6 months (around 12/22/2021) for Medicare AWV.

## 2021-06-28 PROBLEM — R09.81 NASAL CONGESTION: Status: ACTIVE | Noted: 2021-06-28

## 2021-06-28 ASSESSMENT — ENCOUNTER SYMPTOMS: BACK PAIN: 1

## 2021-07-12 DIAGNOSIS — I10 ESSENTIAL HYPERTENSION: ICD-10-CM

## 2021-07-12 DIAGNOSIS — E11.9 TYPE 2 DIABETES MELLITUS WITHOUT COMPLICATION, WITHOUT LONG-TERM CURRENT USE OF INSULIN (HCC): ICD-10-CM

## 2021-07-12 DIAGNOSIS — E78.2 MIXED HYPERLIPIDEMIA: ICD-10-CM

## 2021-07-12 LAB
A/G RATIO: 1.8 (ref 1.1–2.2)
ALBUMIN SERPL-MCNC: 4.6 G/DL (ref 3.4–5)
ALP BLD-CCNC: 62 U/L (ref 40–129)
ALT SERPL-CCNC: 12 U/L (ref 10–40)
ANION GAP SERPL CALCULATED.3IONS-SCNC: 15 MMOL/L (ref 3–16)
AST SERPL-CCNC: 16 U/L (ref 15–37)
BILIRUB SERPL-MCNC: 0.5 MG/DL (ref 0–1)
BUN BLDV-MCNC: 17 MG/DL (ref 7–20)
CALCIUM SERPL-MCNC: 9.6 MG/DL (ref 8.3–10.6)
CHLORIDE BLD-SCNC: 106 MMOL/L (ref 99–110)
CHOLESTEROL, TOTAL: 167 MG/DL (ref 0–199)
CO2: 24 MMOL/L (ref 21–32)
CREAT SERPL-MCNC: 1 MG/DL (ref 0.8–1.3)
GFR AFRICAN AMERICAN: >60
GFR NON-AFRICAN AMERICAN: >60
GLOBULIN: 2.5 G/DL
GLUCOSE BLD-MCNC: 112 MG/DL (ref 70–99)
HDLC SERPL-MCNC: 41 MG/DL (ref 40–60)
LDL CHOLESTEROL CALCULATED: 94 MG/DL
POTASSIUM SERPL-SCNC: 5.1 MMOL/L (ref 3.5–5.1)
SODIUM BLD-SCNC: 145 MMOL/L (ref 136–145)
TOTAL PROTEIN: 7.1 G/DL (ref 6.4–8.2)
TRIGL SERPL-MCNC: 161 MG/DL (ref 0–150)
VLDLC SERPL CALC-MCNC: 32 MG/DL

## 2021-09-20 ENCOUNTER — NURSE ONLY (OUTPATIENT)
Dept: PRIMARY CARE CLINIC | Age: 86
End: 2021-09-20
Payer: MEDICARE

## 2021-09-20 DIAGNOSIS — Z23 NEED FOR INFLUENZA VACCINATION: Primary | ICD-10-CM

## 2021-09-20 PROCEDURE — 90694 VACC AIIV4 NO PRSRV 0.5ML IM: CPT | Performed by: INTERNAL MEDICINE

## 2021-09-20 PROCEDURE — G0008 ADMIN INFLUENZA VIRUS VAC: HCPCS | Performed by: INTERNAL MEDICINE

## 2021-12-20 ENCOUNTER — OFFICE VISIT (OUTPATIENT)
Dept: PRIMARY CARE CLINIC | Age: 86
End: 2021-12-20
Payer: MEDICARE

## 2021-12-20 VITALS
WEIGHT: 197 LBS | SYSTOLIC BLOOD PRESSURE: 126 MMHG | HEIGHT: 72 IN | HEART RATE: 65 BPM | DIASTOLIC BLOOD PRESSURE: 84 MMHG | BODY MASS INDEX: 26.68 KG/M2 | RESPIRATION RATE: 16 BRPM | OXYGEN SATURATION: 95 % | TEMPERATURE: 97.2 F

## 2021-12-20 DIAGNOSIS — E11.9 TYPE 2 DIABETES MELLITUS WITHOUT COMPLICATION, WITHOUT LONG-TERM CURRENT USE OF INSULIN (HCC): ICD-10-CM

## 2021-12-20 DIAGNOSIS — E78.2 MIXED HYPERLIPIDEMIA: ICD-10-CM

## 2021-12-20 DIAGNOSIS — I10 ESSENTIAL HYPERTENSION: ICD-10-CM

## 2021-12-20 DIAGNOSIS — G56.03 BILATERAL CARPAL TUNNEL SYNDROME: ICD-10-CM

## 2021-12-20 DIAGNOSIS — G89.29 CHRONIC LOW BACK PAIN, UNSPECIFIED BACK PAIN LATERALITY, UNSPECIFIED WHETHER SCIATICA PRESENT: ICD-10-CM

## 2021-12-20 DIAGNOSIS — F32.5 MAJOR DEPRESSION, SINGLE EPISODE, IN COMPLETE REMISSION (HCC): ICD-10-CM

## 2021-12-20 DIAGNOSIS — Z00.00 ROUTINE GENERAL MEDICAL EXAMINATION AT A HEALTH CARE FACILITY: Primary | ICD-10-CM

## 2021-12-20 DIAGNOSIS — M54.50 CHRONIC LOW BACK PAIN, UNSPECIFIED BACK PAIN LATERALITY, UNSPECIFIED WHETHER SCIATICA PRESENT: ICD-10-CM

## 2021-12-20 LAB
CREATININE URINE: 72.4 MG/DL (ref 39–259)
HBA1C MFR BLD: 6.2 %
MICROALBUMIN UR-MCNC: <1.2 MG/DL
MICROALBUMIN/CREAT UR-RTO: NORMAL MG/G (ref 0–30)

## 2021-12-20 PROCEDURE — G0439 PPPS, SUBSEQ VISIT: HCPCS | Performed by: INTERNAL MEDICINE

## 2021-12-20 PROCEDURE — 83036 HEMOGLOBIN GLYCOSYLATED A1C: CPT | Performed by: INTERNAL MEDICINE

## 2021-12-20 ASSESSMENT — LIFESTYLE VARIABLES
HOW OFTEN DO YOU HAVE SIX OR MORE DRINKS ON ONE OCCASION: 0
AUDIT-C TOTAL SCORE: 1
HOW OFTEN DO YOU HAVE A DRINK CONTAINING ALCOHOL: 1
HOW OFTEN DURING THE LAST YEAR HAVE YOU FOUND THAT YOU WERE NOT ABLE TO STOP DRINKING ONCE YOU HAD STARTED: 0
HOW OFTEN DURING THE LAST YEAR HAVE YOU HAD A FEELING OF GUILT OR REMORSE AFTER DRINKING: 0
HAVE YOU OR SOMEONE ELSE BEEN INJURED AS A RESULT OF YOUR DRINKING: 0
AUDIT TOTAL SCORE: 1
HOW OFTEN DURING THE LAST YEAR HAVE YOU NEEDED AN ALCOHOLIC DRINK FIRST THING IN THE MORNING TO GET YOURSELF GOING AFTER A NIGHT OF HEAVY DRINKING: 0
HOW OFTEN DURING THE LAST YEAR HAVE YOU BEEN UNABLE TO REMEMBER WHAT HAPPENED THE NIGHT BEFORE BECAUSE YOU HAD BEEN DRINKING: 0
HOW OFTEN DURING THE LAST YEAR HAVE YOU FAILED TO DO WHAT WAS NORMALLY EXPECTED FROM YOU BECAUSE OF DRINKING: 0
HAS A RELATIVE, FRIEND, DOCTOR, OR ANOTHER HEALTH PROFESSIONAL EXPRESSED CONCERN ABOUT YOUR DRINKING OR SUGGESTED YOU CUT DOWN: 0
HOW MANY STANDARD DRINKS CONTAINING ALCOHOL DO YOU HAVE ON A TYPICAL DAY: 0

## 2021-12-20 ASSESSMENT — PATIENT HEALTH QUESTIONNAIRE - PHQ9
SUM OF ALL RESPONSES TO PHQ QUESTIONS 1-9: 0
SUM OF ALL RESPONSES TO PHQ QUESTIONS 1-9: 0
1. LITTLE INTEREST OR PLEASURE IN DOING THINGS: 0
SUM OF ALL RESPONSES TO PHQ9 QUESTIONS 1 & 2: 0
2. FEELING DOWN, DEPRESSED OR HOPELESS: 0
SUM OF ALL RESPONSES TO PHQ QUESTIONS 1-9: 0

## 2021-12-20 NOTE — PATIENT INSTRUCTIONS
Personalized Preventive Plan for Gloria Swift - 12/20/2021  Medicare offers a range of preventive health benefits. Some of the tests and screenings are paid in full while other may be subject to a deductible, co-insurance, and/or copay. Some of these benefits include a comprehensive review of your medical history including lifestyle, illnesses that may run in your family, and various assessments and screenings as appropriate. After reviewing your medical record and screening and assessments performed today your provider may have ordered immunizations, labs, imaging, and/or referrals for you. A list of these orders (if applicable) as well as your Preventive Care list are included within your After Visit Summary for your review. Other Preventive Recommendations:    · A preventive eye exam performed by an eye specialist is recommended every 1-2 years to screen for glaucoma; cataracts, macular degeneration, and other eye disorders. · A preventive dental visit is recommended every 6 months. · Try to get at least 150 minutes of exercise per week or 10,000 steps per day on a pedometer . · Order or download the FREE \"Exercise & Physical Activity: Your Everyday Guide\" from [employer]. Call 1-641.181.3402 or search [employer] online. · You need 9038-3351 mg of calcium and 4022-4091 IU of vitamin D per day. It is possible to meet your calcium requirement with diet alone, but a vitamin D supplement is usually necessary to meet this goal.  · When exposed to the sun, use a sunscreen that protects against both UVA and UVB radiation with an SPF of 30 or greater. Reapply every 2 to 3 hours or after sweating, drying off with a towel, or swimming. · Always wear a seat belt when traveling in a car. Always wear a helmet when riding a bicycle or motorcycle.

## 2021-12-20 NOTE — PROGRESS NOTES
Medicare Annual Wellness Visit  Name: Augustina Vickers Date: 2021   MRN: [id number] Sex: Male   Age: 82 y.o. Ethnicity: Non- / Non    : 1935 Race: White (non-)      Mayank Cerda is here for Medicare AWV    Screenings for behavioral, psychosocial and functional/safety risks, and cognitive dysfunction are all negative except as indicated below. These results, as well as other patient data from the [address] form, are documented in Flowsheets linked to this Encounter. Diabetes Mellitus Type 2:   Current Medications: None, patient is diet controlled  Diet : Patient decreases carbohydrates  Home blood sugar records: patient is not testing  Any episodes of hypoglycemia? no  Eye exam current (within one year): patient states his Diabetic eye exam is scheduled for 2022  Daily Aspirin? Yes 81mg once daily and Plavix 75mg once daily  Current exercise: walks at work and on feet alot     Hypertension:    Current Medications: Ramipril 2.5mg po q day and Metoprolol ER 25mg po bid  Home blood pressure monitoring: No.    Diet: low sodium     Hyperlipidemia:   Current medication: Simvastatin 20mg po qhs  Diet: low fat, low cholesterol     Depression:  Current medication: Sertraline 50mg po q day  Patient states his depression is ok and he is happy with the way things are and hesitant to change things.          Allergies   Allergen Reactions    Isosorbide Nitrate Other (See Comments)     headache    Nitroglycerin Other (See Comments)     headache    Meropenem Rash         Prior to Visit Medications    Medication Sig Taking?  Authorizing Provider   sertraline (ZOLOFT) 50 MG tablet TAKE 1 TABLET BY MOUTH EVERY DAY  Yes Kellie Jarrett MD   Multiple Vitamins-Minerals (PRESERVISION AREDS PO) Take by mouth Yes Historical Provider, MD   loratadine (CLARITIN) 10 MG tablet Take 1 tablet by mouth daily  Patient taking differently: Take 10 mg by mouth daily PRN Yes Moise Nyhan, MD   finasteride (PROSCAR) 5 MG tablet Take 5 mg by mouth daily Yes Historical Provider, MD   tamsulosin (FLOMAX) 0.4 MG capsule Take 0.4 mg by mouth daily Yes Historical Provider, MD   ramipril (ALTACE) 2.5 MG capsule Take 2.5 mg by mouth daily Yes Historical Provider, MD   acetaminophen (TYLENOL) 325 MG tablet Take 650 mg by mouth every 6 hours as needed for Pain Yes Historical Provider, MD   aspirin 81 MG tablet Take 81 mg by mouth daily. Yes Historical Provider, MD   metoprolol (TOPROL-XL) 25 MG XL tablet Take 25 mg by mouth 2 times daily  Yes Historical Provider, MD   clopidogrel (PLAVIX) 75 MG tablet Take 75 mg by mouth daily. Yes Historical Provider, MD   simvastatin (ZOCOR) 20 MG tablet Take 20 mg by mouth nightly. Yes Historical Provider, MD   Multiple Vitamins-Minerals (THERAPEUTIC MULTIVITAMIN-MINERALS) tablet Take 1 tablet by mouth daily. Yes Historical Provider, MD   fluticasone (FLONASE) 50 MCG/ACT nasal spray 2 sprays by Nasal route daily.  Yes Moise Nyhan, MD         Past Medical History:   Diagnosis Date    Aortic valve disease     Arthritis     BPH (benign prostatic hyperplasia)     Coronary artery vasospasm ([address]     Diverticulosis     H/O squamous cell carcinoma of skin     Hematuria 7/26/2017    HTN (hypertension)     Hyperlipidemia     Mixed hyperlipidemia 1/7/2016    Osteoarthritis of right knee     PSA elevation        Past Surgical History:   Procedure Laterality Date    APPENDECTOMY  1970    CARDIAC PACEMAKER PLACEMENT  12/2016    Edouard Fulton MD    COSMETIC SURGERY  2010    skin cancer - base of nose    HERNIA REPAIR  2011    JOINT REPLACEMENT  7/21/2015    Arnie Nix MD bilat knees    LAPAROTOMY  1970    exploratoryt for perf diverticulum    NERVE BLOCK Bilateral 1/6/2021    BILATERAL L3,L4,L5 DORSAL RAMUS MEDIAL BRANCH BLOCKS WITH FLUOROSCOPY performed by Brooke Robles MD at [facility] Bilateral 1/18/2021    BILATERAL L3,L4,L5 DORSAL RAMUS doesn't wear wrist supports. Patient states he has seen Dr. Real Hooks before but he retired. Patient states Dr. Real Hooks diagnosed him with carpal tunnel.      Hearing/Vision:  No exam data present  Hearing/Vision  Do you or your family notice any trouble with your hearing that hasn't been managed with hearing aids?: (!) Yes  Do you have difficulty driving, watching TV, or doing any of your daily activities because of your eyesight?: No  Have you had an eye exam within the past year?: Yes  Hearing/Vision Interventions:  · Hearing concerns:  patient declines any further evaluation/treatment for hearing issues      Personalized Preventive Plan   Current Health Maintenance Status  Immunization History   Administered Date(s) Administered    COVID-19, J&J, PF, 0.5 mL 03/09/2021, 10/30/2021    Influenza Virus Vaccine 10/15/2014    Influenza, High Dose (Fluzone 65 yrs and older) 10/15/2012, 10/12/2015, 10/10/2016, 10/20/2017    Influenza, Quadv, adjuvanted, 65 yrs +, IM, PF (Fluad) 09/12/2020, 09/20/2021    Influenza, Triv, inactivated, subunit, adjuvanted, IM (Fluad 65 yrs and older) 10/01/2018, 09/28/2019    Pneumococcal Conjugate 13-valent ([id number]) 07/05/2016    Pneumococcal Conjugate 7-valent (Prevnar7) 08/07/2014    Pneumococcal Conjugate Vaccine 06/22/2008    Pneumococcal Polysaccharide ([id number]) 06/22/2008, 06/29/2015    Tdap (Boostrix, Adacel) 07/02/2015    Zoster Live (Zostavax) 06/11/2015    Zoster Recombinant (Shingrix) 12/28/2018, 03/02/2019        Health Maintenance   Topic Date Due    Annual Wellness Visit (AWV)  12/22/2021    Lipid screen  07/12/2022    Potassium monitoring  07/12/2022    Creatinine monitoring  07/12/2022    DTaP/Tdap/Td vaccine (2 - Td or Tdap) 07/02/2025    Flu vaccine  Completed    Shingles Vaccine  Completed    Pneumococcal 65+ years Vaccine  Completed    COVID-19 Vaccine  Completed    Hepatitis A vaccine  Aged Out    Hib vaccine  Aged Out    Meningococcal (ACWY) vaccine  Aged Out     Recommendations for [employer] Due: see orders and patient instructions/AVS.  . Recommended screening schedule for the next 5-10 years is provided to the patient in written form: see Patient Instructions/AVS.      Lab Review   Results for POC orders placed in visit on 12/20/21   POCT glycosylated hemoglobin (Hb A1C)   Result Value Ref Range    Hemoglobin A1C 6.2 %       Orders Only on 07/12/2021   Component Date Value    Sodium 07/12/2021 145     Potassium 07/12/2021 5.1     Chloride 07/12/2021 106     CO2 07/12/2021 24     Anion Gap 07/12/2021 15     Glucose 07/12/2021 112*    BUN 07/12/2021 17     CREATININE 07/12/2021 1.0     GFR Non- 07/12/2021 >60     GFR  07/12/2021 >60     Calcium 07/12/2021 9.6     Total Protein 07/12/2021 7.1     Albumin 07/12/2021 4.6     Albumin/Globulin Ratio 07/12/2021 1.8     Total Bilirubin 07/12/2021 0.5     Alkaline Phosphatase 07/12/2021 62     ALT 07/12/2021 12     AST 07/12/2021 16     Globulin 07/12/2021 2.5     Cholesterol, Total 07/12/2021 167     Triglycerides 07/12/2021 161*    HDL 07/12/2021 41     LDL Calculated 07/12/2021 94     VLDL Cholesterol Calcula* 07/12/2021 32    Office Visit on 06/21/2021   Component Date Value    Hemoglobin A1C 06/21/2021 6.5        Chrissy Dsouza was seen today for medicare awv. Diagnoses and all orders for this visit:    1. Routine general medical examination at a health care facility  -Medicare AWV done    2.  Type 2 diabetes mellitus without complication, without long-term current use of insulin (HCC)  -Hemoglobin A1c of 6.2% shows diabetes is well controlled  -Continue diet control  -Limit carbohydrates to 45 grams with meals and 15 grams with snacks  -monitor blood sugars  -goal for blood sugar fasting or pre-meal  is   -goal for blood sugar 2 hours after a meal is less than 180  -goal for blood sugar at bedtime is less than 150  -Regular aerobic exercise  - POCT glycosylated hemoglobin (Hb A1C)  - MICROALBUMIN / CREATININE URINE RATIO  - Comprehensive Metabolic Panel; Future    3. Essential hypertension  -stable  -Continue same medications  -Low sodium diet  -Regular aerobic exercise  -Comprehensive Metabolic Panel; Future    4. Mixed hyperlipidemia  -stable  -Continue same medications  -Low fat, low cholesterol diet  -Regular aerobic exercise  - Comprehensive Metabolic Panel; Future  - Lipid Panel; Future    5. Major depression, single episode, in complete remission ([facility]  -stable  -Continue same medications    6. Chronic low back pain, unspecified back pain laterality, unspecified whether sciatica present  - External Referral To Spine Surgery    7. Bilateral carpal tunnel syndrome  -Referral to Mindi Srivastava MD, Hand Surgery (Hand, Wrist, Upper Extremity), [facility]      Return in 6 months (on 6/20/2022) for diabetes, hypertension, hyperlipidemia, and depression.

## 2021-12-28 PROBLEM — G89.29 CHRONIC LOW BACK PAIN: Status: ACTIVE | Noted: 2021-12-28

## 2021-12-28 PROBLEM — M54.50 CHRONIC LOW BACK PAIN: Status: ACTIVE | Noted: 2021-12-28

## 2022-01-03 DIAGNOSIS — I10 ESSENTIAL HYPERTENSION: ICD-10-CM

## 2022-01-03 DIAGNOSIS — E11.9 TYPE 2 DIABETES MELLITUS WITHOUT COMPLICATION, WITHOUT LONG-TERM CURRENT USE OF INSULIN (HCC): ICD-10-CM

## 2022-01-03 DIAGNOSIS — E78.2 MIXED HYPERLIPIDEMIA: ICD-10-CM

## 2022-01-03 LAB
A/G RATIO: 1.8 (ref 1.1–2.2)
ALBUMIN SERPL-MCNC: 4.4 G/DL (ref 3.4–5)
ALP BLD-CCNC: 62 U/L (ref 40–129)
ALT SERPL-CCNC: 13 U/L (ref 10–40)
ANION GAP SERPL CALCULATED.3IONS-SCNC: 12 MMOL/L (ref 3–16)
AST SERPL-CCNC: 21 U/L (ref 15–37)
BILIRUB SERPL-MCNC: 0.4 MG/DL (ref 0–1)
BUN BLDV-MCNC: 18 MG/DL (ref 7–20)
CALCIUM SERPL-MCNC: 9.3 MG/DL (ref 8.3–10.6)
CHLORIDE BLD-SCNC: 104 MMOL/L (ref 99–110)
CHOLESTEROL, TOTAL: 163 MG/DL (ref 0–199)
CO2: 27 MMOL/L (ref 21–32)
CREAT SERPL-MCNC: 1.1 MG/DL (ref 0.8–1.3)
GFR AFRICAN AMERICAN: >60
GFR NON-AFRICAN AMERICAN: >60
GLUCOSE BLD-MCNC: 116 MG/DL (ref 70–99)
HDLC SERPL-MCNC: 39 MG/DL (ref 40–60)
LDL CHOLESTEROL CALCULATED: 90 MG/DL
POTASSIUM SERPL-SCNC: 4.6 MMOL/L (ref 3.5–5.1)
SODIUM BLD-SCNC: 143 MMOL/L (ref 136–145)
TOTAL PROTEIN: 6.8 G/DL (ref 6.4–8.2)
TRIGL SERPL-MCNC: 168 MG/DL (ref 0–150)
VLDLC SERPL CALC-MCNC: 34 MG/DL

## 2022-06-27 ENCOUNTER — OFFICE VISIT (OUTPATIENT)
Dept: PRIMARY CARE CLINIC | Age: 87
End: 2022-06-27
Payer: MEDICARE

## 2022-06-27 VITALS
TEMPERATURE: 97.1 F | DIASTOLIC BLOOD PRESSURE: 74 MMHG | BODY MASS INDEX: 25.36 KG/M2 | RESPIRATION RATE: 16 BRPM | HEART RATE: 89 BPM | OXYGEN SATURATION: 96 % | SYSTOLIC BLOOD PRESSURE: 112 MMHG | WEIGHT: 187 LBS

## 2022-06-27 DIAGNOSIS — E78.2 MIXED HYPERLIPIDEMIA: ICD-10-CM

## 2022-06-27 DIAGNOSIS — I50.22 CHRONIC SYSTOLIC HEART FAILURE (HCC): ICD-10-CM

## 2022-06-27 DIAGNOSIS — I20.1 PRINZMETAL ANGINA (HCC): ICD-10-CM

## 2022-06-27 DIAGNOSIS — I10 ESSENTIAL HYPERTENSION: ICD-10-CM

## 2022-06-27 DIAGNOSIS — F32.5 MAJOR DEPRESSION, SINGLE EPISODE, IN COMPLETE REMISSION (HCC): ICD-10-CM

## 2022-06-27 DIAGNOSIS — E11.9 TYPE 2 DIABETES MELLITUS WITHOUT COMPLICATION, WITHOUT LONG-TERM CURRENT USE OF INSULIN (HCC): Primary | ICD-10-CM

## 2022-06-27 LAB — HBA1C MFR BLD: 6 %

## 2022-06-27 PROCEDURE — 83036 HEMOGLOBIN GLYCOSYLATED A1C: CPT | Performed by: INTERNAL MEDICINE

## 2022-06-27 PROCEDURE — 99214 OFFICE O/P EST MOD 30 MIN: CPT | Performed by: INTERNAL MEDICINE

## 2022-06-27 PROCEDURE — 3044F HG A1C LEVEL LT 7.0%: CPT | Performed by: INTERNAL MEDICINE

## 2022-06-27 PROCEDURE — 1123F ACP DISCUSS/DSCN MKR DOCD: CPT | Performed by: INTERNAL MEDICINE

## 2022-06-27 SDOH — ECONOMIC STABILITY: FOOD INSECURITY: WITHIN THE PAST 12 MONTHS, YOU WORRIED THAT YOUR FOOD WOULD RUN OUT BEFORE YOU GOT MONEY TO BUY MORE.: NEVER TRUE

## 2022-06-27 SDOH — ECONOMIC STABILITY: FOOD INSECURITY: WITHIN THE PAST 12 MONTHS, THE FOOD YOU BOUGHT JUST DIDN'T LAST AND YOU DIDN'T HAVE MONEY TO GET MORE.: NEVER TRUE

## 2022-06-27 ASSESSMENT — ENCOUNTER SYMPTOMS
RHINORRHEA: 0
COUGH: 0
ABDOMINAL PAIN: 0
NAUSEA: 0
SORE THROAT: 0
DIARRHEA: 0
CONSTIPATION: 0
WHEEZING: 0
SHORTNESS OF BREATH: 0
CHEST TIGHTNESS: 0
VOMITING: 0

## 2022-06-27 ASSESSMENT — PATIENT HEALTH QUESTIONNAIRE - PHQ9
SUM OF ALL RESPONSES TO PHQ9 QUESTIONS 1 & 2: 0
1. LITTLE INTEREST OR PLEASURE IN DOING THINGS: 0
SUM OF ALL RESPONSES TO PHQ QUESTIONS 1-9: 0
SUM OF ALL RESPONSES TO PHQ QUESTIONS 1-9: 0
10. IF YOU CHECKED OFF ANY PROBLEMS, HOW DIFFICULT HAVE THESE PROBLEMS MADE IT FOR YOU TO DO YOUR WORK, TAKE CARE OF THINGS AT HOME, OR GET ALONG WITH OTHER PEOPLE: 0
7. TROUBLE CONCENTRATING ON THINGS, SUCH AS READING THE NEWSPAPER OR WATCHING TELEVISION: 0
9. THOUGHTS THAT YOU WOULD BE BETTER OFF DEAD, OR OF HURTING YOURSELF: 0
4. FEELING TIRED OR HAVING LITTLE ENERGY: 0
3. TROUBLE FALLING OR STAYING ASLEEP: 0
SUM OF ALL RESPONSES TO PHQ QUESTIONS 1-9: 0
2. FEELING DOWN, DEPRESSED OR HOPELESS: 0
8. MOVING OR SPEAKING SO SLOWLY THAT OTHER PEOPLE COULD HAVE NOTICED. OR THE OPPOSITE, BEING SO FIGETY OR RESTLESS THAT YOU HAVE BEEN MOVING AROUND A LOT MORE THAN USUAL: 0
5. POOR APPETITE OR OVEREATING: 0
6. FEELING BAD ABOUT YOURSELF - OR THAT YOU ARE A FAILURE OR HAVE LET YOURSELF OR YOUR FAMILY DOWN: 0
SUM OF ALL RESPONSES TO PHQ QUESTIONS 1-9: 0

## 2022-06-27 ASSESSMENT — SOCIAL DETERMINANTS OF HEALTH (SDOH): HOW HARD IS IT FOR YOU TO PAY FOR THE VERY BASICS LIKE FOOD, HOUSING, MEDICAL CARE, AND HEATING?: NOT HARD AT ALL

## 2022-06-27 NOTE — PROGRESS NOTES
Pat Nunez   Date ofBirth:  1935    Date of Visit:  6/27/2022    Chief Complaint   Patient presents with    Diabetes    Hypertension    Cholesterol Problem    Depression       HPI  Diabetes Mellitus Type 2:   Current Medications: None, patient is diet controlled  Diet : Patient decreases carbohydrates  Home blood sugar records: patient monitors once in awhile and states it was 103 this morning  Any episodes of hypoglycemia? no  Eye exam current (within one year):yes  Daily Aspirin? Yes 81mg once daily and Plavix 75mg once daily  Current exercise:  patient states he walks a lot at work     Hypertension:    Current Medications: Ramipril 2.5mg once daily and Metoprolol ER 25mg 2 times daily   Home blood pressure monitoring: No.    Diet: low sodium     Hyperlipidemia:   Current medication: Simvastatin 20mg nightly  Diet: low fat, low cholesterol     Depression:  Current medication: Sertraline 50mg once daily  Patient states his depression is stable. Patient states he has moments of getting a little upset and low but overall is ok. Patient states such as the supreme court rulings on things. Patient states he is having a hard time coping with supreme court decisions. Patient states people shooting up everyone gets to him. Patient sees Cardiology for CHF and Prinzmetal angina. Patient is stable. Patient has seen Cardiology on 4/6/22. Review of Systems   Constitutional: Negative for activity change, appetite change, chills, fatigue, fever and unexpected weight change. HENT: Negative for congestion, postnasal drip, rhinorrhea and sore throat. Eyes: Negative for visual disturbance. Respiratory: Negative for cough, chest tightness, shortness of breath and wheezing. Cardiovascular: Negative for chest pain, palpitations and leg swelling. Gastrointestinal: Negative for abdominal pain, constipation, diarrhea, nausea and vomiting.    Genitourinary: Negative for dysuria, frequency, hematuria and urgency. Musculoskeletal: Negative for arthralgias, gait problem and myalgias. Skin: Negative for wound. Neurological: Negative for dizziness, syncope, weakness, light-headedness, numbness and headaches. Psychiatric/Behavioral: Positive for dysphoric mood. Negative for decreased concentration and sleep disturbance. The patient is not nervous/anxious. Allergies   Allergen Reactions    Isosorbide Nitrate Other (See Comments)     headache    Nitroglycerin Other (See Comments)     headache    Meropenem Rash     Outpatient Medications Marked as Taking for the 6/27/22 encounter (Office Visit) with Erik Roy MD   Medication Sig Dispense Refill    sertraline (ZOLOFT) 50 MG tablet TAKE 1 TABLET BY MOUTH EVERY DAY 30 tablet 5    Multiple Vitamins-Minerals (PRESERVISION AREDS PO) Take by mouth      loratadine (CLARITIN) 10 MG tablet Take 1 tablet by mouth daily (Patient taking differently: Take 10 mg by mouth daily PRN) 30 tablet 1    finasteride (PROSCAR) 5 MG tablet Take 5 mg by mouth daily      tamsulosin (FLOMAX) 0.4 MG capsule Take 0.4 mg by mouth daily      ramipril (ALTACE) 2.5 MG capsule Take 2.5 mg by mouth daily      acetaminophen (TYLENOL) 325 MG tablet Take 650 mg by mouth every 6 hours as needed for Pain      aspirin 81 MG tablet Take 81 mg by mouth daily.  metoprolol (TOPROL-XL) 25 MG XL tablet Take 25 mg by mouth 2 times daily       clopidogrel (PLAVIX) 75 MG tablet Take 75 mg by mouth daily.  simvastatin (ZOCOR) 20 MG tablet Take 20 mg by mouth nightly.  Multiple Vitamins-Minerals (THERAPEUTIC MULTIVITAMIN-MINERALS) tablet Take 1 tablet by mouth daily.  fluticasone (FLONASE) 50 MCG/ACT nasal spray 2 sprays by Nasal route daily. 1 Bottle 0         Vitals:    06/27/22 1332   BP: 112/74   Pulse: 89   Resp: 16   Temp: 97.1 °F (36.2 °C)   SpO2: 96%   Weight: 187 lb (84.8 kg)     Body mass index is 25.36 kg/m². Physical Exam  Nursing note reviewed. Constitutional:       General: He is not in acute distress. Appearance: Normal appearance. He is well-developed. Eyes:      General: Lids are normal.      Extraocular Movements: Extraocular movements intact. Conjunctiva/sclera: Conjunctivae normal.      Pupils: Pupils are equal, round, and reactive to light. Neck:      Thyroid: No thyromegaly. Vascular: No carotid bruit. Cardiovascular:      Rate and Rhythm: Normal rate and regular rhythm. Heart sounds: Normal heart sounds, S1 normal and S2 normal. No murmur heard. No friction rub. No gallop. Pulmonary:      Effort: Pulmonary effort is normal. No respiratory distress. Breath sounds: Normal breath sounds. No wheezing, rhonchi or rales. Abdominal:      General: Bowel sounds are normal. There is no distension. Palpations: Abdomen is soft. Tenderness: There is no abdominal tenderness. Musculoskeletal:      Cervical back: Neck supple. Right lower leg: No edema. Left lower leg: No edema. Lymphadenopathy:      Head:      Right side of head: No submandibular adenopathy. Left side of head: No submandibular adenopathy. Neurological:      Mental Status: He is alert.    Psychiatric:         Mood and Affect: Mood normal.           Results for POC orders placed in visit on 06/27/22   POCT glycosylated hemoglobin (Hb A1C)   Result Value Ref Range    Hemoglobin A1C 6.0 %     Lab Review   Orders Only on 01/03/2022   Component Date Value    Cholesterol, Total 01/03/2022 163     Triglycerides 01/03/2022 168*    HDL 01/03/2022 39*    LDL Calculated 01/03/2022 90     VLDL Cholesterol Calcula* 01/03/2022 34     Sodium 01/03/2022 143     Potassium 01/03/2022 4.6     Chloride 01/03/2022 104     CO2 01/03/2022 27     Anion Gap 01/03/2022 12     Glucose 01/03/2022 116*    BUN 01/03/2022 18     CREATININE 01/03/2022 1.1     GFR Non- 01/03/2022 >60     GFR  01/03/2022 >60     Calcium 01/03/2022 9.3     Total Protein 01/03/2022 6.8     Albumin 01/03/2022 4.4     Albumin/Globulin Ratio 01/03/2022 1.8     Total Bilirubin 01/03/2022 0.4     Alkaline Phosphatase 01/03/2022 62     ALT 01/03/2022 13     AST 01/03/2022 21          Assessment/Plan     1. Type 2 diabetes mellitus without complication, without long-term current use of insulin (HCC)  -Hemoglobin A1c of 6.0% shows diabetes is well controlled  -Continue diet control  -Limit carbohydrates to 45 grams with meals and 15 grams with snacks  -monitor blood sugars  -goal for blood sugar fasting or pre-meal  is   -goal for blood sugar 2 hours after a meal is less than 180  -goal for blood sugar at bedtime is less than 150  -Regular aerobic exercise  -POCT glycosylated hemoglobin (Hb A1C)  -Comprehensive Metabolic Panel; Future    2. Essential hypertension  -stable  -Continue Ramipril 2.5mg once daily   -Continue Metoprolol ER 25mg 2 times daily    -Low sodium diet  -Regular aerobic exercise  -Comprehensive Metabolic Panel; Future    3. Mixed hyperlipidemia  - stable  -Continue Simvastatin 20mg nightly  -Low fat, low cholesterol diet  -Regular aerobic exercise  - Comprehensive Metabolic Panel; Future  - Lipid Panel; Future    4. Major depression, single episode, in complete remission (HCC)  -stable  -Continue Sertraline 50mg once daily    5. Chronic systolic heart failure (HCC)  -stable  -managed by Cardiology    6. Prinzmetal angina ([facility] [address]  -stable  -managed by Cardiology      Discussed medications with patient, who voiced understanding of their use and indications. All questions answered. Return in about 6 months (around 12/21/2022) for Medicare AW.

## 2022-06-27 NOTE — PATIENT INSTRUCTIONS
-Fast 8-10 hours for labs  -Continue same medications  -Limit carbohydrates to 45 grams with meals and 15 grams with snacks  -Low fat, low cholesterol diet  -Low sodium diet  -monitor blood sugars  -goal for blood sugar fasting or pre-meal  is   -goal for blood sugar 2 hours after a meal is less than 180  -goal for blood sugar at bedtime is less than 150  -Regular aerobic exercise

## 2022-06-28 DIAGNOSIS — E78.2 MIXED HYPERLIPIDEMIA: ICD-10-CM

## 2022-06-28 DIAGNOSIS — E11.9 TYPE 2 DIABETES MELLITUS WITHOUT COMPLICATION, WITHOUT LONG-TERM CURRENT USE OF INSULIN (HCC): ICD-10-CM

## 2022-06-28 DIAGNOSIS — I10 ESSENTIAL HYPERTENSION: ICD-10-CM

## 2022-06-28 LAB
A/G RATIO: 2 (ref 1.1–2.2)
ALBUMIN SERPL-MCNC: 4.6 G/DL (ref 3.4–5)
ALP BLD-CCNC: 61 U/L (ref 40–129)
ALT SERPL-CCNC: 9 U/L (ref 10–40)
ANION GAP SERPL CALCULATED.3IONS-SCNC: 9 MMOL/L (ref 3–16)
AST SERPL-CCNC: 13 U/L (ref 15–37)
BILIRUB SERPL-MCNC: 0.3 MG/DL (ref 0–1)
BUN BLDV-MCNC: 18 MG/DL (ref 7–20)
CALCIUM SERPL-MCNC: 9.5 MG/DL (ref 8.3–10.6)
CHLORIDE BLD-SCNC: 102 MMOL/L (ref 99–110)
CHOLESTEROL, TOTAL: 157 MG/DL (ref 0–199)
CO2: 27 MMOL/L (ref 21–32)
CREAT SERPL-MCNC: 1 MG/DL (ref 0.8–1.3)
GFR AFRICAN AMERICAN: >60
GFR NON-AFRICAN AMERICAN: >60
GLUCOSE BLD-MCNC: 109 MG/DL (ref 70–99)
HDLC SERPL-MCNC: 43 MG/DL (ref 40–60)
LDL CHOLESTEROL CALCULATED: 96 MG/DL
POTASSIUM SERPL-SCNC: 4.5 MMOL/L (ref 3.5–5.1)
SODIUM BLD-SCNC: 138 MMOL/L (ref 136–145)
TOTAL PROTEIN: 6.9 G/DL (ref 6.4–8.2)
TRIGL SERPL-MCNC: 90 MG/DL (ref 0–150)
VLDLC SERPL CALC-MCNC: 18 MG/DL

## 2022-07-20 DIAGNOSIS — F32.5 MAJOR DEPRESSION, SINGLE EPISODE, IN COMPLETE REMISSION (HCC): ICD-10-CM

## 2022-07-20 NOTE — TELEPHONE ENCOUNTER
Medication:   Requested Prescriptions     Pending Prescriptions Disp Refills    sertraline (ZOLOFT) 50 MG tablet [Pharmacy Med Name: Sertraline HCl Oral Tablet 50 MG] 30 tablet 0     Sig: TAKE 1 TABLET BY MOUTH EVERY DAY        Last Filled:      Patient Phone Number: [phone] (home)     Last appt: 6/27/2022   Next appt: 12/19/2022    Last OARRS: No flowsheet data found. Preferred Pharmacy:   [facility], [address] [facility] [address] [facility]  [address]  Phone: [phone] Fax: [phone]    [address] [place] [address] [phone]  f [phone]  [address] [place] [address]  Phone: [phone] Fax: [phone]    Medication:   Requested Prescriptions     Pending Prescriptions Disp Refills    sertraline (ZOLOFT) 50 MG tablet [Pharmacy Med Name: Sertraline HCl Oral Tablet 50 MG] 30 tablet 0     Sig: TAKE 1 TABLET BY MOUTH EVERY DAY        Last Filled:      Patient Phone Number: [phone] (home)     Last appt: 6/27/2022   Next appt: 12/19/2022    Last OARRS: No flowsheet data found.     Preferred Pharmacy:   [facility] Mani Roland, [address] [facility] [address] [place] [address]  [address]  Phone: [phone] Fax: [phone] [place] [address] [phone] - f [phone]  [address] [place] [address]  Phone: [phone] Fax: [phone]

## 2022-12-06 LAB
AVERAGE GLUCOSE: NORMAL
HBA1C MFR BLD: 6.2 %

## 2022-12-22 ENCOUNTER — OFFICE VISIT (OUTPATIENT)
Dept: PRIMARY CARE CLINIC | Age: 87
End: 2022-12-22

## 2022-12-22 VITALS
BODY MASS INDEX: 24.38 KG/M2 | WEIGHT: 180 LBS | OXYGEN SATURATION: 94 % | HEART RATE: 65 BPM | HEIGHT: 72 IN | SYSTOLIC BLOOD PRESSURE: 104 MMHG | RESPIRATION RATE: 16 BRPM | DIASTOLIC BLOOD PRESSURE: 62 MMHG | TEMPERATURE: 98 F

## 2022-12-22 DIAGNOSIS — E11.9 TYPE 2 DIABETES MELLITUS WITHOUT COMPLICATION, WITHOUT LONG-TERM CURRENT USE OF INSULIN (HCC): ICD-10-CM

## 2022-12-22 DIAGNOSIS — E78.2 MIXED HYPERLIPIDEMIA: ICD-10-CM

## 2022-12-22 DIAGNOSIS — Z00.00 MEDICARE ANNUAL WELLNESS VISIT, SUBSEQUENT: Primary | ICD-10-CM

## 2022-12-22 DIAGNOSIS — F32.5 MAJOR DEPRESSION, SINGLE EPISODE, IN COMPLETE REMISSION (HCC): ICD-10-CM

## 2022-12-22 DIAGNOSIS — I10 ESSENTIAL HYPERTENSION: ICD-10-CM

## 2022-12-22 RX ORDER — ATORVASTATIN CALCIUM 40 MG/1
40 TABLET, FILM COATED ORAL NIGHTLY
COMMUNITY
Start: 2022-12-01

## 2022-12-22 ASSESSMENT — PATIENT HEALTH QUESTIONNAIRE - PHQ9
6. FEELING BAD ABOUT YOURSELF - OR THAT YOU ARE A FAILURE OR HAVE LET YOURSELF OR YOUR FAMILY DOWN: 0
8. MOVING OR SPEAKING SO SLOWLY THAT OTHER PEOPLE COULD HAVE NOTICED. OR THE OPPOSITE, BEING SO FIGETY OR RESTLESS THAT YOU HAVE BEEN MOVING AROUND A LOT MORE THAN USUAL: 0
3. TROUBLE FALLING OR STAYING ASLEEP: 0
2. FEELING DOWN, DEPRESSED OR HOPELESS: 0
SUM OF ALL RESPONSES TO PHQ9 QUESTIONS 1 & 2: 1
SUM OF ALL RESPONSES TO PHQ QUESTIONS 1-9: 1
10. IF YOU CHECKED OFF ANY PROBLEMS, HOW DIFFICULT HAVE THESE PROBLEMS MADE IT FOR YOU TO DO YOUR WORK, TAKE CARE OF THINGS AT HOME, OR GET ALONG WITH OTHER PEOPLE: 0
1. LITTLE INTEREST OR PLEASURE IN DOING THINGS: 1
7. TROUBLE CONCENTRATING ON THINGS, SUCH AS READING THE NEWSPAPER OR WATCHING TELEVISION: 0
SUM OF ALL RESPONSES TO PHQ QUESTIONS 1-9: 1
SUM OF ALL RESPONSES TO PHQ QUESTIONS 1-9: 1
5. POOR APPETITE OR OVEREATING: 0
SUM OF ALL RESPONSES TO PHQ QUESTIONS 1-9: 1
4. FEELING TIRED OR HAVING LITTLE ENERGY: 0
9. THOUGHTS THAT YOU WOULD BE BETTER OFF DEAD, OR OF HURTING YOURSELF: 0

## 2022-12-22 ASSESSMENT — LIFESTYLE VARIABLES
HOW MANY STANDARD DRINKS CONTAINING ALCOHOL DO YOU HAVE ON A TYPICAL DAY: 1 OR 2
HOW OFTEN DO YOU HAVE A DRINK CONTAINING ALCOHOL: MONTHLY OR LESS

## 2022-12-22 NOTE — PATIENT INSTRUCTIONS
Fatigue: Care Instructions  Your Care Instructions     Fatigue is a feeling of tiredness, exhaustion, or lack of energy. You may feel fatigue because of too much or not enough activity. It can also come from stress, lack of sleep, boredom, and poor diet. Many medical problems, such as viral infections, can cause fatigue. Emotional problems, especially depression, are often the cause of fatigue. Fatigue is most often a symptom of another problem. Treatment for fatigue depends on the cause. For example, if you have fatigue because you have a certain health problem, treating this problem also treats your fatigue. If depression or anxiety is the cause, treatment may help. Follow-up care is a key part of your treatment and safety. Be sure to make and go to all appointments, and call your doctor if you are having problems. It's also a good idea to know your test results and keep a list of the medicines you take. How can you care for yourself at home? Get regular exercise. But don't overdo it. Go back and forth between rest and exercise. Get plenty of rest.  Eat a healthy diet. Do not skip meals, especially breakfast.  Reduce your use of caffeine, tobacco, and alcohol. Caffeine is most often found in coffee, tea, cola drinks, and chocolate. Limit medicines that can cause fatigue. This includes tranquilizers and cold and allergy medicines. When should you call for help? Watch closely for changes in your health, and be sure to contact your doctor if:    You have new symptoms such as fever or a rash.     Your fatigue gets worse.     You have been feeling down, depressed, or hopeless. Or you may have lost interest in things that you usually enjoy.     You are not getting better as expected. Where can you learn more? Go to http://www.woods.com/ and enter M348 to learn more about \"Fatigue: Care Instructions. \"  Current as of: February 9, 2022               Content Version: 13.5  © 1379-8612 Healthwise, Incorporated. Care instructions adapted under license by [facility] ([facility]). If you have questions about a medical condition or this instruction, always ask your healthcare professional. [address] any warranty or liability for your use of this information. Advance Directives: Care Instructions  Overview  An advance directive is a legal way to state your wishes at the end of your life. It tells your family and your doctor what to do if you can't say what you want. There are two main types of advance directives. You can change them any time your wishes change. Living will. This form tells your family and your doctor your wishes about life support and other treatment. The form is also called a declaration. Medical power of . This form lets you name a person to make treatment decisions for you when you can't speak for yourself. This person is called a health care agent (health care proxy, health care surrogate). The form is also called a durable power of  for health care. If you do not have an advance directive, decisions about your medical care may be made by a family member, or by a doctor or a  who doesn't know you. It may help to think of an advance directive as a gift to the people who care for you. If you have one, they won't have to make tough decisions by themselves. For more information, including forms for your state, see the [address] website (www.caringinfo.org/planning/advance-directives/). Follow-up care is a key part of your treatment and safety. Be sure to make and go to all appointments, and call your doctor if you are having problems. It's also a good idea to know your test results and keep a list of the medicines you take. What should you include in an advance directive? Many states have a unique advance directive form.  (It may ask you to address specific issues.) Or you might use a universal form that's approved by many states. If your form doesn't tell you what to address, it may be hard to know what to include in your advance directive. Use the questions below to help you get started. · Who do you want to make decisions about your medical care if you are not able to? · What life-support measures do you want if you have a serious illness that gets worse over time or can't be cured? · What are you most afraid of that might happen? (Maybe you're afraid of having pain, losing your independence, or being kept alive by machines.)  · Where would you prefer to die? (Your home? A hospital? A nursing home?)  · Do you want to donate your organs when you die? · Do you want certain Rastafari practices performed before you die? When should you call for help? Be sure to contact your doctor if you have any questions. Where can you learn more? Go to http://www.ricardo.com/ and enter R264 to learn more about \"Advance Directives: Care Instructions. \"  Current as of: June 16, 2022               Content Version: 13.5  © 6992-7307 Healthwise, Incorporated. Care instructions adapted under license by [facility] ([facility]). If you have questions about a medical condition or this instruction, always ask your healthcare professional. [address] any warranty or liability for your use of this information. Personalized Preventive Plan for Trina Hope - 12/22/2022  Medicare offers a range of preventive health benefits. Some of the tests and screenings are paid in full while other may be subject to a deductible, co-insurance, and/or copay. Some of these benefits include a comprehensive review of your medical history including lifestyle, illnesses that may run in your family, and various assessments and screenings as appropriate. After reviewing your medical record and screening and assessments performed today your provider may have ordered immunizations, labs, imaging, and/or referrals for you.   A list of these orders (if applicable) as well as your Preventive Care list are included within your After Visit Summary for your review. Other Preventive Recommendations:    A preventive eye exam performed by an eye specialist is recommended every 1-2 years to screen for glaucoma; cataracts, macular degeneration, and other eye disorders. A preventive dental visit is recommended every 6 months. Try to get at least 150 minutes of exercise per week or 10,000 steps per day on a pedometer . Order or download the FREE \"Exercise & Physical Activity: Your Everyday Guide\" from [employer]. Call 5-247.735.4962 or search [employer] online. You need 8047-2918 mg of calcium and 9887-8911 IU of vitamin D per day. It is possible to meet your calcium requirement with diet alone, but a vitamin D supplement is usually necessary to meet this goal.  When exposed to the sun, use a sunscreen that protects against both UVA and UVB radiation with an SPF of 30 or greater. Reapply every 2 to 3 hours or after sweating, drying off with a towel, or swimming. Always wear a seat belt when traveling in a car. Always wear a helmet when riding a bicycle or motorcycle.

## 2022-12-22 NOTE — PROGRESS NOTES
Medicare Annual Wellness Visit    Rhina Mcdonald is here for Medicare AWV    Assessment & Plan   1. Medicare annual wellness visit, subsequent  -Medicare AWV done    2. Type 2 diabetes mellitus without complication, without long-term current use of insulin (HCC)  -Hemoglobin A1c of 6.2% shows diabetes is well controlled  -Continue diet control  -Limit carbohydrates to 45 grams with meals and 15 grams with snacks  -monitor blood sugars  -goal for blood sugar fasting or pre-meal  is   -goal for blood sugar 2 hours after a meal is less than 180  -goal for blood sugar at bedtime is less than 150    3. Essential hypertension  -stable  -Continue Ramipril 2.5mg once daily   -Continue Metoprolol ER 25mg 2 times daily    -Low sodium diet    4. Mixed hyperlipidemia  - stable  -Continue Atorvastatin 40mg nightly  -Low fat, low cholesterol diet    5. Major depression, single episode, in complete remission (HCC)  -stable   -Continue Sertraline 50mg once daily              Recommendations for Preventive Services Due: see orders and patient instructions/AVS.  Recommended screening schedule for the next 5-10 years is provided to the patient in written form: see Patient Instructions/AVS.           Return in 6 months (on 6/22/2023) for diabetes, hypertension, hyperlipidemia, and depression. Subjective   The following acute and/or chronic problems were also addressed today:    Diabetes Mellitus Type 2:   Current Medications: None, patient is diet controlled  Diet : Patient decreases carbohydrates  Home blood sugar records:  checks sugar once in awhile and never more than 100-110  Any episodes of hypoglycemia? no  Eye exam current (within one year):yes  Daily Aspirin?  Yes 81mg once daily and Plavix 75mg once daily  Current exercise: none    Hypertension:    Current Medications: Ramipril 2.5mg once daily and Metoprolol ER 25mg 2 times daily   Home blood pressure monitoring: No.    Diet: low sodium     Hyperlipidemia: Current medication: Atorvastatin 40mg nightly  Simvastatin was changed to Atorvastatin  Diet: rarely eats fried foods    Depression:  Current medication: Sertraline 50mg once daily  Patient states he doesn't know if he feels depressed and states he feels like he is not getting old in his mind but feels like he is getting old everywhere else. Patient states he doesn't want to do things around the house. Feels frustrated because he can't do things he used to be able to do. 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 Patient is on short term disability until 1/7/22. Patient's complete Health Risk Assessment and screening values have been reviewed and are found in Flowsheets. The following problems were reviewed today and where indicated follow up appointments were made and/or referrals ordered. Positive Risk Factor Screenings with Interventions:               General HRA Questions:  Select all that apply: (!) New or Increased Pain, New or Increased Fatigue    Pain Interventions:  Patient comments: little pain since valve replacement    Fatigue Interventions:  Patient comments: fatigue since heart valve replacement. Advanced Directives:  Do you have a Living Will?: (!) No    Intervention:  has NO advanced directive - information provided                       Objective   Vitals:    12/22/22 1410   BP: 104/62   Pulse: 65   Resp: 16   Temp: 98 °F (36.7 °C)   SpO2: 94%   Weight: 180 lb (81.6 kg)   Height: 6' (1.829 m)      Body mass index is 24.41 kg/m².       General Appearance: alert and oriented to person, place and time, well-developed and well-nourished, in no acute distress  Head: normocephalic and atraumatic  Eyes: pupils equal, round, and reactive to light, extraocular eye movements intact, conjunctivae normal  ENT: tympanic membrane, external ear and ear canal normal bilaterally, oropharynx clear and moist with normal mucous membranes  Neck: neck supple and non tender without mass, no thyromegaly or thyroid nodules, no cervical lymphadenopathy   Pulmonary/Chest: clear to auscultation bilaterally- no wheezes, rales or rhonchi, normal air movement, no respiratory distress  Cardiovascular: normal rate, regular rhythm, normal S1 and S2, no murmurs, and no carotid bruits  Abdomen: soft, non-tender, non-distended, normal bowel sounds, no masses or organomegaly  Extremities: no edema  Neurologic: gait and coordination normal and speech normal       Allergies   Allergen Reactions    Isosorbide Nitrate Other (See Comments)     headache    Nitroglycerin Other (See Comments)     headache    Meropenem Rash     Prior to Visit Medications    Medication Sig Taking? Authorizing Provider   atorvastatin (LIPITOR) 40 MG tablet Take 40 mg by mouth nightly Yes Historical Provider, MD   sertraline (ZOLOFT) 50 MG tablet TAKE 1 TABLET BY MOUTH EVERY DAY Yes Hetty Lesch, MD   Multiple Vitamins-Minerals (PRESERVISION AREDS PO) Take by mouth Yes Historical Provider, MD   loratadine (CLARITIN) 10 MG tablet Take 1 tablet by mouth daily  Patient taking differently: Take 10 mg by mouth daily PRN Yes Hetty Lesch, MD   finasteride (PROSCAR) 5 MG tablet Take 5 mg by mouth daily Yes Historical Provider, MD   tamsulosin (FLOMAX) 0.4 MG capsule Take 0.4 mg by mouth daily Yes Historical Provider, MD   ramipril (ALTACE) 2.5 MG capsule Take 2.5 mg by mouth daily Yes Historical Provider, MD   acetaminophen (TYLENOL) 325 MG tablet Take 650 mg by mouth every 6 hours as needed for Pain Yes Historical Provider, MD   aspirin 81 MG tablet Take 81 mg by mouth daily. Yes Historical Provider, MD   metoprolol (TOPROL-XL) 25 MG XL tablet Take 25 mg by mouth 2 times daily  Yes Historical Provider, MD   clopidogrel (PLAVIX) 75 MG tablet Take 75 mg by mouth daily. Yes Historical Provider, MD   Multiple Vitamins-Minerals (THERAPEUTIC MULTIVITAMIN-MINERALS) tablet Take 1 tablet by mouth daily.  Yes Historical Provider, MD   fluticasone (FLONASE) 50 MCG/ACT nasal spray 2 sprays by Nasal route daily.  Yes Ina Batista MD       Hemoglobin A1C   Date Value Ref Range Status   12/06/2022 6.2 % Final     Lab Results   Component Value Date    CHOL 147 10/31/2022    TRIG 84 10/31/2022    HDL 41 (L) 10/31/2022    [address] 89 10/31/2022       CareTeam (Including outside providers/suppliers regularly involved in providing care):   Patient Care Team:  Ina Batista MD as PCP - General (Internal Medicine)  Ina Batista MD [facility]  Griffin Matos MD as Consulting Physician (Cardiology)  Pedro Adams MD as Consulting Physician (Medical Oncology)  Sharmila Perdomo as Consulting Physician (Orthopedic Surgery)     Reviewed and updated this visit:  Tobacco  Allergies  Meds  Med Hx  Surg Hx  Soc Hx  Fam Hx

## 2023-01-16 DIAGNOSIS — F32.5 MAJOR DEPRESSION, SINGLE EPISODE, IN COMPLETE REMISSION (HCC): ICD-10-CM

## 2023-01-16 NOTE — TELEPHONE ENCOUNTER
Medication:   Requested Prescriptions     Pending Prescriptions Disp Refills    sertraline (ZOLOFT) 50 MG tablet [Pharmacy Med Name: Sertraline HCl Oral Tablet 50 MG] 30 tablet 0     Sig: TAKE 1 TABLET BY MOUTH EVERY DAY     Last Filled:  7/20/22    Last appt: 12/22/2022   Next appt: 6/26/2023    Last OARRS: No flowsheet data found.

## 2023-03-21 ENCOUNTER — TELEPHONE (OUTPATIENT)
Dept: PRIMARY CARE CLINIC | Age: 88
End: 2023-03-21

## 2023-05-11 ENCOUNTER — TELEPHONE (OUTPATIENT)
Dept: PRIMARY CARE CLINIC | Age: 88
End: 2023-05-11

## 2023-06-26 ENCOUNTER — OFFICE VISIT (OUTPATIENT)
Dept: PRIMARY CARE CLINIC | Age: 88
End: 2023-06-26

## 2023-06-26 VITALS
TEMPERATURE: 97.8 F | OXYGEN SATURATION: 95 % | DIASTOLIC BLOOD PRESSURE: 60 MMHG | BODY MASS INDEX: 22.08 KG/M2 | HEART RATE: 63 BPM | WEIGHT: 163 LBS | RESPIRATION RATE: 16 BRPM | HEIGHT: 72 IN | SYSTOLIC BLOOD PRESSURE: 102 MMHG

## 2023-06-26 DIAGNOSIS — I20.1 PRINZMETAL ANGINA (HCC): ICD-10-CM

## 2023-06-26 DIAGNOSIS — M25.512 LEFT SHOULDER PAIN, UNSPECIFIED CHRONICITY: ICD-10-CM

## 2023-06-26 DIAGNOSIS — R63.4 WEIGHT LOSS: ICD-10-CM

## 2023-06-26 DIAGNOSIS — E78.2 MIXED HYPERLIPIDEMIA: ICD-10-CM

## 2023-06-26 DIAGNOSIS — F32.5 MAJOR DEPRESSION, SINGLE EPISODE, IN COMPLETE REMISSION (HCC): ICD-10-CM

## 2023-06-26 DIAGNOSIS — I50.22 CHRONIC SYSTOLIC HEART FAILURE (HCC): ICD-10-CM

## 2023-06-26 DIAGNOSIS — I10 ESSENTIAL HYPERTENSION: ICD-10-CM

## 2023-06-26 DIAGNOSIS — E11.9 TYPE 2 DIABETES MELLITUS WITHOUT COMPLICATION, WITHOUT LONG-TERM CURRENT USE OF INSULIN (HCC): Primary | ICD-10-CM

## 2023-06-26 DIAGNOSIS — D64.9 ANEMIA, UNSPECIFIED TYPE: ICD-10-CM

## 2023-06-26 LAB — HBA1C MFR BLD: 5.6 %

## 2023-06-26 SDOH — ECONOMIC STABILITY: INCOME INSECURITY: HOW HARD IS IT FOR YOU TO PAY FOR THE VERY BASICS LIKE FOOD, HOUSING, MEDICAL CARE, AND HEATING?: NOT HARD AT ALL

## 2023-06-26 SDOH — ECONOMIC STABILITY: FOOD INSECURITY: WITHIN THE PAST 12 MONTHS, YOU WORRIED THAT YOUR FOOD WOULD RUN OUT BEFORE YOU GOT MONEY TO BUY MORE.: NEVER TRUE

## 2023-06-26 SDOH — ECONOMIC STABILITY: FOOD INSECURITY: WITHIN THE PAST 12 MONTHS, THE FOOD YOU BOUGHT JUST DIDN'T LAST AND YOU DIDN'T HAVE MONEY TO GET MORE.: NEVER TRUE

## 2023-06-26 SDOH — ECONOMIC STABILITY: HOUSING INSECURITY
IN THE LAST 12 MONTHS, WAS THERE A TIME WHEN YOU DID NOT HAVE A STEADY PLACE TO SLEEP OR SLEPT IN A SHELTER (INCLUDING NOW)?: NO

## 2023-06-26 ASSESSMENT — PATIENT HEALTH QUESTIONNAIRE - PHQ9
1. LITTLE INTEREST OR PLEASURE IN DOING THINGS: 0
10. IF YOU CHECKED OFF ANY PROBLEMS, HOW DIFFICULT HAVE THESE PROBLEMS MADE IT FOR YOU TO DO YOUR WORK, TAKE CARE OF THINGS AT HOME, OR GET ALONG WITH OTHER PEOPLE: 0
3. TROUBLE FALLING OR STAYING ASLEEP: 0
6. FEELING BAD ABOUT YOURSELF - OR THAT YOU ARE A FAILURE OR HAVE LET YOURSELF OR YOUR FAMILY DOWN: 0
SUM OF ALL RESPONSES TO PHQ QUESTIONS 1-9: 0
SUM OF ALL RESPONSES TO PHQ9 QUESTIONS 1 & 2: 0
2. FEELING DOWN, DEPRESSED OR HOPELESS: 0
7. TROUBLE CONCENTRATING ON THINGS, SUCH AS READING THE NEWSPAPER OR WATCHING TELEVISION: 0
SUM OF ALL RESPONSES TO PHQ QUESTIONS 1-9: 0
8. MOVING OR SPEAKING SO SLOWLY THAT OTHER PEOPLE COULD HAVE NOTICED. OR THE OPPOSITE, BEING SO FIGETY OR RESTLESS THAT YOU HAVE BEEN MOVING AROUND A LOT MORE THAN USUAL: 0
9. THOUGHTS THAT YOU WOULD BE BETTER OFF DEAD, OR OF HURTING YOURSELF: 0
4. FEELING TIRED OR HAVING LITTLE ENERGY: 0
SUM OF ALL RESPONSES TO PHQ QUESTIONS 1-9: 0
SUM OF ALL RESPONSES TO PHQ QUESTIONS 1-9: 0
5. POOR APPETITE OR OVEREATING: 0

## 2023-07-02 PROBLEM — M25.512 LEFT SHOULDER PAIN: Status: ACTIVE | Noted: 2023-07-02

## 2023-07-02 ASSESSMENT — ENCOUNTER SYMPTOMS
SORE THROAT: 0
VOMITING: 0
COUGH: 0
ABDOMINAL PAIN: 0
CHEST TIGHTNESS: 0
WHEEZING: 0
CONSTIPATION: 0
SHORTNESS OF BREATH: 0
RHINORRHEA: 0
NAUSEA: 0
DIARRHEA: 0

## 2023-07-20 ENCOUNTER — TELEPHONE (OUTPATIENT)
Dept: PRIMARY CARE CLINIC | Age: 88
End: 2023-07-20

## 2023-08-02 DIAGNOSIS — D64.9 ANEMIA, UNSPECIFIED TYPE: ICD-10-CM

## 2023-08-02 DIAGNOSIS — R63.4 WEIGHT LOSS: ICD-10-CM

## 2023-08-02 DIAGNOSIS — E11.9 TYPE 2 DIABETES MELLITUS WITHOUT COMPLICATION, WITHOUT LONG-TERM CURRENT USE OF INSULIN (HCC): ICD-10-CM

## 2023-08-02 DIAGNOSIS — E78.2 MIXED HYPERLIPIDEMIA: ICD-10-CM

## 2023-08-02 DIAGNOSIS — I10 ESSENTIAL HYPERTENSION: ICD-10-CM

## 2023-08-02 LAB
ALBUMIN SERPL-MCNC: 4.3 G/DL (ref 3.4–5)
ALBUMIN/GLOB SERPL: 1.7 {RATIO} (ref 1.1–2.2)
ALP SERPL-CCNC: 146 U/L (ref 40–129)
ALT SERPL-CCNC: 10 U/L (ref 10–40)
ANION GAP SERPL CALCULATED.3IONS-SCNC: 12 MMOL/L (ref 3–16)
AST SERPL-CCNC: 14 U/L (ref 15–37)
BASOPHILS # BLD: 0.1 K/UL (ref 0–0.2)
BASOPHILS NFR BLD: 1 %
BILIRUB SERPL-MCNC: 0.4 MG/DL (ref 0–1)
BUN SERPL-MCNC: 20 MG/DL (ref 7–20)
CALCIUM SERPL-MCNC: 9.3 MG/DL (ref 8.3–10.6)
CHLORIDE SERPL-SCNC: 105 MMOL/L (ref 99–110)
CHOLEST SERPL-MCNC: 147 MG/DL (ref 0–199)
CO2 SERPL-SCNC: 23 MMOL/L (ref 21–32)
CREAT SERPL-MCNC: 1 MG/DL (ref 0.8–1.3)
DEPRECATED RDW RBC AUTO: 14.3 % (ref 12.4–15.4)
EOSINOPHIL # BLD: 0.6 K/UL (ref 0–0.6)
EOSINOPHIL NFR BLD: 6.6 %
GFR SERPLBLD CREATININE-BSD FMLA CKD-EPI: >60 ML/MIN/{1.73_M2}
GLUCOSE SERPL-MCNC: 106 MG/DL (ref 70–99)
HCT VFR BLD AUTO: 38.6 % (ref 40.5–52.5)
HDLC SERPL-MCNC: 46 MG/DL (ref 40–60)
HGB BLD-MCNC: 12.9 G/DL (ref 13.5–17.5)
IRON SATN MFR SERPL: 15 % (ref 20–50)
IRON SERPL-MCNC: 41 UG/DL (ref 59–158)
LDLC SERPL CALC-MCNC: 81 MG/DL
LYMPHOCYTES # BLD: 1.7 K/UL (ref 1–5.1)
LYMPHOCYTES NFR BLD: 20.6 %
MCH RBC QN AUTO: 33.9 PG (ref 26–34)
MCHC RBC AUTO-ENTMCNC: 33.4 G/DL (ref 31–36)
MCV RBC AUTO: 101.5 FL (ref 80–100)
MONOCYTES # BLD: 1 K/UL (ref 0–1.3)
MONOCYTES NFR BLD: 11.7 %
NEUTROPHILS # BLD: 5.1 K/UL (ref 1.7–7.7)
NEUTROPHILS NFR BLD: 60.1 %
PLATELET # BLD AUTO: 437 K/UL (ref 135–450)
PMV BLD AUTO: 7.1 FL (ref 5–10.5)
POTASSIUM SERPL-SCNC: 4.2 MMOL/L (ref 3.5–5.1)
PROT SERPL-MCNC: 6.9 G/DL (ref 6.4–8.2)
RBC # BLD AUTO: 3.8 M/UL (ref 4.2–5.9)
SODIUM SERPL-SCNC: 140 MMOL/L (ref 136–145)
TIBC SERPL-MCNC: 273 UG/DL (ref 260–445)
TRIGL SERPL-MCNC: 99 MG/DL (ref 0–150)
TSH SERPL DL<=0.005 MIU/L-ACNC: 2.64 UIU/ML (ref 0.27–4.2)
VLDLC SERPL CALC-MCNC: 20 MG/DL
WBC # BLD AUTO: 8.5 K/UL (ref 4–11)

## 2023-08-07 ENCOUNTER — OFFICE VISIT (OUTPATIENT)
Dept: PRIMARY CARE CLINIC | Age: 88
End: 2023-08-07

## 2023-08-07 VITALS
OXYGEN SATURATION: 94 % | DIASTOLIC BLOOD PRESSURE: 68 MMHG | BODY MASS INDEX: 22.24 KG/M2 | WEIGHT: 164 LBS | HEART RATE: 55 BPM | SYSTOLIC BLOOD PRESSURE: 104 MMHG

## 2023-08-07 DIAGNOSIS — R63.4 WEIGHT LOSS: Primary | ICD-10-CM

## 2023-08-07 DIAGNOSIS — D50.9 IRON DEFICIENCY ANEMIA, UNSPECIFIED IRON DEFICIENCY ANEMIA TYPE: ICD-10-CM

## 2023-08-07 DIAGNOSIS — R74.8 ELEVATED ALKALINE PHOSPHATASE LEVEL: ICD-10-CM

## 2023-08-07 RX ORDER — FERROUS SULFATE 325(65) MG
325 TABLET ORAL
Qty: 30 TABLET | Refills: 1 | Status: SHIPPED | OUTPATIENT
Start: 2023-08-07

## 2023-08-07 NOTE — PROGRESS NOTES
lb (73.9 kg)   12/22/22 180 lb (81.6 kg)       Physical Exam  Nursing note reviewed. Constitutional:       General: He is not in acute distress. Appearance: Normal appearance. He is well-developed. Eyes:      General: Lids are normal.      Extraocular Movements: Extraocular movements intact. Pupils: Pupils are equal, round, and reactive to light. Neck:      Thyroid: No thyromegaly. Cardiovascular:      Rate and Rhythm: Normal rate and regular rhythm. Heart sounds: Normal heart sounds, S1 normal and S2 normal.     No friction rub. Pulmonary:      Effort: Pulmonary effort is normal. No respiratory distress. Breath sounds: Normal breath sounds. No wheezing. Abdominal:      General: Bowel sounds are normal. There is no distension. Palpations: Abdomen is soft. Tenderness: There is no abdominal tenderness. Musculoskeletal:      Cervical back: Neck supple. Neurological:      Mental Status: He is alert. No results found for this visit on 08/07/23.   Lab Review   Orders Only on 08/02/2023   Component Date Value    Iron 08/02/2023 41 (L)     TIBC 08/02/2023 273     Iron Saturation 08/02/2023 15 (L)     TSH 08/02/2023 2.64     WBC 08/02/2023 8.5     RBC 08/02/2023 3.80 (L)     Hemoglobin 08/02/2023 12.9 (L)     Hematocrit 08/02/2023 38.6 (L)     MCV 08/02/2023 101.5 (H)     MCH 08/02/2023 33.9     MCHC 08/02/2023 33.4     RDW 08/02/2023 14.3     Platelets 62/24/3960 437     MPV 08/02/2023 7.1     Neutrophils % 08/02/2023 60.1     Lymphocytes % 08/02/2023 20.6     Monocytes % 08/02/2023 11.7     Eosinophils % 08/02/2023 6.6     Basophils % 08/02/2023 1.0     Neutrophils Absolute 08/02/2023 5.1     Lymphocytes Absolute 08/02/2023 1.7     Monocytes Absolute 08/02/2023 1.0     Eosinophils Absolute 08/02/2023 0.6     Basophils Absolute 08/02/2023 0.1     Cholesterol, Total 08/02/2023 147     Triglycerides 08/02/2023 99     HDL 08/02/2023 46     LDL Calculated 08/02/2023 81     VLDL

## 2023-08-17 ASSESSMENT — ENCOUNTER SYMPTOMS
ABDOMINAL PAIN: 0
NAUSEA: 0
CONSTIPATION: 0
SHORTNESS OF BREATH: 0
VOMITING: 0
CHEST TIGHTNESS: 0
DIARRHEA: 0
COUGH: 0

## 2023-08-29 DIAGNOSIS — D50.9 IRON DEFICIENCY ANEMIA, UNSPECIFIED IRON DEFICIENCY ANEMIA TYPE: ICD-10-CM

## 2023-08-29 DIAGNOSIS — R74.8 ELEVATED ALKALINE PHOSPHATASE LEVEL: ICD-10-CM

## 2023-08-29 LAB
DEPRECATED RDW RBC AUTO: 13.8 % (ref 12.4–15.4)
HCT VFR BLD AUTO: 38.9 % (ref 40.5–52.5)
HGB BLD-MCNC: 12.8 G/DL (ref 13.5–17.5)
IRON SATN MFR SERPL: 22 % (ref 20–50)
IRON SERPL-MCNC: 63 UG/DL (ref 59–158)
MCH RBC QN AUTO: 34.1 PG (ref 26–34)
MCHC RBC AUTO-ENTMCNC: 33 G/DL (ref 31–36)
MCV RBC AUTO: 103.3 FL (ref 80–100)
PLATELET # BLD AUTO: 399 K/UL (ref 135–450)
PMV BLD AUTO: 7 FL (ref 5–10.5)
RBC # BLD AUTO: 3.76 M/UL (ref 4.2–5.9)
TIBC SERPL-MCNC: 292 UG/DL (ref 260–445)
WBC # BLD AUTO: 8.9 K/UL (ref 4–11)

## 2023-09-01 LAB
ALP BONE SERPL-CCNC: 69 U/L (ref 0–55)
ALP ISOS SERPL HS-CCNC: 0 U/L
ALP LIVER SERPL-CCNC: 69 U/L (ref 0–94)
ALP SERPL-CCNC: 138 U/L (ref 40–120)

## 2023-09-05 ENCOUNTER — OFFICE VISIT (OUTPATIENT)
Dept: PRIMARY CARE CLINIC | Age: 88
End: 2023-09-05

## 2023-09-05 VITALS
DIASTOLIC BLOOD PRESSURE: 72 MMHG | SYSTOLIC BLOOD PRESSURE: 118 MMHG | WEIGHT: 167.2 LBS | OXYGEN SATURATION: 98 % | BODY MASS INDEX: 22.68 KG/M2 | HEART RATE: 67 BPM

## 2023-09-05 DIAGNOSIS — R63.4 WEIGHT LOSS: Primary | ICD-10-CM

## 2023-09-05 DIAGNOSIS — R71.8 ELEVATED MCV: ICD-10-CM

## 2023-09-05 DIAGNOSIS — D50.9 IRON DEFICIENCY ANEMIA, UNSPECIFIED IRON DEFICIENCY ANEMIA TYPE: ICD-10-CM

## 2023-09-05 DIAGNOSIS — R74.8 ELEVATED ALKALINE PHOSPHATASE LEVEL: ICD-10-CM

## 2023-09-05 RX ORDER — FERROUS SULFATE 325(65) MG
325 TABLET ORAL EVERY OTHER DAY
COMMUNITY
Start: 2023-09-05

## 2023-09-05 NOTE — PROGRESS NOTES
Date of Visit: 2023    Lord Sepulveda (:  1935) is a 82 y.o. male,  Established patient here for evaluation of the following chief complaint(s):  Weight Loss and Anemia      ASSESSMENT/PLAN:    1. Weight loss  -improving and patient has started to gain weight  -continue to eat 3 meals daily  -patient drinks Ensure Plus for dessert  -monitor weight    2. Iron deficiency anemia, unspecified iron deficiency anemia type  -stable  -Continue ferrous sulfate (IRON 325) 325 (65 Fe) MG tablet; Take 1 tablet by mouth every other day  -Iron rich diet  -CBC; Future  -Iron and TIBC; Future    3. Elevated alkaline phosphatase level  -improving   -elevation in alkaline phosphatase is from bone which is consistent with patient's healing hip fracture    4. Elevated MCV  - Folate; Future  - Vitamin B12; Future       Return in about 16 weeks (around 2023) for Medicare AW. SUBJECTIVE:    Patient has anemia. Patient takes iron every other day. Patient didn't take iron daily because in the past it caused constipation. Patient is eating better and gaining weight. Patient states he eats 3 meals daily. Patient states he is doing more cooking than he was. Patient states he drinks 1 Ensure Plus per day for dessert at lunch. Patient had follow up labs done for elevated alkaline phosphatase and it shows elevation is due to bone. Patient wants flu shot at a later date. Review of Systems   Constitutional:  Positive for unexpected weight change. Negative for chills, fatigue and fever. HENT:  Negative for congestion, postnasal drip, rhinorrhea and sore throat. Eyes:  Negative for visual disturbance. Respiratory:  Negative for cough, chest tightness and shortness of breath. Cardiovascular:  Negative for chest pain, palpitations and leg swelling. Gastrointestinal:  Negative for abdominal pain, constipation, diarrhea, nausea and vomiting. Endocrine: Negative for cold intolerance.

## 2023-09-08 DIAGNOSIS — F32.5 MAJOR DEPRESSION, SINGLE EPISODE, IN COMPLETE REMISSION (HCC): ICD-10-CM

## 2023-09-08 NOTE — TELEPHONE ENCOUNTER
Medication:   Requested Prescriptions     Pending Prescriptions Disp Refills    sertraline (ZOLOFT) 50 MG tablet [Pharmacy Med Name: Sertraline HCl Oral Tablet 50 MG] 90 tablet 0     Sig: TAKE 1 TABLET BY MOUTH EVERY DAY     Last Filled:  1/17/23    Last appt: 9/5/2023   Next appt: 12/28/2023    Last Labs DM:   Lab Results   Component Value Date/Time    LABA1C 5.6 06/26/2023 12:05 PM     Last Lipid:   Lab Results   Component Value Date/Time    CHOL 147 08/02/2023 08:24 AM    TRIG 99 08/02/2023 08:24 AM    HDL 46 08/02/2023 08:24 AM    LDLCALC 81 08/02/2023 08:24 AM     Last PSA: No results found for: PSA  Last Thyroid:   Lab Results   Component Value Date/Time    TSH 2.64 08/02/2023 08:24 AM

## 2023-09-17 PROBLEM — R71.8 ELEVATED MCV: Status: ACTIVE | Noted: 2023-09-17

## 2023-09-17 ASSESSMENT — ENCOUNTER SYMPTOMS
NAUSEA: 0
SHORTNESS OF BREATH: 0
SORE THROAT: 0
VOMITING: 0
DIARRHEA: 0
COUGH: 0
CHEST TIGHTNESS: 0
CONSTIPATION: 0
ABDOMINAL PAIN: 0
RHINORRHEA: 0

## 2023-10-04 DIAGNOSIS — D50.9 IRON DEFICIENCY ANEMIA, UNSPECIFIED IRON DEFICIENCY ANEMIA TYPE: ICD-10-CM

## 2023-10-04 DIAGNOSIS — R71.8 ELEVATED MCV: ICD-10-CM

## 2023-10-04 LAB
DEPRECATED RDW RBC AUTO: 14.1 % (ref 12.4–15.4)
FOLATE SERPL-MCNC: >20 NG/ML (ref 4.78–24.2)
HCT VFR BLD AUTO: 38.6 % (ref 40.5–52.5)
HGB BLD-MCNC: 13 G/DL (ref 13.5–17.5)
IRON SATN MFR SERPL: 34 % (ref 20–50)
IRON SERPL-MCNC: 90 UG/DL (ref 59–158)
MCH RBC QN AUTO: 34.3 PG (ref 26–34)
MCHC RBC AUTO-ENTMCNC: 33.8 G/DL (ref 31–36)
MCV RBC AUTO: 101.5 FL (ref 80–100)
PLATELET # BLD AUTO: 322 K/UL (ref 135–450)
PMV BLD AUTO: 7.3 FL (ref 5–10.5)
RBC # BLD AUTO: 3.8 M/UL (ref 4.2–5.9)
TIBC SERPL-MCNC: 267 UG/DL (ref 260–445)
VIT B12 SERPL-MCNC: 729 PG/ML (ref 211–911)
WBC # BLD AUTO: 6.9 K/UL (ref 4–11)

## 2023-12-28 ENCOUNTER — OFFICE VISIT (OUTPATIENT)
Dept: PRIMARY CARE CLINIC | Age: 88
End: 2023-12-28
Payer: MEDICARE

## 2023-12-28 VITALS
BODY MASS INDEX: 23.95 KG/M2 | WEIGHT: 176.8 LBS | DIASTOLIC BLOOD PRESSURE: 62 MMHG | HEIGHT: 72 IN | SYSTOLIC BLOOD PRESSURE: 118 MMHG | HEART RATE: 63 BPM | TEMPERATURE: 97.2 F | RESPIRATION RATE: 16 BRPM | OXYGEN SATURATION: 93 %

## 2023-12-28 DIAGNOSIS — I10 ESSENTIAL HYPERTENSION: ICD-10-CM

## 2023-12-28 DIAGNOSIS — N39.0 URINARY TRACT INFECTION WITH HEMATURIA, SITE UNSPECIFIED: Primary | ICD-10-CM

## 2023-12-28 DIAGNOSIS — R79.89 LOW SERUM CALCIUM: ICD-10-CM

## 2023-12-28 DIAGNOSIS — F32.5 MAJOR DEPRESSION, SINGLE EPISODE, IN COMPLETE REMISSION (HCC): ICD-10-CM

## 2023-12-28 DIAGNOSIS — R31.9 URINARY TRACT INFECTION WITH HEMATURIA, SITE UNSPECIFIED: Primary | ICD-10-CM

## 2023-12-28 DIAGNOSIS — D50.9 IRON DEFICIENCY ANEMIA, UNSPECIFIED IRON DEFICIENCY ANEMIA TYPE: ICD-10-CM

## 2023-12-28 PROCEDURE — 99214 OFFICE O/P EST MOD 30 MIN: CPT | Performed by: INTERNAL MEDICINE

## 2023-12-28 PROCEDURE — 1123F ACP DISCUSS/DSCN MKR DOCD: CPT | Performed by: INTERNAL MEDICINE

## 2023-12-28 RX ORDER — CEFDINIR 300 MG/1
300 CAPSULE ORAL EVERY 12 HOURS
COMMUNITY
Start: 2023-12-21 | End: 2023-12-31

## 2023-12-28 NOTE — PROGRESS NOTES
Appearance: Normal appearance. He is well-developed.   HENT:      Mouth/Throat:      Pharynx: Oropharynx is clear.   Eyes:      General: Lids are normal.      Extraocular Movements: Extraocular movements intact.      Pupils: Pupils are equal, round, and reactive to light.   Neck:      Thyroid: No thyromegaly.      Vascular: No carotid bruit.   Cardiovascular:      Rate and Rhythm: Normal rate and regular rhythm.      Heart sounds: Normal heart sounds, S1 normal and S2 normal. No murmur heard.  Pulmonary:      Effort: Pulmonary effort is normal. No respiratory distress.      Breath sounds: Normal breath sounds. No wheezing.   Abdominal:      General: Bowel sounds are normal. There is no distension.      Palpations: Abdomen is soft.      Tenderness: There is no abdominal tenderness.   Musculoskeletal:      Cervical back: Neck supple.      Right lower leg: No edema.      Left lower leg: No edema.   Neurological:      Mental Status: He is alert.   Psychiatric:         Mood and Affect: Mood normal.         No results found for this visit on 12/28/23.  Lab Review   Orders Only on 10/04/2023   Component Date Value    Vitamin B-12 10/04/2023 729     WBC 10/04/2023 6.9     RBC 10/04/2023 3.80 (L)     Hemoglobin 10/04/2023 13.0 (L)     Hematocrit 10/04/2023 38.6 (L)     MCV 10/04/2023 101.5 (H)     MCH 10/04/2023 34.3 (H)     MCHC 10/04/2023 33.8     RDW 10/04/2023 14.1     Platelets 10/04/2023 322     MPV 10/04/2023 7.3     Iron 10/04/2023 90     TIBC 10/04/2023 267     Iron Saturation 10/04/2023 34     Folate 10/04/2023 >20.00    Orders Only on 08/29/2023   Component Date Value    Alk Phosphatase 08/29/2023 138 (H)     Alk Phos Bone Fract 08/29/2023 69 (H)     Alk Phos Liver Fract 08/29/2023 69     Alk Phos Other Calc 08/29/2023 0     Iron 08/29/2023 63     TIBC 08/29/2023 292     Iron Saturation 08/29/2023 22     WBC 08/29/2023 8.9     RBC 08/29/2023 3.76 (L)     Hemoglobin 08/29/2023 12.8 (L)     Hematocrit 08/29/2023

## 2024-01-02 DIAGNOSIS — D50.9 IRON DEFICIENCY ANEMIA, UNSPECIFIED IRON DEFICIENCY ANEMIA TYPE: ICD-10-CM

## 2024-01-02 DIAGNOSIS — R79.89 LOW SERUM CALCIUM: ICD-10-CM

## 2024-01-02 DIAGNOSIS — I10 ESSENTIAL HYPERTENSION: ICD-10-CM

## 2024-01-02 DIAGNOSIS — R31.9 URINARY TRACT INFECTION WITH HEMATURIA, SITE UNSPECIFIED: ICD-10-CM

## 2024-01-02 DIAGNOSIS — N39.0 URINARY TRACT INFECTION WITH HEMATURIA, SITE UNSPECIFIED: ICD-10-CM

## 2024-01-03 LAB
ANION GAP SERPL CALCULATED.3IONS-SCNC: 9 MMOL/L (ref 3–16)
BASOPHILS # BLD: 0.2 K/UL (ref 0–0.2)
BASOPHILS NFR BLD: 2.5 %
BUN SERPL-MCNC: 22 MG/DL (ref 7–20)
CALCIUM SERPL-MCNC: 8.9 MG/DL (ref 8.3–10.6)
CHLORIDE SERPL-SCNC: 108 MMOL/L (ref 99–110)
CO2 SERPL-SCNC: 28 MMOL/L (ref 21–32)
CREAT SERPL-MCNC: 1.1 MG/DL (ref 0.8–1.3)
DEPRECATED RDW RBC AUTO: 14.8 % (ref 12.4–15.4)
EOSINOPHIL # BLD: 0.8 K/UL (ref 0–0.6)
EOSINOPHIL NFR BLD: 9.7 %
GFR SERPLBLD CREATININE-BSD FMLA CKD-EPI: >60 ML/MIN/{1.73_M2}
GLUCOSE SERPL-MCNC: 102 MG/DL (ref 70–99)
HCT VFR BLD AUTO: 36.4 % (ref 40.5–52.5)
HGB BLD-MCNC: 12.2 G/DL (ref 13.5–17.5)
IRON SATN MFR SERPL: 31 % (ref 20–50)
IRON SERPL-MCNC: 90 UG/DL (ref 59–158)
LYMPHOCYTES # BLD: 1.8 K/UL (ref 1–5.1)
LYMPHOCYTES NFR BLD: 22.3 %
MCH RBC QN AUTO: 34.4 PG (ref 26–34)
MCHC RBC AUTO-ENTMCNC: 33.5 G/DL (ref 31–36)
MCV RBC AUTO: 102.7 FL (ref 80–100)
MONOCYTES # BLD: 0.7 K/UL (ref 0–1.3)
MONOCYTES NFR BLD: 9.1 %
NEUTROPHILS # BLD: 4.5 K/UL (ref 1.7–7.7)
NEUTROPHILS NFR BLD: 56.4 %
PLATELET # BLD AUTO: 473 K/UL (ref 135–450)
PMV BLD AUTO: 7.6 FL (ref 5–10.5)
POTASSIUM SERPL-SCNC: 4.6 MMOL/L (ref 3.5–5.1)
RBC # BLD AUTO: 3.55 M/UL (ref 4.2–5.9)
SODIUM SERPL-SCNC: 145 MMOL/L (ref 136–145)
TIBC SERPL-MCNC: 291 UG/DL (ref 260–445)
WBC # BLD AUTO: 7.9 K/UL (ref 4–11)

## 2024-01-04 ENCOUNTER — TELEMEDICINE (OUTPATIENT)
Dept: PRIMARY CARE CLINIC | Age: 89
End: 2024-01-04
Payer: MEDICARE

## 2024-01-04 DIAGNOSIS — Z00.00 MEDICARE ANNUAL WELLNESS VISIT, SUBSEQUENT: Primary | ICD-10-CM

## 2024-01-04 PROBLEM — R31.9 URINARY TRACT INFECTION WITH HEMATURIA: Status: ACTIVE | Noted: 2024-01-04

## 2024-01-04 PROBLEM — N39.0 URINARY TRACT INFECTION WITH HEMATURIA: Status: ACTIVE | Noted: 2024-01-04

## 2024-01-04 PROBLEM — R79.89 LOW SERUM CALCIUM: Status: ACTIVE | Noted: 2024-01-04

## 2024-01-04 LAB — BACTERIA UR CULT: NORMAL

## 2024-01-04 PROCEDURE — 1123F ACP DISCUSS/DSCN MKR DOCD: CPT | Performed by: INTERNAL MEDICINE

## 2024-01-04 PROCEDURE — G0439 PPPS, SUBSEQ VISIT: HCPCS | Performed by: INTERNAL MEDICINE

## 2024-01-04 ASSESSMENT — PATIENT HEALTH QUESTIONNAIRE - PHQ9
3. TROUBLE FALLING OR STAYING ASLEEP: 0
SUM OF ALL RESPONSES TO PHQ QUESTIONS 1-9: 1
2. FEELING DOWN, DEPRESSED OR HOPELESS: 1
SUM OF ALL RESPONSES TO PHQ QUESTIONS 1-9: 1
7. TROUBLE CONCENTRATING ON THINGS, SUCH AS READING THE NEWSPAPER OR WATCHING TELEVISION: 0
8. MOVING OR SPEAKING SO SLOWLY THAT OTHER PEOPLE COULD HAVE NOTICED. OR THE OPPOSITE, BEING SO FIGETY OR RESTLESS THAT YOU HAVE BEEN MOVING AROUND A LOT MORE THAN USUAL: 0
1. LITTLE INTEREST OR PLEASURE IN DOING THINGS: 0
SUM OF ALL RESPONSES TO PHQ9 QUESTIONS 1 & 2: 1
SUM OF ALL RESPONSES TO PHQ QUESTIONS 1-9: 1
5. POOR APPETITE OR OVEREATING: 0
6. FEELING BAD ABOUT YOURSELF - OR THAT YOU ARE A FAILURE OR HAVE LET YOURSELF OR YOUR FAMILY DOWN: 0
SUM OF ALL RESPONSES TO PHQ QUESTIONS 1-9: 1
10. IF YOU CHECKED OFF ANY PROBLEMS, HOW DIFFICULT HAVE THESE PROBLEMS MADE IT FOR YOU TO DO YOUR WORK, TAKE CARE OF THINGS AT HOME, OR GET ALONG WITH OTHER PEOPLE: 0
4. FEELING TIRED OR HAVING LITTLE ENERGY: 0
9. THOUGHTS THAT YOU WOULD BE BETTER OFF DEAD, OR OF HURTING YOURSELF: 0

## 2024-01-04 ASSESSMENT — ENCOUNTER SYMPTOMS
ABDOMINAL PAIN: 0
DIARRHEA: 0
COUGH: 0
WHEEZING: 0
CHEST TIGHTNESS: 0
RHINORRHEA: 0
SORE THROAT: 0
CONSTIPATION: 0
SHORTNESS OF BREATH: 0
NAUSEA: 0
VOMITING: 0

## 2024-01-04 NOTE — PROGRESS NOTES
Medicare Annual Wellness Visit    Rod Rodriguez is here for Medicare AWV    Assessment & Plan   Medicare annual wellness visit, subsequent  Recommendations for Preventive Services Due: see orders and patient instructions/AVS.  Recommended screening schedule for the next 5-10 years is provided to the patient in written form: see Patient Instructions/AVS.     No follow-ups on file.     Subjective       Patient's complete Health Risk Assessment and screening values have been reviewed and are found in Flowsheets. The following problems were reviewed today and where indicated follow up appointments were made and/or referrals ordered.    Positive Risk Factor Screenings with Interventions:    Fall Risk:  Do you feel unsteady or are you worried about falling? : (!) yes  2 or more falls in past year?: (!) yes  Fall with injury in past year?: (!) yes     Interventions:    Reviewed medications, home hazards, visual acuity, and co-morbidities that can increase risk for falls Patient states he did PT and they discharged him because he didn't need ant longer.              Activity, Diet, and Weight:  On average, how many days per week do you engage in moderate to strenuous exercise (like a brisk walk)?: 0 days  On average, how many minutes do you engage in exercise at this level?: 0 min    Do you eat balanced/healthy meals regularly?: Yes    There is no height or weight on file to calculate BMI.      Inactivity Interventions:  Patient declined any further interventions or treatment            ADL's:   Patient reports needing help with:  Select all that apply: (!) Transportation  Interventions:  Patient states family helps with transportation.                   Objective      Patient-Reported Vitals  Patient-Reported Systolic (Top): 116 mmHg  Patient-Reported Diastolic (Bottom): 61 mmHg  Patient-Reported Pulse: 69              Allergies   Allergen Reactions    Isosorbide Nitrate Other (See Comments)     headache    Nitroglycerin

## 2024-01-23 LAB — LEFT VENTRICULAR EJECTION FRACTION, EXTERNAL: 43

## 2024-01-25 ENCOUNTER — APPOINTMENT (OUTPATIENT)
Age: 89
DRG: 854 | End: 2024-01-25
Payer: MEDICARE

## 2024-01-25 ENCOUNTER — HOSPITAL ENCOUNTER (INPATIENT)
Age: 89
LOS: 5 days | Discharge: HOME HEALTH | DRG: 854 | End: 2024-01-30
Attending: EMERGENCY MEDICINE | Admitting: STUDENT IN AN ORGANIZED HEALTH CARE EDUCATION/TRAINING PROGRAM
Payer: MEDICARE

## 2024-01-25 DIAGNOSIS — N17.9 AKI (ACUTE KIDNEY INJURY) (HCC): ICD-10-CM

## 2024-01-25 DIAGNOSIS — R78.81 BACTEREMIA DUE TO KLEBSIELLA PNEUMONIAE: ICD-10-CM

## 2024-01-25 DIAGNOSIS — B96.1 BACTEREMIA DUE TO KLEBSIELLA PNEUMONIAE: ICD-10-CM

## 2024-01-25 DIAGNOSIS — N20.1 CALCULUS OF DISTAL URETER: ICD-10-CM

## 2024-01-25 DIAGNOSIS — N30.00 ACUTE CYSTITIS WITHOUT HEMATURIA: Primary | ICD-10-CM

## 2024-01-25 DIAGNOSIS — N39.0 COMPLICATED URINARY TRACT INFECTION: ICD-10-CM

## 2024-01-25 LAB
ALBUMIN SERPL-MCNC: 3.3 G/DL (ref 3.4–5)
ALBUMIN/GLOB SERPL: 0.8 {RATIO}
ALP SERPL-CCNC: 94 U/L (ref 40–129)
ALT SERPL-CCNC: 19 U/L (ref 10–40)
ANION GAP SERPL CALCULATED.3IONS-SCNC: 13 MMOL/L (ref 3–16)
AST SERPL-CCNC: 22 U/L (ref 15–37)
BACTERIA URNS QL MICRO: ABNORMAL
BASOPHILS # BLD: 0.03 K/UL (ref 0–0.2)
BASOPHILS NFR BLD: 0 %
BILIRUB DIRECT SERPL-MCNC: <0.2 MG/DL (ref 0–0.3)
BILIRUB INDIRECT SERPL-MCNC: ABNORMAL MG/DL (ref 0–1)
BILIRUB SERPL-MCNC: 0.4 MG/DL (ref 0–1)
BILIRUB UR QL STRIP: NEGATIVE
BNP SERPL-MCNC: 1490 PG/ML (ref 0–449)
BUN SERPL-MCNC: 39 MG/DL (ref 7–20)
CALCIUM SERPL-MCNC: 9.2 MG/DL (ref 8.3–10.6)
CHARACTER UR: ABNORMAL
CHLORIDE SERPL-SCNC: 105 MMOL/L (ref 99–110)
CLARITY UR: CLEAR
CO2 SERPL-SCNC: 21 MMOL/L (ref 21–32)
COLOR UR: YELLOW
CREAT SERPL-MCNC: 1.6 MG/DL (ref 0.8–1.3)
CREAT UR-MCNC: 32.3 MG/DL (ref 39–259)
EOSINOPHIL # BLD: 0.12 K/UL (ref 0–0.6)
EOSINOPHILS RELATIVE PERCENT: 1 %
ERYTHROCYTE [DISTWIDTH] IN BLOOD BY AUTOMATED COUNT: 13.5 % (ref 12.4–15.4)
FLUAV AG SPEC QL: NEGATIVE
FLUBV AG SPEC QL: NEGATIVE
GFR SERPL CREATININE-BSD FRML MDRD: 43 ML/MIN/1.73M2
GLUCOSE SERPL-MCNC: 134 MG/DL (ref 70–99)
GLUCOSE UR STRIP-MCNC: NEGATIVE MG/DL
HCT VFR BLD AUTO: 32.4 % (ref 40.5–52.5)
HGB BLD-MCNC: 10.7 G/DL (ref 13.5–17.5)
HGB UR QL STRIP.AUTO: ABNORMAL
IMM GRANULOCYTES # BLD AUTO: 0.2 K/UL (ref 0–0.5)
IMM GRANULOCYTES NFR BLD: 1 %
KETONES UR STRIP-MCNC: NEGATIVE MG/DL
LACTATE BLDV-SCNC: 1.3 MMOL/L (ref 0.4–2)
LEUKOCYTE ESTERASE UR QL STRIP: ABNORMAL
LIPASE SERPL-CCNC: 28 U/L (ref 13–60)
LYMPHOCYTES NFR BLD: 0.93 K/UL (ref 1–5.1)
LYMPHOCYTES RELATIVE PERCENT: 4 %
MCH RBC QN AUTO: 33.5 PG (ref 26–34)
MCHC RBC AUTO-ENTMCNC: 33 G/DL (ref 31–36)
MCV RBC AUTO: 101.6 FL (ref 80–100)
MONOCYTES NFR BLD: 1.64 K/UL (ref 0–1.3)
MONOCYTES NFR BLD: 7 %
MUCOUS THREADS URNS QL MICRO: PRESENT
NEUTROPHILS NFR BLD: 87 %
NEUTS SEG NFR BLD: 19.52 K/UL (ref 1.7–7.7)
NITRITE UR QL STRIP: NEGATIVE
PH UR STRIP: 5.5 [PH] (ref 5–8)
PLATELET # BLD AUTO: 612 K/UL (ref 135–450)
PMV BLD AUTO: 9.3 FL (ref 9.4–12.4)
POTASSIUM SERPL-SCNC: 5 MMOL/L (ref 3.5–5.1)
PROCALCITONIN SERPL-MCNC: 0.76 NG/ML (ref 0–0.15)
PROT SERPL-MCNC: 7.2 G/DL (ref 6.4–8.2)
PROT UR STRIP-MCNC: 30 MG/DL
RBC # BLD AUTO: 3.19 M/UL (ref 4.2–5.9)
RBC #/AREA URNS HPF: ABNORMAL /HPF
SARS-COV-2 RDRP RESP QL NAA+PROBE: NOT DETECTED
SODIUM SERPL-SCNC: 139 MMOL/L (ref 136–145)
SODIUM UR-SCNC: 76 MMOL/L
SP GR UR STRIP: 1.01 (ref 1–1.03)
SPECIMEN DESCRIPTION: NORMAL
TROPONIN I SERPL HS-MCNC: 39 NG/L (ref 0–22)
TROPONIN I SERPL HS-MCNC: 41 NG/L (ref 0–22)
UROBILINOGEN UR STRIP-ACNC: 0.2 EU/DL (ref 0–1)
WBC #/AREA URNS HPF: ABNORMAL /HPF
WBC OTHER # BLD: 23.3 K/UL (ref 4–11)

## 2024-01-25 PROCEDURE — 6360000002 HC RX W HCPCS: Performed by: STUDENT IN AN ORGANIZED HEALTH CARE EDUCATION/TRAINING PROGRAM

## 2024-01-25 PROCEDURE — 1200000000 HC SEMI PRIVATE

## 2024-01-25 PROCEDURE — 84145 PROCALCITONIN (PCT): CPT

## 2024-01-25 PROCEDURE — 2580000003 HC RX 258: Performed by: STUDENT IN AN ORGANIZED HEALTH CARE EDUCATION/TRAINING PROGRAM

## 2024-01-25 PROCEDURE — 87086 URINE CULTURE/COLONY COUNT: CPT

## 2024-01-25 PROCEDURE — 87804 INFLUENZA ASSAY W/OPTIC: CPT

## 2024-01-25 PROCEDURE — 84484 ASSAY OF TROPONIN QUANT: CPT

## 2024-01-25 PROCEDURE — 2580000003 HC RX 258: Performed by: EMERGENCY MEDICINE

## 2024-01-25 PROCEDURE — 96365 THER/PROPH/DIAG IV INF INIT: CPT

## 2024-01-25 PROCEDURE — 87040 BLOOD CULTURE FOR BACTERIA: CPT

## 2024-01-25 PROCEDURE — 84300 ASSAY OF URINE SODIUM: CPT

## 2024-01-25 PROCEDURE — 6370000000 HC RX 637 (ALT 250 FOR IP): Performed by: STUDENT IN AN ORGANIZED HEALTH CARE EDUCATION/TRAINING PROGRAM

## 2024-01-25 PROCEDURE — 87154 CUL TYP ID BLD PTHGN 6+ TRGT: CPT

## 2024-01-25 PROCEDURE — 83935 ASSAY OF URINE OSMOLALITY: CPT

## 2024-01-25 PROCEDURE — 81001 URINALYSIS AUTO W/SCOPE: CPT

## 2024-01-25 PROCEDURE — 87077 CULTURE AEROBIC IDENTIFY: CPT

## 2024-01-25 PROCEDURE — 71250 CT THORAX DX C-: CPT

## 2024-01-25 PROCEDURE — 87635 SARS-COV-2 COVID-19 AMP PRB: CPT

## 2024-01-25 PROCEDURE — 82570 ASSAY OF URINE CREATININE: CPT

## 2024-01-25 PROCEDURE — 84540 ASSAY OF URINE/UREA-N: CPT

## 2024-01-25 PROCEDURE — 85025 COMPLETE CBC W/AUTO DIFF WBC: CPT

## 2024-01-25 PROCEDURE — 99285 EMERGENCY DEPT VISIT HI MDM: CPT

## 2024-01-25 PROCEDURE — 6360000002 HC RX W HCPCS: Performed by: EMERGENCY MEDICINE

## 2024-01-25 PROCEDURE — 80053 COMPREHEN METABOLIC PANEL: CPT

## 2024-01-25 PROCEDURE — 83880 ASSAY OF NATRIURETIC PEPTIDE: CPT

## 2024-01-25 PROCEDURE — 70450 CT HEAD/BRAIN W/O DYE: CPT

## 2024-01-25 PROCEDURE — 83605 ASSAY OF LACTIC ACID: CPT

## 2024-01-25 PROCEDURE — 82248 BILIRUBIN DIRECT: CPT

## 2024-01-25 PROCEDURE — 36415 COLL VENOUS BLD VENIPUNCTURE: CPT

## 2024-01-25 PROCEDURE — 83690 ASSAY OF LIPASE: CPT

## 2024-01-25 RX ORDER — FLUTICASONE PROPIONATE 50 MCG
2 SPRAY, SUSPENSION (ML) NASAL DAILY
Status: DISCONTINUED | OUTPATIENT
Start: 2024-01-25 | End: 2024-01-30 | Stop reason: HOSPADM

## 2024-01-25 RX ORDER — ACETAMINOPHEN 325 MG/1
650 TABLET ORAL EVERY 6 HOURS PRN
Status: DISCONTINUED | OUTPATIENT
Start: 2024-01-25 | End: 2024-01-30 | Stop reason: HOSPADM

## 2024-01-25 RX ORDER — ONDANSETRON 2 MG/ML
4 INJECTION INTRAMUSCULAR; INTRAVENOUS EVERY 6 HOURS PRN
Status: DISCONTINUED | OUTPATIENT
Start: 2024-01-25 | End: 2024-01-30 | Stop reason: HOSPADM

## 2024-01-25 RX ORDER — ACETAMINOPHEN 650 MG/1
650 SUPPOSITORY RECTAL EVERY 6 HOURS PRN
Status: DISCONTINUED | OUTPATIENT
Start: 2024-01-25 | End: 2024-01-30 | Stop reason: HOSPADM

## 2024-01-25 RX ORDER — ACETAMINOPHEN 325 MG/1
650 TABLET ORAL EVERY 6 HOURS PRN
Status: DISCONTINUED | OUTPATIENT
Start: 2024-01-25 | End: 2024-01-25 | Stop reason: SDUPTHER

## 2024-01-25 RX ORDER — ASPIRIN 81 MG/1
81 TABLET, CHEWABLE ORAL DAILY
Status: DISCONTINUED | OUTPATIENT
Start: 2024-01-25 | End: 2024-01-30 | Stop reason: HOSPADM

## 2024-01-25 RX ORDER — CETIRIZINE HYDROCHLORIDE 10 MG/1
5 TABLET ORAL DAILY
Status: DISCONTINUED | OUTPATIENT
Start: 2024-01-25 | End: 2024-01-30 | Stop reason: HOSPADM

## 2024-01-25 RX ORDER — FERROUS SULFATE 325(65) MG
325 TABLET ORAL EVERY OTHER DAY
Status: DISCONTINUED | OUTPATIENT
Start: 2024-01-25 | End: 2024-01-30 | Stop reason: HOSPADM

## 2024-01-25 RX ORDER — SODIUM CHLORIDE 0.9 % (FLUSH) 0.9 %
5-40 SYRINGE (ML) INJECTION EVERY 12 HOURS SCHEDULED
Status: DISCONTINUED | OUTPATIENT
Start: 2024-01-25 | End: 2024-01-30 | Stop reason: HOSPADM

## 2024-01-25 RX ORDER — SODIUM CHLORIDE 0.9 % (FLUSH) 0.9 %
5-40 SYRINGE (ML) INJECTION PRN
Status: DISCONTINUED | OUTPATIENT
Start: 2024-01-25 | End: 2024-01-30 | Stop reason: HOSPADM

## 2024-01-25 RX ORDER — METOPROLOL SUCCINATE 25 MG/1
25 TABLET, EXTENDED RELEASE ORAL 2 TIMES DAILY
Status: DISCONTINUED | OUTPATIENT
Start: 2024-01-25 | End: 2024-01-30 | Stop reason: HOSPADM

## 2024-01-25 RX ORDER — TAMSULOSIN HYDROCHLORIDE 0.4 MG/1
0.4 CAPSULE ORAL DAILY
Status: DISCONTINUED | OUTPATIENT
Start: 2024-01-25 | End: 2024-01-30 | Stop reason: HOSPADM

## 2024-01-25 RX ORDER — 0.9 % SODIUM CHLORIDE 0.9 %
30 INTRAVENOUS SOLUTION INTRAVENOUS ONCE
Status: COMPLETED | OUTPATIENT
Start: 2024-01-25 | End: 2024-01-25

## 2024-01-25 RX ORDER — SODIUM CHLORIDE 9 MG/ML
INJECTION, SOLUTION INTRAVENOUS CONTINUOUS
Status: ACTIVE | OUTPATIENT
Start: 2024-01-25 | End: 2024-01-27

## 2024-01-25 RX ORDER — SODIUM CHLORIDE 9 MG/ML
INJECTION, SOLUTION INTRAVENOUS PRN
Status: DISCONTINUED | OUTPATIENT
Start: 2024-01-25 | End: 2024-01-30 | Stop reason: HOSPADM

## 2024-01-25 RX ORDER — CLOPIDOGREL BISULFATE 75 MG/1
75 TABLET ORAL DAILY
Status: DISCONTINUED | OUTPATIENT
Start: 2024-01-25 | End: 2024-01-30 | Stop reason: HOSPADM

## 2024-01-25 RX ORDER — ONDANSETRON 4 MG/1
4 TABLET, ORALLY DISINTEGRATING ORAL EVERY 8 HOURS PRN
Status: DISCONTINUED | OUTPATIENT
Start: 2024-01-25 | End: 2024-01-30 | Stop reason: HOSPADM

## 2024-01-25 RX ORDER — POLYETHYLENE GLYCOL 3350 17 G/17G
17 POWDER, FOR SOLUTION ORAL DAILY PRN
Status: DISCONTINUED | OUTPATIENT
Start: 2024-01-25 | End: 2024-01-30 | Stop reason: HOSPADM

## 2024-01-25 RX ORDER — ACETAMINOPHEN 325 MG/1
650 TABLET ORAL EVERY 8 HOURS PRN
Status: DISCONTINUED | OUTPATIENT
Start: 2024-01-25 | End: 2024-01-25 | Stop reason: SDUPTHER

## 2024-01-25 RX ORDER — ATORVASTATIN CALCIUM 40 MG/1
40 TABLET, FILM COATED ORAL NIGHTLY
Status: DISCONTINUED | OUTPATIENT
Start: 2024-01-25 | End: 2024-01-30 | Stop reason: HOSPADM

## 2024-01-25 RX ORDER — ENOXAPARIN SODIUM 100 MG/ML
40 INJECTION SUBCUTANEOUS DAILY
Status: DISCONTINUED | OUTPATIENT
Start: 2024-01-25 | End: 2024-01-30 | Stop reason: HOSPADM

## 2024-01-25 RX ORDER — FINASTERIDE 5 MG/1
5 TABLET, FILM COATED ORAL DAILY
Status: DISCONTINUED | OUTPATIENT
Start: 2024-01-25 | End: 2024-01-30 | Stop reason: HOSPADM

## 2024-01-25 RX ADMIN — ACETAMINOPHEN 650 MG: 325 TABLET ORAL at 21:39

## 2024-01-25 RX ADMIN — SODIUM CHLORIDE: 9 INJECTION, SOLUTION INTRAVENOUS at 21:23

## 2024-01-25 RX ADMIN — FERROUS SULFATE TAB 325 MG (65 MG ELEMENTAL FE) 325 MG: 325 (65 FE) TAB at 21:39

## 2024-01-25 RX ADMIN — WATER 1000 MG: 1 INJECTION INTRAMUSCULAR; INTRAVENOUS; SUBCUTANEOUS at 21:41

## 2024-01-25 RX ADMIN — METOPROLOL SUCCINATE 25 MG: 50 TABLET, EXTENDED RELEASE ORAL at 21:44

## 2024-01-25 RX ADMIN — SODIUM CHLORIDE 2500 ML: 9 INJECTION, SOLUTION INTRAVENOUS at 18:57

## 2024-01-25 RX ADMIN — ENOXAPARIN SODIUM 40 MG: 100 INJECTION SUBCUTANEOUS at 21:41

## 2024-01-25 RX ADMIN — CLOPIDOGREL BISULFATE 75 MG: 75 TABLET ORAL at 21:40

## 2024-01-25 RX ADMIN — CEFEPIME 1000 MG: 1 INJECTION, POWDER, FOR SOLUTION INTRAMUSCULAR; INTRAVENOUS at 19:02

## 2024-01-25 RX ADMIN — ASPIRIN 81 MG 81 MG: 81 TABLET ORAL at 21:40

## 2024-01-25 ASSESSMENT — PAIN DESCRIPTION - PAIN TYPE: TYPE: ACUTE PAIN

## 2024-01-25 ASSESSMENT — PAIN DESCRIPTION - LOCATION: LOCATION: SHOULDER

## 2024-01-25 ASSESSMENT — PAIN - FUNCTIONAL ASSESSMENT: PAIN_FUNCTIONAL_ASSESSMENT: 0-10

## 2024-01-25 ASSESSMENT — PAIN SCALES - GENERAL
PAINLEVEL_OUTOF10: 4
PAINLEVEL_OUTOF10: 5

## 2024-01-25 ASSESSMENT — PAIN DESCRIPTION - ORIENTATION: ORIENTATION: RIGHT

## 2024-01-25 NOTE — ED TRIAGE NOTES
Patient arrives via EMS with right shoulder pain, small skin tear to left middle finger, on blood thinner. GSC 15. No meds given PTA. Hx right hip replacement per EMS. Per EMS patient dropped his mail and fell trying to pick it up. Patient reports fatigue and states, \"I have not been feeling well recently\". Patient denies hitting head and denies LOC.

## 2024-01-26 ENCOUNTER — ANESTHESIA (OUTPATIENT)
Age: 89
End: 2024-01-26
Payer: MEDICARE

## 2024-01-26 ENCOUNTER — ANESTHESIA EVENT (OUTPATIENT)
Age: 89
End: 2024-01-26
Payer: MEDICARE

## 2024-01-26 ENCOUNTER — APPOINTMENT (OUTPATIENT)
Age: 89
DRG: 854 | End: 2024-01-26
Payer: MEDICARE

## 2024-01-26 LAB
ANION GAP SERPL CALCULATED.3IONS-SCNC: 12 MMOL/L (ref 3–16)
BASOPHILS # BLD: 0.04 K/UL (ref 0–0.2)
BASOPHILS # BLD: NORMAL K/UL (ref 0–0.2)
BASOPHILS NFR BLD: 0 %
BASOPHILS NFR BLD: NORMAL %
BUN SERPL-MCNC: 31 MG/DL (ref 7–20)
CALCIUM SERPL-MCNC: 8.8 MG/DL (ref 8.3–10.6)
CHLORIDE SERPL-SCNC: 110 MMOL/L (ref 99–110)
CO2 SERPL-SCNC: 22 MMOL/L (ref 21–32)
CREAT SERPL-MCNC: 1.3 MG/DL (ref 0.8–1.3)
EOSINOPHIL # BLD: 0.14 K/UL (ref 0–0.6)
EOSINOPHIL # BLD: NORMAL K/UL (ref 0–0.6)
EOSINOPHILS RELATIVE PERCENT: 1 %
EOSINOPHILS RELATIVE PERCENT: NORMAL %
ERYTHROCYTE [DISTWIDTH] IN BLOOD BY AUTOMATED COUNT: 13.4 % (ref 12.4–15.4)
ERYTHROCYTE [DISTWIDTH] IN BLOOD BY AUTOMATED COUNT: NORMAL % (ref 12.4–15.4)
GFR SERPL CREATININE-BSD FRML MDRD: 52 ML/MIN/1.73M2
GLUCOSE SERPL-MCNC: 122 MG/DL (ref 70–99)
HCT VFR BLD AUTO: 31 % (ref 40.5–52.5)
HCT VFR BLD AUTO: NORMAL % (ref 40.5–52.5)
HGB BLD-MCNC: 10.2 G/DL (ref 13.5–17.5)
HGB BLD-MCNC: NORMAL G/DL (ref 13.5–17.5)
IMM GRANULOCYTES # BLD AUTO: 0.12 K/UL (ref 0–0.5)
IMM GRANULOCYTES # BLD AUTO: NORMAL K/UL (ref 0–0.5)
IMM GRANULOCYTES NFR BLD: 1 %
IMM GRANULOCYTES NFR BLD: NORMAL %
LACTATE BLDV-SCNC: 1.3 MMOL/L (ref 0.4–2)
LACTATE BLDV-SCNC: 1.4 MMOL/L (ref 0.4–2)
LYMPHOCYTES NFR BLD: 1.08 K/UL (ref 1–5.1)
LYMPHOCYTES NFR BLD: NORMAL K/UL (ref 1–5.1)
LYMPHOCYTES RELATIVE PERCENT: 5 %
LYMPHOCYTES RELATIVE PERCENT: NORMAL %
MCH RBC QN AUTO: 33.4 PG (ref 26–34)
MCH RBC QN AUTO: NORMAL PG (ref 26–34)
MCHC RBC AUTO-ENTMCNC: 32.9 G/DL (ref 31–36)
MCHC RBC AUTO-ENTMCNC: NORMAL G/DL (ref 31–36)
MCV RBC AUTO: 101.6 FL (ref 80–100)
MCV RBC AUTO: NORMAL FL (ref 80–100)
MONOCYTES NFR BLD: 1.78 K/UL (ref 0–1.3)
MONOCYTES NFR BLD: 8 %
MONOCYTES NFR BLD: NORMAL %
MONOCYTES NFR BLD: NORMAL K/UL (ref 0–1.3)
NEUTROPHILS NFR BLD: 86 %
NEUTROPHILS NFR BLD: NORMAL %
NEUTS SEG NFR BLD: 19 K/UL (ref 1.7–7.7)
NEUTS SEG NFR BLD: NORMAL K/UL (ref 1.7–7.7)
OSMOLALITY UR: 318 MOSM/KG (ref 390–1070)
PLATELET # BLD AUTO: 587 K/UL (ref 135–450)
PLATELET # BLD AUTO: NORMAL K/UL (ref 135–450)
PMV BLD AUTO: 9.2 FL
PMV BLD AUTO: NORMAL FL
POTASSIUM SERPL-SCNC: 5.1 MMOL/L (ref 3.5–5.1)
RBC # BLD AUTO: 3.05 M/UL (ref 4.2–5.9)
RBC # BLD AUTO: NORMAL M/UL (ref 4.2–5.9)
SODIUM SERPL-SCNC: 144 MMOL/L (ref 136–145)
UUN UR-MCNC: 364 MG/DL (ref 800–1666)
WBC OTHER # BLD: 22.2 K/UL (ref 4–11)
WBC OTHER # BLD: NORMAL K/UL (ref 4–11)

## 2024-01-26 PROCEDURE — 85025 COMPLETE CBC W/AUTO DIFF WBC: CPT

## 2024-01-26 PROCEDURE — C1758 CATHETER, URETERAL: HCPCS | Performed by: UROLOGY

## 2024-01-26 PROCEDURE — 3700000001 HC ADD 15 MINUTES (ANESTHESIA): Performed by: UROLOGY

## 2024-01-26 PROCEDURE — 2709999900 HC NON-CHARGEABLE SUPPLY: Performed by: UROLOGY

## 2024-01-26 PROCEDURE — 0T778DZ DILATION OF LEFT URETER WITH INTRALUMINAL DEVICE, VIA NATURAL OR ARTIFICIAL OPENING ENDOSCOPIC: ICD-10-PCS | Performed by: UROLOGY

## 2024-01-26 PROCEDURE — 36415 COLL VENOUS BLD VENIPUNCTURE: CPT

## 2024-01-26 PROCEDURE — 1200000000 HC SEMI PRIVATE

## 2024-01-26 PROCEDURE — 6360000002 HC RX W HCPCS: Performed by: NURSE ANESTHETIST, CERTIFIED REGISTERED

## 2024-01-26 PROCEDURE — 3600000002 HC SURGERY LEVEL 2 BASE: Performed by: UROLOGY

## 2024-01-26 PROCEDURE — 83605 ASSAY OF LACTIC ACID: CPT

## 2024-01-26 PROCEDURE — 2580000003 HC RX 258: Performed by: STUDENT IN AN ORGANIZED HEALTH CARE EDUCATION/TRAINING PROGRAM

## 2024-01-26 PROCEDURE — 6360000002 HC RX W HCPCS: Performed by: ANESTHESIOLOGY

## 2024-01-26 PROCEDURE — 6370000000 HC RX 637 (ALT 250 FOR IP): Performed by: STUDENT IN AN ORGANIZED HEALTH CARE EDUCATION/TRAINING PROGRAM

## 2024-01-26 PROCEDURE — 2580000003 HC RX 258: Performed by: UROLOGY

## 2024-01-26 PROCEDURE — 7100000000 HC PACU RECOVERY - FIRST 15 MIN: Performed by: UROLOGY

## 2024-01-26 PROCEDURE — C1769 GUIDE WIRE: HCPCS

## 2024-01-26 PROCEDURE — 3700000000 HC ANESTHESIA ATTENDED CARE: Performed by: UROLOGY

## 2024-01-26 PROCEDURE — 7100000001 HC PACU RECOVERY - ADDTL 15 MIN: Performed by: UROLOGY

## 2024-01-26 PROCEDURE — 6360000002 HC RX W HCPCS: Performed by: STUDENT IN AN ORGANIZED HEALTH CARE EDUCATION/TRAINING PROGRAM

## 2024-01-26 PROCEDURE — 80048 BASIC METABOLIC PNL TOTAL CA: CPT

## 2024-01-26 PROCEDURE — C2617 STENT, NON-COR, TEM W/O DEL: HCPCS | Performed by: UROLOGY

## 2024-01-26 PROCEDURE — 51600 INJECTION FOR BLADDER X-RAY: CPT

## 2024-01-26 PROCEDURE — C1769 GUIDE WIRE: HCPCS | Performed by: UROLOGY

## 2024-01-26 PROCEDURE — 3600000012 HC SURGERY LEVEL 2 ADDTL 15MIN: Performed by: UROLOGY

## 2024-01-26 DEVICE — URETERAL STENT
Type: IMPLANTABLE DEVICE | Site: URETER | Status: FUNCTIONAL
Brand: CONTOUR™

## 2024-01-26 RX ORDER — SODIUM CHLORIDE 0.9 % (FLUSH) 0.9 %
5-40 SYRINGE (ML) INJECTION PRN
Status: DISCONTINUED | OUTPATIENT
Start: 2024-01-26 | End: 2024-01-26 | Stop reason: HOSPADM

## 2024-01-26 RX ORDER — MAGNESIUM HYDROXIDE 1200 MG/15ML
LIQUID ORAL PRN
Status: DISCONTINUED | OUTPATIENT
Start: 2024-01-26 | End: 2024-01-26 | Stop reason: ALTCHOICE

## 2024-01-26 RX ORDER — PROPOFOL 10 MG/ML
INJECTION, EMULSION INTRAVENOUS PRN
Status: DISCONTINUED | OUTPATIENT
Start: 2024-01-26 | End: 2024-01-26 | Stop reason: SDUPTHER

## 2024-01-26 RX ORDER — ONDANSETRON 2 MG/ML
4 INJECTION INTRAMUSCULAR; INTRAVENOUS
Status: DISCONTINUED | OUTPATIENT
Start: 2024-01-26 | End: 2024-01-26 | Stop reason: HOSPADM

## 2024-01-26 RX ORDER — LIDOCAINE HYDROCHLORIDE 20 MG/ML
INJECTION, SOLUTION INTRAVENOUS PRN
Status: DISCONTINUED | OUTPATIENT
Start: 2024-01-26 | End: 2024-01-26 | Stop reason: SDUPTHER

## 2024-01-26 RX ORDER — SODIUM CHLORIDE 9 MG/ML
INJECTION, SOLUTION INTRAVENOUS PRN
Status: DISCONTINUED | OUTPATIENT
Start: 2024-01-26 | End: 2024-01-26 | Stop reason: HOSPADM

## 2024-01-26 RX ORDER — SODIUM CHLORIDE 0.9 % (FLUSH) 0.9 %
5-40 SYRINGE (ML) INJECTION EVERY 12 HOURS SCHEDULED
Status: DISCONTINUED | OUTPATIENT
Start: 2024-01-26 | End: 2024-01-26 | Stop reason: HOSPADM

## 2024-01-26 RX ORDER — FENTANYL CITRATE 50 UG/ML
25 INJECTION, SOLUTION INTRAMUSCULAR; INTRAVENOUS EVERY 5 MIN PRN
Status: DISCONTINUED | OUTPATIENT
Start: 2024-01-26 | End: 2024-01-26 | Stop reason: HOSPADM

## 2024-01-26 RX ADMIN — SODIUM CHLORIDE: 9 INJECTION, SOLUTION INTRAVENOUS at 22:38

## 2024-01-26 RX ADMIN — PHENYLEPHRINE HYDROCHLORIDE 200 MCG: 10 INJECTION INTRAVENOUS at 10:35

## 2024-01-26 RX ADMIN — PHENYLEPHRINE HYDROCHLORIDE 200 MCG: 10 INJECTION INTRAVENOUS at 10:42

## 2024-01-26 RX ADMIN — SODIUM CHLORIDE: 9 INJECTION, SOLUTION INTRAVENOUS at 10:14

## 2024-01-26 RX ADMIN — HYDROMORPHONE HYDROCHLORIDE 0.5 MG: 1 INJECTION, SOLUTION INTRAMUSCULAR; INTRAVENOUS; SUBCUTANEOUS at 11:07

## 2024-01-26 RX ADMIN — PHENYLEPHRINE HYDROCHLORIDE 100 MCG: 10 INJECTION INTRAVENOUS at 10:55

## 2024-01-26 RX ADMIN — ASPIRIN 81 MG 81 MG: 81 TABLET ORAL at 12:05

## 2024-01-26 RX ADMIN — CETIRIZINE HYDROCHLORIDE 5 MG: 10 TABLET, FILM COATED ORAL at 12:05

## 2024-01-26 RX ADMIN — FENTANYL CITRATE 25 MCG: 50 INJECTION, SOLUTION INTRAMUSCULAR; INTRAVENOUS at 10:29

## 2024-01-26 RX ADMIN — FINASTERIDE 5 MG: 5 TABLET, FILM COATED ORAL at 12:05

## 2024-01-26 RX ADMIN — SODIUM CHLORIDE, PRESERVATIVE FREE 10 ML: 5 INJECTION INTRAVENOUS at 20:39

## 2024-01-26 RX ADMIN — FENTANYL CITRATE 25 MCG: 50 INJECTION, SOLUTION INTRAMUSCULAR; INTRAVENOUS at 10:23

## 2024-01-26 RX ADMIN — ACETAMINOPHEN 650 MG: 325 TABLET ORAL at 22:28

## 2024-01-26 RX ADMIN — METOPROLOL SUCCINATE 25 MG: 50 TABLET, EXTENDED RELEASE ORAL at 12:06

## 2024-01-26 RX ADMIN — SODIUM CHLORIDE, PRESERVATIVE FREE 10 ML: 5 INJECTION INTRAVENOUS at 20:40

## 2024-01-26 RX ADMIN — LIDOCAINE HYDROCHLORIDE 60 MG: 20 INJECTION, SOLUTION INTRAVENOUS at 10:23

## 2024-01-26 RX ADMIN — PROPOFOL 100 MCG/KG/MIN: 10 INJECTION, EMULSION INTRAVENOUS at 10:24

## 2024-01-26 RX ADMIN — WATER 1000 MG: 1 INJECTION INTRAMUSCULAR; INTRAVENOUS; SUBCUTANEOUS at 20:36

## 2024-01-26 RX ADMIN — TAMSULOSIN HYDROCHLORIDE 0.4 MG: 0.4 CAPSULE ORAL at 12:06

## 2024-01-26 RX ADMIN — PROPOFOL 50 MG: 10 INJECTION, EMULSION INTRAVENOUS at 10:23

## 2024-01-26 RX ADMIN — ENOXAPARIN SODIUM 40 MG: 100 INJECTION SUBCUTANEOUS at 12:05

## 2024-01-26 RX ADMIN — FLUTICASONE PROPIONATE 2 SPRAY: 50 SPRAY, METERED NASAL at 12:05

## 2024-01-26 RX ADMIN — ATORVASTATIN CALCIUM 40 MG: 40 TABLET, FILM COATED ORAL at 20:36

## 2024-01-26 RX ADMIN — CLOPIDOGREL BISULFATE 75 MG: 75 TABLET ORAL at 12:06

## 2024-01-26 RX ADMIN — PHENYLEPHRINE HYDROCHLORIDE 100 MCG: 10 INJECTION INTRAVENOUS at 10:30

## 2024-01-26 RX ADMIN — METOPROLOL SUCCINATE 25 MG: 50 TABLET, EXTENDED RELEASE ORAL at 20:37

## 2024-01-26 ASSESSMENT — PAIN SCALES - GENERAL
PAINLEVEL_OUTOF10: 0
PAINLEVEL_OUTOF10: 1
PAINLEVEL_OUTOF10: 8
PAINLEVEL_OUTOF10: 0

## 2024-01-26 ASSESSMENT — PAIN DESCRIPTION - DESCRIPTORS: DESCRIPTORS: ACHING

## 2024-01-26 ASSESSMENT — PAIN DESCRIPTION - LOCATION
LOCATION: LEG
LOCATION: PENIS

## 2024-01-26 NOTE — PROGRESS NOTES
Patient admitted to room 3202 from ED.  Patient oriented to room, call light, bed rails, phone, lights and bathroom. Bed alarm in place, patient aware of placement and reason.Bed alarm in place for patient safety. Bed locked, in lowest position, side rails up 2/4, call light within reach.  Will continue to monitor.  Alejandrina Ortiz RN    10:15 PM  Shift assessment complete. (See findings in flowsheet). Med pass complete. (See MAR). Urine collected per orders and sent to the lab via tube system. VSS. Patient c/o generalized pain in neck, shoulders, back. This is a chronic thing for him. PRN tylenol given. Heat packs applied. Alejandrina Ortiz RN

## 2024-01-26 NOTE — ANESTHESIA PRE PROCEDURE
Types: Cigarettes     Quit date: 2015     Years since quittin.9    Smokeless tobacco: Never   Substance Use Topics    Alcohol use: Yes     Alcohol/week: 0.0 standard drinks of alcohol     Comment: rare occasions                                Counseling given: Not Answered      Vital Signs (Current):   Vitals:    24 2030 24 2100 24 0422 24 0715   BP: 133/64 138/76 131/61 117/61   Pulse: 82 88 73 69   Resp: 18 18 19 18   Temp: 98.4 °F (36.9 °C) 98.4 °F (36.9 °C) 98.8 °F (37.1 °C) 98.4 °F (36.9 °C)   TempSrc: Oral Oral Oral Oral   SpO2: 98% 100% 95% 91%   Weight:       Height:                                                  BP Readings from Last 3 Encounters:   24 117/61   23 118/62   23 118/72       NPO Status:   >8hrs                                                                                BMI:   Wt Readings from Last 3 Encounters:   24 81.4 kg (179 lb 6.4 oz)   23 80.2 kg (176 lb 12.8 oz)   23 75.8 kg (167 lb 3.2 oz)     Body mass index is 24.33 kg/m².    CBC:   Lab Results   Component Value Date/Time    WBC 22.2 2024 07:31 AM    RBC 3.05 2024 07:31 AM    RBC 4.24 2016 10:54 AM    HGB 10.2 2024 07:31 AM    HCT 31.0 2024 07:31 AM    .6 2024 07:31 AM    RDW 13.4 2024 07:31 AM     2024 07:31 AM       CMP:   Lab Results   Component Value Date/Time     2024 07:31 AM    K 5.1 2024 07:31 AM     2024 07:31 AM    CO2 22 2024 07:31 AM    BUN 31 2024 07:31 AM    CREATININE 1.3 2024 07:31 AM    GFRAA >60 2022 10:33 AM    AGRATIO 0.8 2024 06:15 PM    AGRATIO 1.7 2023 08:24 AM    LABGLOM 52 2024 07:31 AM    GLUCOSE 122 2024 07:31 AM    GLUCOSE 106 2016 02:20 PM    PROT 7.2 2024 06:15 PM    PROT 6.9 2016 02:20 PM    CALCIUM 8.8 2024 07:31 AM    BILITOT 0.4 2024 06:15 PM    ALKPHOS 94

## 2024-01-26 NOTE — CARE COORDINATION
DC: Yes  Would you like Case Management to discuss the discharge plan with any other family members/significant others, and if so, who? Yes (Sue daughter POA)  Plans to Return to Present Housing: Yes  Other Identified Issues/Barriers to RETURNING to current housing: none   Potential Assistance needed at discharge: Home Care, Outpatient PT/OT            Potential DME:    Patient expects to discharge to: House  Plan for transportation at discharge:      Financial    Payor: [employer] MEDICARE / Plan: [employer] MEDICARE-ADVANTAGE PPO / Product Type: Medicare /     Does insurance require precert for SNF: Yes    Potential assistance Purchasing Medications: No  Meds-to-Beds request:        [id number] - [place], [place] - [address] - P [phone] - F [phone]  [address]  [facility] [address]  Phone: [phone] Fax: [phone]    [facility] - [place], [place] - [address] [place] [address] -  [phone] - F [phone]  [address] [place] [address]  Phone: [phone] Fax: [phone]      Notes:    Factors facilitating achievement of predicted outcomes: Family support    Barriers to discharge: none     Additional Case Management Notes: Spoke with pt at bedside.  IPTA from home with wife.  Has used Home Care in the past.  [facility] preferred.  If IV abx needed, [facility] preferred. Daughter Sue is POA and would like to be involved in d/c planning, she works for [employer].  No current DME needs.  CM will follow.     The Plan for Transition of Care is related to the following treatment goals of Complicated urinary tract infection [N39.0]  VIR[place]IA (acute kidney injury) (HCC) [N17.9]  Acute cystitis without hematuria [N30.00]  Calculus of distal ureter [N20.1]    IF APPLICABLE: The Patient and/or patient representative oRd and his family were provided with a choice of provider and agrees with the discharge plan. Freedom of choice list with basic  dialogue that supports the patient's individualized plan of care/goals and shares the quality data associated with the providers was provided to:     Patient Representative Name:       The Patient and/or Patient Representative Agree with the Discharge Plan?      Alexandria Espinoza  Case Management Department  Ph: [phone]

## 2024-01-26 NOTE — PROGRESS NOTES
Hospitalist Progress Note      Name:  Rod Rodriguez /Age/Sex: 1935  (88 y.o. male)   MRN & CSN:  [id number] & [id number] Admission Date/Time: 2024  5:41 PM   Location:  [id number]- PCP: Esme Sullivan MD         Hospital Day: 2    Assessment and Plan:   Rod Rodriguez is a 88 y.o.  male with past medical history of BPH, nondistended anemia, chronic HFpEF, cardiomyopathy, status post ICD, status post TAVR, hypertension, history of CAD, history of Prinzmetal angina, was admitted on 2024 for evaluation of generalized fatigue since past couple of days.  Patient was recently treated with antibiotics for UTI, completed course 2023.  Patient again noted to have abnormal urinalysis history of UTI along with leukocytosis/fever.  Also noted to have VIR[place]IA. , CT abdomen shows 8 mm distal left ureteral calculus without hydronephrosis urology consulted.  It also showed focal wall thickening of the sigmoid colon concerning for mass.  GI consulted.    Assessment    Acute cystitis  Distal left ureteral calculus  VIRGINIA - Improved  Focal thickening sigmoid colon  Chronic HFpEF-compensated  Status post TAVR  History of CAD  History of Prinzmetal angina  Hypertension    Plan    Continue IV Rocephin  Monitor urine culture  Urology eval appreciated. Patient to OR today for cystoscopy/L ureteral stent insertion.   Continue IV fluids.  Monitor renal function.  Holding lisinopril today.  Creatinine appears to be at baseline.  Continue IV hydralazine as needed  Continue aspirin/Plavix/statin therapy      Diet ADULT DIET; Regular   DVT Prophylaxis [x] Lovenox, []  Heparin, [] SCDs, [] Ambulation   GI Prophylaxis [] PPI,  [] H2 Blocker,  [] Carafate,  [] Diet/Tube Feeds   Code Status Full Code   Disposition Patient requires continued admission due to IV Abx / Urology w/u   MDM [] Low, [] Moderate,[x]  High  Patient's risk as above      History of Present Illness:     Chief Complaint: Complicated urinary

## 2024-01-26 NOTE — PROGRESS NOTES
Pt transferred to room 3202 in stable condition from PACU.  Bedside report given to Gaviota SHANE.

## 2024-01-26 NOTE — BRIEF OP NOTE
Brief Postoperative Note      Patient: Rod Rodriguez  YOB: 1935  MRN: [id number]    Date of Procedure: 1/26/2024    Pre-Op Diagnosis Codes:     * Kidney stone on left side [N20.0], UTI    Post-Op Diagnosis: Same       Procedure(s):  CYSTOSCOPY URETERAL STENT INSERTION    Surgeon(s):  Galo Nair MD    Assistant:  * No surgical staff found *    Anesthesia: General    Estimated Blood Loss (mL): Minimal    Complications: None    Specimens:   * No specimens in log *    Implants:  * No implants in log *      Drains:   Ureteral Drain/Stent 01/26/24 Left Ureter (Active)       Findings: left distal ureteral stone    Plan- home eventually on antibiotics, will need left ureteroscopy in a few weeks      Electronically signed by Galo Nair MD on 1/26/2024 at 11:01 AM   Onset: couple weeks,   Location / description: sleeping a lot, weak, no energy  Associated Symptoms: no fevers, lack of appetite, loose stools couple times a week    Reason for Disposition  • [1] MODERATE weakness (i.e., interferes with work, school, normal activities) AND [2] cause unknown  (Exceptions: weakness with acute minor illness, or weakness from poor fluid intake)    Protocols used: WEAKNESS (GENERALIZED) AND FATIGUE-A-AH    Patient advised to be seen in Urgent care or ED due to his symptoms. Wife states that patient will not go and will only go see Dr. Lake.     Please Advise.     Wife, Asha, can be reached at [phone]

## 2024-01-26 NOTE — ED NOTES
Spoke with Yaritza in Micro lab at [facility] about a positive culture of Kleb Pneumo. Yaritza was given patient room number and floor number to call to give results.

## 2024-01-26 NOTE — ED PROVIDER NOTES
I PERSONALLY SAW THE PATIENT AND PERFORMED A SUBSTANTIVE PORTION OF THE VISIT INCLUDING ALL ASPECTS OF THE MEDICAL DECISION MAKING PROCESS.    [facility] 3 MED/SURG  EMERGENCY DEPARTMENT ENCOUNTER      Pt Name: Rod Rodriguez  MRN: [id number]  Birthdate 1935  Date of evaluation: 1/25/2024  Provider: Ryan Dang MD    CHIEF COMPLAINT       Chief Complaint   Patient presents with    Fatigue    Fall       HISTORY OF PRESENT ILLNESS    Rod Rodriguez is a 88 y.o. male who presents to the emergency department with general fatigue.  Patient presents with general fatigue from home.  Positive for mechanical fall.  Positive for general weakness fatigue.  Exertional shortness of breath.  History of UTIs.  No other associated symptoms    Nursing Notes were reviewed. Including nursing noted for FM, Surgical History, Past Medical History, Social History, vitals, and allergies; agree with all.     REVIEW OF SYSTEMS       Review of Systems    Except as noted above the remainder of the review of systems was reviewed and negative.     PAST MEDICAL HISTORY     Past Medical History:   Diagnosis Date    Aortic valve disease     Arthritis     BPH (benign prostatic hyperplasia)     Coronary artery vasospasm (HCC)     Diverticulosis     H/O squamous cell carcinoma of skin     Hematuria 7/26/2017    HTN (hypertension)     Hyperlipidemia     Mixed hyperlipidemia 1/7/2016    Osteoarthritis of right knee     PSA elevation        SURGICAL HISTORY       Past Surgical History:   Procedure Laterality Date    APPENDECTOMY  1970    CARDIAC PACEMAKER PLACEMENT  12/2016    [facility]AMPBELL    COSMETIC SURGERY  2010    skin cancer - base of nose    HERNIA REPAIR  2011    JOINT REPLACEMENT  7/21/2015    Nathan CAMPBELL bilat knees    LAPAROTOMY  1970    exploratoryt for perf diverticulum    NERVE BLOCK Bilateral 1/6/2021    BILATERAL L3,L4,L5 DORSAL RAMUS MEDIAL BRANCH BLOCKS WITH FLUOROSCOPY performed by Gloria Lance MD at [facility]    NERVE BLOCK Bilateral

## 2024-01-26 NOTE — PROGRESS NOTES
4 Eyes Skin Assessment     The patient is being assess for  Admission    I agree that 2 RN's have performed a thorough Head to Toe Skin Assessment on the patient. ALL assessment sites listed below have been assessed.       Areas assessed by both nurses:  [x]   Head, Face, and Ears   [x]   Shoulders, Back, and Chest  [x]   Arms, Elbows, and Hands   [x]   Coccyx, Sacrum, and Ischum  [x]   Legs, Feet, and Heels        Does the Patient have Skin Breakdown?  Yes. Multiple skin tears, abraisions and bruises. Areas of concern documented in flowsheets.        Patel Prevention initiated:  Yes   Wound Care Orders initiated:  No      [facility] nurse consulted for Pressure Injury (Stage 3,4, Unstageable, DTI, NWPT, and Complex wounds):  No      Nurse 1 eSignature: Electronically signed by Alejandrina Ortiz RN on 1/25/24 at 10:56 PM EST    **SHARE this note so that the co-signing nurse is able to place an eSignature**    Nurse 2 eSignature: Electronically signed by Cary Hightower RN on 1/25/24 at 10:59 PM EST

## 2024-01-26 NOTE — ANESTHESIA POSTPROCEDURE EVALUATION
Department of Anesthesiology  Postprocedure Note    Patient: Rod Rodriguez  MRN: [id number]  YOB: 1935  Date of evaluation: 1/26/2024    Procedure Summary       Date: 01/26/24 Room / Location: [facility]    Anesthesia Start: 1018 Anesthesia Stop: 1059    Procedure: CYSTOSCOPY URETERAL STENT INSERTION (Left: Bladder) Diagnosis:       Kidney stone on left side      (Kidney stone on left side [N20.0])    Surgeons: Galo Nair MD Responsible Provider: Love Millan MD    Anesthesia Type: MAC ASA Status: 3            Anesthesia Type: No value filed.    Kallie Phase I: Kallie Score: 10    Kallie Phase II:      Anesthesia Post Evaluation    Patient location during evaluation: PACU  Patient participation: complete - patient participated  Level of consciousness: awake and alert  Airway patency: patent  Nausea & Vomiting: no nausea and no vomiting  Cardiovascular status: hemodynamically stable  Respiratory status: acceptable  Hydration status: stable  Pain management: adequate        No notable events documented.

## 2024-01-26 NOTE — PROGRESS NOTES
Called in urology consult. Patient to be seen by today.    (0) understands/communicates without difficulty

## 2024-01-26 NOTE — PLAN OF CARE
Problem: Pain  Goal: Verbalizes/displays adequate comfort level or baseline comfort level  Outcome: Progressing  Flowsheets (Taken 1/26/2024 0339)  Verbalizes/displays adequate comfort level or baseline comfort level:   Encourage patient to monitor pain and request assistance   Assess pain using appropriate pain scale   Administer analgesics based on type and severity of pain and evaluate response   Implement non-pharmacological measures as appropriate and evaluate response     Problem: Skin/Tissue Integrity  Goal: Absence of new skin breakdown  Description: 1.  Monitor for areas of redness and/or skin breakdown  2.  Assess vascular access sites hourly  3.  Every 4-6 hours minimum:  Change oxygen saturation probe site  4.  Every 4-6 hours:  If on nasal continuous positive airway pressure, respiratory therapy assess nares and determine need for appliance change or resting period.  Outcome: Progressing     Problem: Safety - Adult  Goal: Free from fall injury  Outcome: Progressing  Flowsheets (Taken 1/26/2024 0339)  Free From Fall Injury: Instruct family/caregiver on patient safety     Problem: ABCDS Injury Assessment  Goal: Absence of physical injury  Outcome: Progressing  Flowsheets (Taken 1/26/2024 0339)  Absence of Physical Injury: Implement safety measures based on patient assessment     Problem: Discharge Planning  Goal: Discharge to home or other facility with appropriate resources  Outcome: Progressing  Flowsheets (Taken 1/26/2024 0339)  Discharge to home or other facility with appropriate resources:   Identify barriers to discharge with patient and caregiver   Arrange for needed discharge resources and transportation as appropriate   Identify discharge learning needs (meds, wound care, etc)

## 2024-01-26 NOTE — CONSULTS
Urology Attending Consult Note      Reason for Consultation: Distal stone    History: 89 yo M with history of TAVR/CAD on plavix with pacemaker who presented to the ER last night with complaint of general fatigue. Had been dealing with UTIs prior to his visit to the ER. CT scan revealed an 8mm L distal ureteral stone. WBC elevated to 23, UA showing possible infection. Cr elevated to 1.6. He was admitted for further management. He has no prior history of nephrolithiasis.     Family History, Social History, Review of Systems:  Reviewed and agreed to as per chart    Vitals:  /61   Pulse 69   Temp 98.4 °F (36.9 °C) (Oral)   Resp 18   Ht 1.829 m (6')   Wt 81.4 kg (179 lb 6.4 oz)   SpO2 91%   BMI 24.33 kg/m²   Temp  Av.7 °F (37.1 °C)  Min: 98.4 °F (36.9 °C)  Max: 99.4 °F (37.4 °C)    Intake/Output Summary (Last 24 hours) at 2024 0942  Last data filed at 2024 0331  Gross per 24 hour   Intake 60 ml   Output 650 ml   Net -590 ml         Physical:  Well developed, well nourished in no acute distress  Mood indicates no abnormalities. Pt doesn’t appear depressed  Orientated to time and place          Labs:  WBC:    Lab Results   Component Value Date/Time    WBC 22.2 2024 07:31 AM     Hemoglobin/Hematocrit:    Lab Results   Component Value Date/Time    HGB 10.2 2024 07:31 AM    HCT 31.0 2024 07:31 AM     BMP:    Lab Results   Component Value Date/Time     2024 07:31 AM    K 5.1 2024 07:31 AM     2024 07:31 AM    CO2 22 2024 07:31 AM    BUN 31 2024 07:31 AM    LABALBU 3.3 2024 06:15 PM    CREATININE 1.3 2024 07:31 AM    CALCIUM 8.8 2024 07:31 AM    GFRAA >60 2022 10:33 AM    LABGLOM 52 2024 07:31 AM     PT/INR:    Lab Results   Component Value Date/Time    PROTIME  10/31/2022 07:39 AM     Specimen not received within 7 days. AUTOMATIC FILE CLEANUP CANCEL.    INR  10/31/2022 07:39 AM     Specimen not received

## 2024-01-26 NOTE — ED NOTES
(0)   Vitals:    01/25/24 1930 01/25/24 1945 01/25/24 2000 01/25/24 2015   BP: (!) 132/92 (!) 123/47 (!) 140/83 131/60   Pulse: 84 89 91 90   Resp: 14 15 21 13   Temp:       TempSrc:       SpO2: 95% 98%     Weight:       Height:         FiO2 (%): room air  O2 Flow Rate: O2 Device: Nasal cannula    Cardiac Rhythm:    Pain Assessment: 0 [x] Verbal [] Carver Maddox Scale  Pain Scale: Pain Assessment  Pain Assessment: 0-10  Pain Level: 5  Patient's Stated Pain Goal: 0 - No pain  Pain Location: Shoulder  Pain Orientation: Right  Pain Type: Acute pain  Last documented pain score (0-10 scale) Pain Level: 5  Last documented pain medication administered: n/a  Mental Status: oriented, alert, coherent, logical, thought processes intact, and able to concentrate and follow conversation  Orientation Level:    NIH Score:    C-SSRS: Risk of Suicide: No Risk  Bedside swallow: 3 oz Water Swallow Screen: Pass  Eric Coma Scale (GCS): Eric Coma Scale  Eye Opening: Spontaneous  Best Verbal Response: Oriented  Best Motor Response: Obeys commands  Eric Coma Scale Score: 15  Active LDA's:   Peripheral IV 01/25/24 Left Antecubital (Active)   Site Assessment Clean, dry & intact 01/25/24 1744   Line Status Blood return noted 01/25/24 1744   Phlebitis Assessment No symptoms 01/25/24 1744   Infiltration Assessment 0 01/25/24 1744   Dressing Status New dressing applied 01/25/24 1744   Dressing Type Transparent 01/25/24 1744   Dressing Intervention New 01/25/24 1744       Peripheral IV 01/25/24 Right Antecubital (Active)   Site Assessment Clean, dry & intact 01/25/24 1908   Line Status Brisk blood return;Flushed 01/25/24 1908   Phlebitis Assessment No symptoms 01/25/24 1908   Infiltration Assessment 0 01/25/24 1908   Dressing Status Clean, dry & intact;New dressing applied 01/25/24 1908   Dressing Type Transparent 01/25/24 1908   Dressing Intervention New 01/25/24 1908     PO Status: NPO at midnight  Pertinent or High Risk Medications/Drips:  no   If Yes, please provide details:   Pending Blood Product Administration: no       You may also review the ED PT Care Timeline found under the Summary Nursing Index tab.    Recommendation    Pending orders see Inpatient orders. ED orders complete.   Plan for Discharge (if known):   Additional Comments: Patient is from home, voids in urinal in bed, ambulates with walker at home, a/oX4. #22 PIV RAC placed in ED, #20PIV LAC placed by squad. Cefepime administered in ED. 2.4L Bolus administered in ED. OK for patient to eat until midnight per ER MD. Patient needs soft foods due to missing teeth - prefers jello. Patient with pacemaker.   If any further questions, please call Sending RN at [phone]    Electronically signed by: Electronically signed by Sigrid Pham RN on 1/25/2024 at 8:23 PM

## 2024-01-27 PROBLEM — R65.20 SEPSIS WITH ACUTE RENAL FAILURE AND TUBULAR NECROSIS WITHOUT SEPTIC SHOCK (HCC): Status: ACTIVE | Noted: 2024-01-27

## 2024-01-27 PROBLEM — Z95.810 AICD (AUTOMATIC CARDIOVERTER/DEFIBRILLATOR) PRESENT: Status: ACTIVE | Noted: 2024-01-27

## 2024-01-27 PROBLEM — N40.1 BENIGN PROSTATIC HYPERPLASIA WITH LOWER URINARY TRACT SYMPTOMS: Status: ACTIVE | Noted: 2024-01-27

## 2024-01-27 PROBLEM — N17.9 AKI (ACUTE KIDNEY INJURY) (HCC): Status: ACTIVE | Noted: 2024-01-27

## 2024-01-27 PROBLEM — R78.81 BACTEREMIA DUE TO KLEBSIELLA PNEUMONIAE: Status: ACTIVE | Noted: 2024-01-27

## 2024-01-27 PROBLEM — B96.1 BACTEREMIA DUE TO KLEBSIELLA PNEUMONIAE: Status: ACTIVE | Noted: 2024-01-27

## 2024-01-27 PROBLEM — D72.9 NEUTROPHILIA: Status: ACTIVE | Noted: 2024-01-27

## 2024-01-27 PROBLEM — D72.828 NEUTROPHILIA: Status: ACTIVE | Noted: 2024-01-27

## 2024-01-27 PROBLEM — N20.1 CALCULUS OF DISTAL URETER: Status: ACTIVE | Noted: 2024-01-27

## 2024-01-27 PROBLEM — N30.00 ACUTE CYSTITIS WITHOUT HEMATURIA: Status: ACTIVE | Noted: 2024-01-27

## 2024-01-27 PROBLEM — K63.89 COLONIC MASS: Status: ACTIVE | Noted: 2024-01-27

## 2024-01-27 PROBLEM — N17.0 SEPSIS WITH ACUTE RENAL FAILURE AND TUBULAR NECROSIS WITHOUT SEPTIC SHOCK (HCC): Status: ACTIVE | Noted: 2024-01-27

## 2024-01-27 PROBLEM — A41.9 SEPSIS WITH ACUTE RENAL FAILURE AND TUBULAR NECROSIS WITHOUT SEPTIC SHOCK (HCC): Status: ACTIVE | Noted: 2024-01-27

## 2024-01-27 LAB
ANION GAP SERPL CALCULATED.3IONS-SCNC: 14 MMOL/L (ref 3–16)
BASOPHILS # BLD: 0.04 K/UL (ref 0–0.2)
BASOPHILS NFR BLD: 0 %
BUN SERPL-MCNC: 29 MG/DL (ref 7–20)
CALCIUM SERPL-MCNC: 8.9 MG/DL (ref 8.3–10.6)
CHLORIDE SERPL-SCNC: 111 MMOL/L (ref 99–110)
CO2 SERPL-SCNC: 21 MMOL/L (ref 21–32)
CREAT SERPL-MCNC: 1.3 MG/DL (ref 0.8–1.3)
EOSINOPHIL # BLD: 0.15 K/UL (ref 0–0.6)
EOSINOPHILS RELATIVE PERCENT: 1 %
ERYTHROCYTE [DISTWIDTH] IN BLOOD BY AUTOMATED COUNT: 13.5 % (ref 12.4–15.4)
FERRITIN SERPL-MCNC: 1207 NG/ML (ref 30–400)
GFR SERPL CREATININE-BSD FRML MDRD: 53 ML/MIN/1.73M2
GLUCOSE SERPL-MCNC: 124 MG/DL (ref 70–99)
HCT VFR BLD AUTO: 32.5 % (ref 40.5–52.5)
HGB BLD-MCNC: 10.7 G/DL (ref 13.5–17.5)
IMM GRANULOCYTES # BLD AUTO: 0.09 K/UL (ref 0–0.5)
IMM GRANULOCYTES NFR BLD: 1 %
IRON SATN MFR SERPL: 13 % (ref 20–50)
IRON SERPL-MCNC: 19 UG/DL (ref 59–158)
LYMPHOCYTES NFR BLD: 1.5 K/UL (ref 1–5.1)
LYMPHOCYTES RELATIVE PERCENT: 9 %
MCH RBC QN AUTO: 33.5 PG (ref 26–34)
MCHC RBC AUTO-ENTMCNC: 32.9 G/DL (ref 31–36)
MCV RBC AUTO: 101.9 FL (ref 80–100)
MICROORGANISM SPEC CULT: ABNORMAL
MONOCYTES NFR BLD: 1.39 K/UL (ref 0–1.3)
MONOCYTES NFR BLD: 8 %
NEUTROPHILS NFR BLD: 81 %
NEUTS SEG NFR BLD: 13.38 K/UL (ref 1.7–7.7)
PLATELET # BLD AUTO: 602 K/UL (ref 135–450)
PMV BLD AUTO: 9.2 FL
POTASSIUM SERPL-SCNC: 4.7 MMOL/L (ref 3.5–5.1)
RBC # BLD AUTO: 3.19 M/UL (ref 4.2–5.9)
SODIUM SERPL-SCNC: 145 MMOL/L (ref 136–145)
SPECIMEN DESCRIPTION: ABNORMAL
TIBC SERPL-MCNC: 146 UG/DL (ref 260–445)
WBC OTHER # BLD: 16.6 K/UL (ref 4–11)

## 2024-01-27 PROCEDURE — 6360000002 HC RX W HCPCS: Performed by: INTERNAL MEDICINE

## 2024-01-27 PROCEDURE — 82728 ASSAY OF FERRITIN: CPT

## 2024-01-27 PROCEDURE — 82746 ASSAY OF FOLIC ACID SERUM: CPT

## 2024-01-27 PROCEDURE — 82607 VITAMIN B-12: CPT

## 2024-01-27 PROCEDURE — 6360000002 HC RX W HCPCS: Performed by: STUDENT IN AN ORGANIZED HEALTH CARE EDUCATION/TRAINING PROGRAM

## 2024-01-27 PROCEDURE — 99223 1ST HOSP IP/OBS HIGH 75: CPT | Performed by: INTERNAL MEDICINE

## 2024-01-27 PROCEDURE — 6370000000 HC RX 637 (ALT 250 FOR IP): Performed by: STUDENT IN AN ORGANIZED HEALTH CARE EDUCATION/TRAINING PROGRAM

## 2024-01-27 PROCEDURE — 87040 BLOOD CULTURE FOR BACTERIA: CPT

## 2024-01-27 PROCEDURE — 6370000000 HC RX 637 (ALT 250 FOR IP): Performed by: INTERNAL MEDICINE

## 2024-01-27 PROCEDURE — 2580000003 HC RX 258: Performed by: INTERNAL MEDICINE

## 2024-01-27 PROCEDURE — 1200000000 HC SEMI PRIVATE

## 2024-01-27 PROCEDURE — 83550 IRON BINDING TEST: CPT

## 2024-01-27 PROCEDURE — 36415 COLL VENOUS BLD VENIPUNCTURE: CPT

## 2024-01-27 PROCEDURE — 83540 ASSAY OF IRON: CPT

## 2024-01-27 PROCEDURE — 80048 BASIC METABOLIC PNL TOTAL CA: CPT

## 2024-01-27 PROCEDURE — 2580000003 HC RX 258: Performed by: STUDENT IN AN ORGANIZED HEALTH CARE EDUCATION/TRAINING PROGRAM

## 2024-01-27 PROCEDURE — 85025 COMPLETE CBC W/AUTO DIFF WBC: CPT

## 2024-01-27 RX ORDER — LISINOPRIL 10 MG/1
10 TABLET ORAL DAILY
Status: DISCONTINUED | OUTPATIENT
Start: 2024-01-27 | End: 2024-01-30 | Stop reason: HOSPADM

## 2024-01-27 RX ADMIN — FINASTERIDE 5 MG: 5 TABLET, FILM COATED ORAL at 08:35

## 2024-01-27 RX ADMIN — ACETAMINOPHEN 650 MG: 325 TABLET ORAL at 23:01

## 2024-01-27 RX ADMIN — ASPIRIN 81 MG 81 MG: 81 TABLET ORAL at 08:36

## 2024-01-27 RX ADMIN — METOPROLOL SUCCINATE 25 MG: 50 TABLET, EXTENDED RELEASE ORAL at 08:36

## 2024-01-27 RX ADMIN — LISINOPRIL 10 MG: 10 TABLET ORAL at 11:35

## 2024-01-27 RX ADMIN — SODIUM CHLORIDE, PRESERVATIVE FREE 10 ML: 5 INJECTION INTRAVENOUS at 08:39

## 2024-01-27 RX ADMIN — CEFTRIAXONE SODIUM 2000 MG: 2 INJECTION, POWDER, FOR SOLUTION INTRAMUSCULAR; INTRAVENOUS at 11:45

## 2024-01-27 RX ADMIN — METOPROLOL SUCCINATE 25 MG: 50 TABLET, EXTENDED RELEASE ORAL at 19:57

## 2024-01-27 RX ADMIN — CEFTRIAXONE SODIUM 2000 MG: 2 INJECTION, POWDER, FOR SOLUTION INTRAMUSCULAR; INTRAVENOUS at 22:53

## 2024-01-27 RX ADMIN — ATORVASTATIN CALCIUM 40 MG: 40 TABLET, FILM COATED ORAL at 19:56

## 2024-01-27 RX ADMIN — SODIUM CHLORIDE, PRESERVATIVE FREE 10 ML: 5 INJECTION INTRAVENOUS at 19:58

## 2024-01-27 RX ADMIN — CETIRIZINE HYDROCHLORIDE 5 MG: 10 TABLET, FILM COATED ORAL at 08:35

## 2024-01-27 RX ADMIN — CLOPIDOGREL BISULFATE 75 MG: 75 TABLET ORAL at 08:36

## 2024-01-27 RX ADMIN — ENOXAPARIN SODIUM 40 MG: 100 INJECTION SUBCUTANEOUS at 08:37

## 2024-01-27 RX ADMIN — SODIUM CHLORIDE: 9 INJECTION, SOLUTION INTRAVENOUS at 11:38

## 2024-01-27 RX ADMIN — TAMSULOSIN HYDROCHLORIDE 0.4 MG: 0.4 CAPSULE ORAL at 08:36

## 2024-01-27 ASSESSMENT — PAIN SCALES - GENERAL
PAINLEVEL_OUTOF10: 0
PAINLEVEL_OUTOF10: 0
PAINLEVEL_OUTOF10: 3
PAINLEVEL_OUTOF10: 0

## 2024-01-27 NOTE — PROGRESS NOTES
Pt Name: Rod Rodriguez  Medical Record Number: [id number]  Date of Birth 1935   Today's Date: 1/27/2024      Subjective:  Awake in bed, feeling much better than admission. C/o urinary urgency and frequency, new urge incontinence - having trouble getting the urinal in time. Discussed expected ureteral stent side effects.     ROS: Constitutional: No fever    Vitals:  Vitals:    01/27/24 0702 01/27/24 0832 01/27/24 0845 01/27/24 1128   BP: 126/63 (!) 146/117  (!) 117/57   Pulse: 62 73  74   Resp: 16 16  16   Temp:   98.2 °F (36.8 °C) 98.2 °F (36.8 °C)   TempSrc: Oral  Oral Oral   SpO2: 93% 98%  92%   Weight:       Height:         I/O last 3 completed shifts:  In: 740 [P.O.:240; I.V.:500]  Out: 1200 [Urine:1200]    Exam:  General: Awake, oriented, no acute distress  Respiratory: Nonlabored breathing  Abdomen: Soft, non-tender, non-distended, no masses  : spontaneously voiding  Skin: Skin color, texture, turgor normal, no rashes or lesions  Neurologic: no gross deficits    CURRENT MEDICATIONS   Scheduled Meds:   lisinopril  10 mg Oral Daily    cefTRIAXone (ROCEPHIN) IV  2,000 mg IntraVENous Q12H    aspirin  81 mg Oral Daily    atorvastatin  40 mg Oral Nightly    clopidogrel  75 mg Oral Daily    ferrous sulfate  325 mg Oral Every Other Day    finasteride  5 mg Oral Daily    fluticasone  2 spray Nasal Daily    cetirizine  5 mg Oral Daily    metoprolol succinate  25 mg Oral BID    tamsulosin  0.4 mg Oral Daily    sodium chloride flush  5-40 mL IntraVENous 2 times per day    enoxaparin  40 mg SubCUTAneous Daily     Continuous Infusions:   sodium chloride      sodium chloride 75 mL/hr at 01/27/24 1138     PRN Meds:.sodium chloride flush, sodium chloride, ondansetron **OR** ondansetron, acetaminophen **OR** acetaminophen, polyethylene glycol    LABS     Recent Labs     01/25/24  1815 01/26/24  0542 01/26/24  0731 01/27/24  0526   WBC 23.3* PLEASE DISREGARD RESULTS.  SPECIMEN CONTAMINATED. 22.2* 16.6*   HGB 10.7*

## 2024-01-27 NOTE — PLAN OF CARE
Problem: Pain  Goal: Verbalizes/displays adequate comfort level or baseline comfort level  1/26/2024 2333 by Cary Hightower, RN  Outcome: Progressing  1/26/2024 2333 by Cary Hightower, RN  Outcome: Progressing     Problem: Safety - Adult  Goal: Free from fall injury  1/26/2024 2333 by Cary Hightower, RN  Outcome: Progressing  1/26/2024 2333 by Cary Hightower, RN  Outcome: Progressing

## 2024-01-27 NOTE — CONSULTS
Infectious Diseases Inpatient Consult Note      Reason for Consult:  Sepsis, Klebsiella Bacteremia and complicated UTI     Requesting Physician:       Primary Care Physician:  Esme Sullivan MD    History Obtained From:  Epic     CHIEF COMPLAINT:     Chief Complaint   Patient presents with    Fatigue    Fall         HISTORY OF PRESENT ILLNESS:  88 y.o. man with a history of diabetes, COPD coronary artery disease BPH, history of TAVR, AICD in situ admitted to hospital secondary to fatigue fall not feeling well found to be in sepsis with elevated WBC count, at 23.3 with left shift hemoglobin 10.7 .6, was in acute kidney injury with creatinine elevation up to 1.6 lactic acid 1.3 procalcitonin 0.76 urinalysis very abnormal with 3+ bacteria large leuk esterase, urine culture positive for Klebsiella pneumonia, blood culture from admission now positive for Klebsiella pneumonia.  CT head negative CT chest abdomen pelvis indicated 8 mm distal left ureteral calculus but no hydronephrosis extensive sigmoid diverticulosis mild prostate gland emphysematous changes noted in both lungs.  He was evaluated by urology was taken for left ureteral stent placement and cystoscopy on 1/26/24 by Dr. Nair.  Tmax 99.4, given the need for IV antibiotic we are consulted for recommendations    He was recently admitted to [facility] from 12/17/23 to 12/29/23, for septic shock secondary to urinary tract infection blood culture was positive for Klebsiella as well as urine culture was positive for Klebsiella on that admission he underwent ultrasound of the kidney on that admission but no CT scan performed he was treated with a course of ceftriaxone was discharged home on Omnicef.  Notes and results were reviewed in detail under care everywhere      Past Medical History:    Past Medical History:   Diagnosis Date    Aortic valve disease     Arthritis     BPH (benign prostatic hyperplasia)     Coronary artery

## 2024-01-27 NOTE — PROGRESS NOTES
Patient AOX4  Vitals stable   Room air   Denies nausea / Pain controled with PRN pain meds.   Patient up standby.  Voiding clear, yellow urine, no BM this shift.  IV fluids infusing per order    Bed locked and in low position, alarm on, wearing gripper socks when ambulating, side table and call light within reach.

## 2024-01-27 NOTE — PROGRESS NOTES
Hospitalist Progress Note      Name:  Rod Rodriguez /Age/Sex: 1935  (88 y.o. male)   MRN & CSN:  [id number] & [id number] Admission Date/Time: 2024  5:41 PM   Location:  [id number]- PCP: Esme Sullivan MD         Hospital Day: 3    Assessment and Plan:   Rod Rodriguez is a 88 y.o.  male with past medical history of BPH, nondistended anemia, chronic HFpEF, cardiomyopathy, status post ICD, status post TAVR, hypertension, history of CAD, history of Prinzmetal angina, was admitted on 2024 for evaluation of generalized fatigue since past couple of days.  Patient was recently treated with antibiotics for UTI, completed course 2023.  Patient again noted to have abnormal urinalysis history of UTI along with leukocytosis/fever.  Also noted to have [place]. , CT abdomen shows 8 mm distal left ureteral calculus without hydronephrosis urology consulted.  It also showed focal wall thickening of the sigmoid colon concerning for mass.  GI consulted.    Assessment    Klebsiella pneumoniae UTI  Klebsiella bacteremia  Distal left ureteral calculus  V[place] - Improved  Focal thickening sigmoid colon  Chronic HFpEF-compensated  Status post TAVR  History of CAD  History of Prinzmetal angina  Hypertension    Plan    Continue IV Rocephin  Urine culture noted.  Klebsiella pneumoniae UTI.  Susceptibilities pending.  Blood culture also shows Klebsiella bacteremia  Infectious disease consulted  Urology eval appreciated.  Status post cystoscopy/L ureteral stent insertion on 2024.   Continue IV fluids.  Monitor renal function.  Resume lisinopril today.  Creatinine appears to be at baseline.  Continue IV hydralazine as needed  Continue aspirin/Plavix/statin therapy  Focal thickening sigmoid colon noted.?  Mass.  GI consult informed.      Diet ADULT DIET; Regular   DVT Prophylaxis [x] Lovenox, []  Heparin, [] SCDs, [] Ambulation   GI Prophylaxis [] PPI,  [] H2 Blocker,  [] Carafate,  [] Diet/Tube Feeds

## 2024-01-27 NOTE — CONSULTS
MD Ulysses   tamsulosin (FLOMAX) 0.4 MG capsule Take 1 capsule by mouth daily    Ulysses Smith MD   ramipril (ALTACE) 2.5 MG capsule Take 1 capsule by mouth daily    Ulysses Smith MD   acetaminophen (TYLENOL) 325 MG tablet Take 2 tablets by mouth every 6 hours as needed for Pain    Ulysses Smith MD   aspirin 81 MG tablet Take 1 tablet by mouth daily    Ulysses Smith MD   metoprolol (TOPROL-XL) 25 MG XL tablet Take 1 tablet by mouth 2 times daily    Ulysses Smith MD   clopidogrel (PLAVIX) 75 MG tablet Take 1 tablet by mouth daily    Ulysses Smith MD   Multiple Vitamins-Minerals (THERAPEUTIC MULTIVITAMIN-MINERALS) tablet Take 1 tablet by mouth daily    Ulysses Smith MD   fluticasone (FLONASE) 50 MCG/ACT nasal spray 2 sprays by Nasal route daily. 3/5/15   Esme Sullivan MD      Scheduled Meds:   lisinopril  10 mg Oral Daily    cefTRIAXone (ROCEPHIN) IV  2,000 mg IntraVENous Q12H    aspirin  81 mg Oral Daily    atorvastatin  40 mg Oral Nightly    clopidogrel  75 mg Oral Daily    ferrous sulfate  325 mg Oral Every Other Day    finasteride  5 mg Oral Daily    fluticasone  2 spray Nasal Daily    cetirizine  5 mg Oral Daily    metoprolol succinate  25 mg Oral BID    tamsulosin  0.4 mg Oral Daily    sodium chloride flush  5-40 mL IntraVENous 2 times per day    enoxaparin  40 mg SubCUTAneous Daily     Continuous Infusions:   sodium chloride      sodium chloride 75 mL/hr at 24 1138     PRN Meds:sodium chloride flush, sodium chloride, ondansetron **OR** ondansetron, acetaminophen **OR** acetaminophen, polyethylene glycol  Family History   Problem Relation Age of Onset    Heart Disease Mother     Pacemaker Mother     Cancer Neg Hx     Diabetes Neg Hx     Stroke Neg Hx      Social History     Tobacco Use    Smoking status: Former     Current packs/day: 0.00     Types: Cigarettes     Quit date: 2015     Years since quittin.9    Smokeless tobacco: Never    Substance Use Topics    Alcohol use: Yes     Alcohol/week: 0.0 standard drinks of alcohol     Comment: rare occasions       Objective:    Physical Exam:  BP (!) 117/57   Pulse 74   Temp 98.2 °F (36.8 °C) (Oral)   Resp 16   Ht 1.829 m (6')   Wt 81.4 kg (179 lb 6.4 oz)   SpO2 92%   BMI 24.33 kg/m²    General:  comfortable  Heent: There is no scleral icterus.   Respiratory:  The patient's breathing is non-labored with normal chest wall excursion and normal muscle movement.    Abdomen:  The abdomen is nondistended, soft, and nontender.  Rectal:  deferred   Extremities:  No edema.  Neurological:  Gross memory appears intact.  Patient is alert and oriented.    Labs and Imaging reviewed.  See HPI  Recent Labs     01/25/24  1815 01/26/24  0542 01/26/24  0731 01/27/24  0526   HGB 10.7* PLEASE DISREGARD RESULTS.  SPECIMEN CONTAMINATED. 10.2* 10.7*   WBC 23.3* PLEASE DISREGARD RESULTS.  SPECIMEN CONTAMINATED. 22.2* 16.6*   PROT 7.2  --   --   --    LABALBU 3.3*  --   --   --    ALKPHOS 94  --   --   --    ALT 19  --   --   --    AST 22  --   --   --    BILITOT 0.4  --   --   --    BILIDIR <0.2  --   --   --    IBILI Can not be calculated  --   --   --         Signed By: PEDRO HAHN MD   January 27, 2024

## 2024-01-28 LAB
ACB COMPLEX DNA BLD POS QL NAA+NON-PROBE: NOT DETECTED
ANION GAP SERPL CALCULATED.3IONS-SCNC: 12 MMOL/L (ref 3–16)
B FRAGILIS DNA BLD POS QL NAA+NON-PROBE: NOT DETECTED
BASOPHILS # BLD: 0.04 K/UL (ref 0–0.2)
BASOPHILS NFR BLD: 0 %
BLACTX-M ISLT/SPM QL: NOT DETECTED
BLAIMP ISLT/SPM QL: NOT DETECTED
BLAKPC ISLT/SPM QL: NOT DETECTED
BLAOXA-48-LIKE ISLT/SPM QL: NOT DETECTED
BLAVIM ISLT/SPM QL: NOT DETECTED
BUN SERPL-MCNC: 27 MG/DL (ref 7–20)
C ALBICANS DNA BLD POS QL NAA+NON-PROBE: NOT DETECTED
C AURIS DNA BLD POS QL NAA+NON-PROBE: NOT DETECTED
C GATTII+NEOFOR DNA BLD POS QL NAA+N-PRB: NOT DETECTED
C GLABRATA DNA BLD POS QL NAA+NON-PROBE: NOT DETECTED
C KRUSEI DNA BLD POS QL NAA+NON-PROBE: NOT DETECTED
C PARAP DNA BLD POS QL NAA+NON-PROBE: NOT DETECTED
C TROPICLS DNA BLD POS QL NAA+NON-PROBE: NOT DETECTED
CALCIUM SERPL-MCNC: 8.7 MG/DL (ref 8.3–10.6)
CHLORIDE SERPL-SCNC: 112 MMOL/L (ref 99–110)
CO2 SERPL-SCNC: 20 MMOL/L (ref 21–32)
COLISTIN RES MCR-1 ISLT/SPM QL: NOT DETECTED
CREAT SERPL-MCNC: 1.3 MG/DL (ref 0.8–1.3)
CRP SERPL HS-MCNC: 155 MG/L (ref 0–5.1)
E CLOAC COMP DNA BLD POS NAA+NON-PROBE: NOT DETECTED
E COLI DNA BLD POS QL NAA+NON-PROBE: NOT DETECTED
E FAECALIS DNA BLD POS QL NAA+NON-PROBE: NOT DETECTED
E FAECIUM DNA BLD POS QL NAA+NON-PROBE: NOT DETECTED
ENTEROBACTERALES DNA BLD POS NAA+N-PRB: DETECTED
EOSINOPHIL # BLD: 0.59 K/UL (ref 0–0.6)
EOSINOPHILS RELATIVE PERCENT: 5 %
ERYTHROCYTE [DISTWIDTH] IN BLOOD BY AUTOMATED COUNT: 13.8 % (ref 12.4–15.4)
ERYTHROCYTE [SEDIMENTATION RATE] IN BLOOD BY WESTERGREN METHOD: 80 MM/HR (ref 0–20)
FOLATE SERPL-MCNC: NORMAL NG/ML (ref 4.8–24.2)
GFR SERPL CREATININE-BSD FRML MDRD: 56 ML/MIN/1.73M2
GLUCOSE SERPL-MCNC: 98 MG/DL (ref 70–99)
GP B STREP DNA BLD POS QL NAA+NON-PROBE: NOT DETECTED
HAEM INFLU DNA BLD POS QL NAA+NON-PROBE: NOT DETECTED
HCT VFR BLD AUTO: 30 % (ref 40.5–52.5)
HGB BLD-MCNC: 9.7 G/DL (ref 13.5–17.5)
IMM GRANULOCYTES # BLD AUTO: 0.07 K/UL (ref 0–0.5)
IMM GRANULOCYTES NFR BLD: 1 %
K OXYTOCA DNA BLD POS QL NAA+NON-PROBE: NOT DETECTED
KLEBSIELLA SP DNA BLD POS QL NAA+NON-PRB: DETECTED
KLEBSIELLA SP DNA BLD POS QL NAA+NON-PRB: NOT DETECTED
L MONOCYTOG DNA BLD POS QL NAA+NON-PROBE: NOT DETECTED
LYMPHOCYTES NFR BLD: 1.78 K/UL (ref 1–5.1)
LYMPHOCYTES RELATIVE PERCENT: 14 %
MCH RBC QN AUTO: 33.3 PG (ref 26–34)
MCHC RBC AUTO-ENTMCNC: 32.3 G/DL (ref 31–36)
MCV RBC AUTO: 103.1 FL (ref 80–100)
MICROORGANISM SPEC CULT: ABNORMAL
MICROORGANISM SPEC CULT: ABNORMAL
MICROORGANISM/AGENT SPEC: ABNORMAL
MICROORGANISM/AGENT SPEC: ABNORMAL
MONOCYTES NFR BLD: 1.15 K/UL (ref 0–1.3)
MONOCYTES NFR BLD: 9 %
N MEN DNA BLD POS QL NAA+NON-PROBE: NOT DETECTED
NEUTROPHILS NFR BLD: 71 %
NEUTS SEG NFR BLD: 8.72 K/UL (ref 1.7–7.7)
P AERUGINOSA DNA BLD POS NAA+NON-PROBE: NOT DETECTED
PLATELET # BLD AUTO: 583 K/UL (ref 135–450)
PMV BLD AUTO: 9.3 FL
POTASSIUM SERPL-SCNC: 4 MMOL/L (ref 3.5–5.1)
PROTEUS SP DNA BLD POS QL NAA+NON-PROBE: NOT DETECTED
RBC # BLD AUTO: 2.91 M/UL (ref 4.2–5.9)
RESISTANT GENE NDM BY PCR: NOT DETECTED
S AUREUS DNA BLD POS QL NAA+NON-PROBE: NOT DETECTED
S AUREUS+CONS DNA BLD POS NAA+NON-PROBE: NOT DETECTED
S EPIDERMIDIS DNA BLD POS QL NAA+NON-PRB: NOT DETECTED
S LUGDUNENSIS DNA BLD POS QL NAA+NON-PRB: NOT DETECTED
S MALTOPHILIA DNA BLD POS QL NAA+NON-PRB: NOT DETECTED
S MARCESCENS DNA BLD POS NAA+NON-PROBE: NOT DETECTED
S PNEUM DNA BLD POS QL NAA+NON-PROBE: NOT DETECTED
S PYO DNA BLD POS QL NAA+NON-PROBE: NOT DETECTED
SALMONELLA DNA BLD POS QL NAA+NON-PROBE: NOT DETECTED
SERVICE CMNT-IMP: ABNORMAL
SODIUM SERPL-SCNC: 144 MMOL/L (ref 136–145)
SPECIMEN DESCRIPTION: ABNORMAL
STREPTOCOCCUS DNA BLD POS NAA+NON-PROBE: NOT DETECTED
VIT B12 SERPL-MCNC: 823 PG/ML (ref 211–911)
WBC OTHER # BLD: 12.4 K/UL (ref 4–11)

## 2024-01-28 PROCEDURE — 97166 OT EVAL MOD COMPLEX 45 MIN: CPT

## 2024-01-28 PROCEDURE — 6360000002 HC RX W HCPCS: Performed by: INTERNAL MEDICINE

## 2024-01-28 PROCEDURE — 1200000000 HC SEMI PRIVATE

## 2024-01-28 PROCEDURE — 86140 C-REACTIVE PROTEIN: CPT

## 2024-01-28 PROCEDURE — 97535 SELF CARE MNGMENT TRAINING: CPT

## 2024-01-28 PROCEDURE — 36415 COLL VENOUS BLD VENIPUNCTURE: CPT

## 2024-01-28 PROCEDURE — 6360000002 HC RX W HCPCS: Performed by: UROLOGY

## 2024-01-28 PROCEDURE — 6370000000 HC RX 637 (ALT 250 FOR IP): Performed by: INTERNAL MEDICINE

## 2024-01-28 PROCEDURE — 2580000003 HC RX 258: Performed by: UROLOGY

## 2024-01-28 PROCEDURE — 85652 RBC SED RATE AUTOMATED: CPT

## 2024-01-28 PROCEDURE — 80048 BASIC METABOLIC PNL TOTAL CA: CPT

## 2024-01-28 PROCEDURE — 97530 THERAPEUTIC ACTIVITIES: CPT

## 2024-01-28 PROCEDURE — 97162 PT EVAL MOD COMPLEX 30 MIN: CPT

## 2024-01-28 PROCEDURE — 97116 GAIT TRAINING THERAPY: CPT

## 2024-01-28 PROCEDURE — 2580000003 HC RX 258: Performed by: INTERNAL MEDICINE

## 2024-01-28 PROCEDURE — 85025 COMPLETE CBC W/AUTO DIFF WBC: CPT

## 2024-01-28 PROCEDURE — 6370000000 HC RX 637 (ALT 250 FOR IP): Performed by: UROLOGY

## 2024-01-28 RX ORDER — HYDROCODONE BITARTRATE AND ACETAMINOPHEN 5; 325 MG/1; MG/1
1 TABLET ORAL EVERY 6 HOURS PRN
Status: DISCONTINUED | OUTPATIENT
Start: 2024-01-28 | End: 2024-01-30 | Stop reason: HOSPADM

## 2024-01-28 RX ORDER — MORPHINE SULFATE 2 MG/ML
2 INJECTION, SOLUTION INTRAMUSCULAR; INTRAVENOUS
Status: DISCONTINUED | OUTPATIENT
Start: 2024-01-28 | End: 2024-01-30 | Stop reason: HOSPADM

## 2024-01-28 RX ADMIN — METOPROLOL SUCCINATE 25 MG: 50 TABLET, EXTENDED RELEASE ORAL at 20:45

## 2024-01-28 RX ADMIN — ASPIRIN 81 MG 81 MG: 81 TABLET ORAL at 09:22

## 2024-01-28 RX ADMIN — SODIUM CHLORIDE, PRESERVATIVE FREE 10 ML: 5 INJECTION INTRAVENOUS at 20:45

## 2024-01-28 RX ADMIN — ENOXAPARIN SODIUM 40 MG: 100 INJECTION SUBCUTANEOUS at 09:23

## 2024-01-28 RX ADMIN — LISINOPRIL 10 MG: 10 TABLET ORAL at 15:40

## 2024-01-28 RX ADMIN — CEFTRIAXONE SODIUM 2000 MG: 2 INJECTION, POWDER, FOR SOLUTION INTRAMUSCULAR; INTRAVENOUS at 22:03

## 2024-01-28 RX ADMIN — CEFTRIAXONE SODIUM 2000 MG: 2 INJECTION, POWDER, FOR SOLUTION INTRAMUSCULAR; INTRAVENOUS at 09:53

## 2024-01-28 RX ADMIN — ATORVASTATIN CALCIUM 40 MG: 40 TABLET, FILM COATED ORAL at 20:45

## 2024-01-28 RX ADMIN — FINASTERIDE 5 MG: 5 TABLET, FILM COATED ORAL at 09:22

## 2024-01-28 RX ADMIN — FERROUS SULFATE TAB 325 MG (65 MG ELEMENTAL FE) 325 MG: 325 (65 FE) TAB at 09:22

## 2024-01-28 RX ADMIN — TAMSULOSIN HYDROCHLORIDE 0.4 MG: 0.4 CAPSULE ORAL at 09:21

## 2024-01-28 RX ADMIN — METOPROLOL SUCCINATE 25 MG: 50 TABLET, EXTENDED RELEASE ORAL at 09:23

## 2024-01-28 RX ADMIN — CETIRIZINE HYDROCHLORIDE 5 MG: 10 TABLET, FILM COATED ORAL at 09:21

## 2024-01-28 RX ADMIN — CLOPIDOGREL BISULFATE 75 MG: 75 TABLET ORAL at 09:22

## 2024-01-28 ASSESSMENT — PAIN SCALES - GENERAL: PAINLEVEL_OUTOF10: 0

## 2024-01-28 NOTE — PLAN OF CARE
Problem: Pain  Goal: Verbalizes/displays adequate comfort level or baseline comfort level  Outcome: Progressing     Problem: Discharge Planning  Goal: Discharge to home or other facility with appropriate resources  Outcome: Progressing  Flowsheets (Taken 1/27/2024 0832 by Maribell Estrada RN)  Discharge to home or other facility with appropriate resources: Identify barriers to discharge with patient and caregiver     Problem: Skin/Tissue Integrity  Goal: Absence of new skin breakdown  Description: 1.  Monitor for areas of redness and/or skin breakdown  2.  Assess vascular access sites hourly  3.  Every 4-6 hours minimum:  Change oxygen saturation probe site  4.  Every 4-6 hours:  If on nasal continuous positive airway pressure, respiratory therapy assess nares and determine need for appliance change or resting period.  Outcome: Progressing     Problem: Safety - Adult  Goal: Free from fall injury  Outcome: Progressing     Problem: ABCDS Injury Assessment  Goal: Absence of physical injury  Outcome: Progressing     Problem: Chronic Conditions and Co-morbidities  Goal: Patient's chronic conditions and co-morbidity symptoms are monitored and maintained or improved  Outcome: Progressing

## 2024-01-28 NOTE — PROGRESS NOTES
[facility] - Rehabilitation Department      Physical Therapy    [x] Initial Evaluation            [x] Daily Treatment Note         [] Discharge Summary      Patient: Rod Rodriguez   : 1935   MRN: [id number]   Date of Service:  2024  Admitting Diagnosis: Complicated urinary tract infection  Current Admission Summary: 88 y.o.  male with past medical history of BPH, nondistended anemia, chronic HFpEF, cardiomyopathy, status post ICD, status post TAVR, hypertension, history of CAD, history of Prinzmetal angina, was admitted on 2024 for evaluation of generalized fatigue since past couple of days.  Patient was recently treated with antibiotics for UTI, completed course 2023.  Patient again noted to have abnormal urinalysis history of UTI along with leukocytosis/fever.  Also noted to have [place]. , CT abdomen shows 8 mm distal left ureteral calculus without hydronephrosis urology consulted.  It also showed focal wall thickening of the sigmoid colon concerning for mass.  GI consulted.    Past Medical History:  has a past medical history of Aortic valve disease, Arthritis, BPH (benign prostatic hyperplasia), Coronary artery vasospasm (HCC), Diverticulosis, H/O squamous cell carcinoma of skin, Hematuria, HTN (hypertension), Hyperlipidemia, Mixed hyperlipidemia, Osteoarthritis of right knee, and PSA elevation.  Past Surgical History:  has a past surgical history that includes Prostate surgery (); Tonsillectomy and adenoidectomy (as a child); laparotomy (); Appendectomy (); hernia repair (); Cosmetic surgery (); Cardiac pacemaker placement (2016); Pain management procedure (Left, 2020); Nerve Block (Bilateral, 2021); Nerve Block (Bilateral, 2021); joint replacement (2015); Pain management procedure (Bilateral, 2021); and Cystoscopy (Left, 2024).  Discharge Recommendations: 24 hour assist and home PT  [facility] Raw Score: 18

## 2024-01-28 NOTE — PROGRESS NOTES
[facility] - Rehabilitation Department       Occupational Therapy    [x] Initial Evaluation            [x] Daily Treatment Note         [] Discharge Summary      Patient: Rod Rodriguez   : 1935   MRN: [id number]   Date of Service:  2024    Admitting Diagnosis:  Complicated urinary tract infection  Current Admission Summary: \"88 y.o.  male with past medical history of BPH, nondistended anemia, chronic HFpEF, cardiomyopathy, status post ICD, status post TAVR, hypertension, history of CAD, history of Prinzmetal angina, was admitted on 2024 for evaluation of generalized fatigue since past couple of days.  Patient was recently treated with antibiotics for UTI, completed course 2023.  Patient again noted to have abnormal urinalysis history of UTI along with leukocytosis/fever.  Also noted to have [place]. , CT abdomen shows 8 mm distal left ureteral calculus without hydronephrosis urology consulted.  It also showed focal wall thickening of the sigmoid colon concerning for mass.  GI consulted.   Past Medical History:  has a past medical history of Aortic valve disease, Arthritis, BPH (benign prostatic hyperplasia), Coronary artery vasospasm (HCC), Diverticulosis, H/O squamous cell carcinoma of skin, Hematuria, HTN (hypertension), Hyperlipidemia, Mixed hyperlipidemia, Osteoarthritis of right knee, and PSA elevation.  Past Surgical History:  has a past surgical history that includes Prostate surgery (); Tonsillectomy and adenoidectomy (as a child); laparotomy (); Appendectomy (); hernia repair (); Cosmetic surgery (); Cardiac pacemaker placement (2016); Pain management procedure (Left, 2020); Nerve Block (Bilateral, 2021); Nerve Block (Bilateral, 2021); joint replacement (2015); Pain management procedure (Bilateral, 2021); and Cystoscopy (Left, 2024).      Discharge Recommendations: Home with initial 24 sup/assist as needed,  patient left in chair, chair alarm in place, call light within reach, and nurse notified  ______________________________________________________________________________________________________________________________________________    Precautions/Restrictions:high fall risk and up as tolerated  Weight Bearing Restrictions: no restrictions  [] Right Upper Extremity  [] Left Upper Extremity [] Right Lower Extremity  [] Left Lower Extremity     Required Braces/Orthotics: no braces required   [] Right  [] Left  Positional Restrictions:no positional restrictions     Pre-Admission Information   Lives With: spouse    Type of Home: condo  Home Layout: one level  Home Access:ramped entry, (one step, but ramped)  Bathroom Layout: walker accessible, walk in shower  Bathroom Equipment: grab bars in shower, shower chair  Toilet Height: elevated height  Home Equipment: rolling walker, rollator - 4 wheeled walker, single point cane, reacher, sock aide  Transfer Assistance: modified independent with use of FWW  Ambulation Assistance:modified independent with use of FWW  ADL Assistance: independent with all ADL's, use of AE  IADL Assistance: independent with homemaking tasks  Active :        [] Yes  [x] No, family provides transportation   Comment:      Recent Falls: 1 recent fall     Examination     Vision:   Vision Gross Assessment: WFL with glasses wears glasses for reading   Hearing:   WFL    Observation:   Multiple abrasions/ wounds throughout BLE and BUE   Sensation:   WFL to light touch extremities x4    Additional Neurological Testing:    ROM:  LUE: limited at shoulder (90 degrees)   RUE: limited at shoulder (90 degrees)  Strength:  LUE: 4/5    RUE: 4/5      Therapist Clinical Decision Making (Complexity): medium complexity  Clinical Presentation: stable      Subjective    General: \"I feel semi- human again\" Patient in bed upon arrival, agreeable to occupational therapy eval/treat.     Pain: 0/10 \"no pain just

## 2024-01-28 NOTE — PROGRESS NOTES
Hospitalist Progress Note      Name:  Rod Rodriguez /Age/Sex: 1935  (88 y.o. male)   MRN & CSN:  [id number] & [id number] Admission Date/Time: 2024  5:41 PM   Location:  [facility]- PCP: Esme Sullivan MD         Hospital Day: 4    Assessment and Plan:   Rod Rodriguez is a 88 y.o.  male with past medical history of BPH, nondistended anemia, chronic HFpEF, cardiomyopathy, status post ICD, status post TAVR, hypertension, history of CAD, history of Prinzmetal angina, was admitted on 2024 for evaluation of generalized fatigue since past couple of days.  Patient was recently treated with antibiotics for UTI, completed course 2023.  Patient again noted to have abnormal urinalysis history of UTI along with leukocytosis/fever.  Also noted to have [place]. , CT abdomen shows 8 mm distal left ureteral calculus without hydronephrosis urology consulted.  It also showed focal wall thickening of the sigmoid colon concerning for mass.  GI consulted.    Assessment    Klebsiella pneumoniae UTI  Klebsiella bacteremia  Distal left ureteral calculus  VIR[place] - Improved  Focal thickening sigmoid colon  Chronic HFpEF-compensated  Status post TAVR  History of CAD  History of Prinzmetal angina  Hypertension    Plan    Continue IV Rocephin  Urine culture noted.  Klebsiella pneumoniae UTI.    Blood culture upon admission shows Klebsiella bacteremia  Infectious disease consulted.  Case discussed with infectious disease, repeat blood culture sent 2024.  Urology eval appreciated.  Status post cystoscopy/L ureteral stent insertion on 2024.  Recommend to follow-up in the office in about 2 weeks, urology signed off.  Continue IV fluids.  Monitor renal function.  Resume lisinopril.  Creatinine appears to be at baseline.  Continue IV hydralazine as needed  Continue aspirin/Plavix/statin therapy  Focal thickening sigmoid colon noted.?  Mass.  GI consult appreciated.  Recommend elective colonoscopy as

## 2024-01-29 LAB
ANION GAP SERPL CALCULATED.3IONS-SCNC: 12 MMOL/L (ref 3–16)
BASOPHILS # BLD: 0.05 K/UL (ref 0–0.2)
BASOPHILS NFR BLD: 0 %
BUN SERPL-MCNC: 23 MG/DL (ref 7–20)
CALCIUM SERPL-MCNC: 9.2 MG/DL (ref 8.3–10.6)
CHLORIDE SERPL-SCNC: 112 MMOL/L (ref 99–110)
CO2 SERPL-SCNC: 21 MMOL/L (ref 21–32)
CREAT SERPL-MCNC: 1.3 MG/DL (ref 0.8–1.3)
EOSINOPHIL # BLD: 0.69 K/UL (ref 0–0.6)
EOSINOPHILS RELATIVE PERCENT: 6 %
ERYTHROCYTE [DISTWIDTH] IN BLOOD BY AUTOMATED COUNT: 13.7 % (ref 12.4–15.4)
GFR SERPL CREATININE-BSD FRML MDRD: 54 ML/MIN/1.73M2
GLUCOSE SERPL-MCNC: 115 MG/DL (ref 70–99)
HCT VFR BLD AUTO: 34.5 % (ref 40.5–52.5)
HGB BLD-MCNC: 11.3 G/DL (ref 13.5–17.5)
IMM GRANULOCYTES # BLD AUTO: 0.04 K/UL (ref 0–0.5)
IMM GRANULOCYTES NFR BLD: 0 %
LYMPHOCYTES NFR BLD: 1.59 K/UL (ref 1–5.1)
LYMPHOCYTES RELATIVE PERCENT: 14 %
MCH RBC QN AUTO: 33.6 PG (ref 26–34)
MCHC RBC AUTO-ENTMCNC: 32.8 G/DL (ref 31–36)
MCV RBC AUTO: 102.7 FL (ref 80–100)
MONOCYTES NFR BLD: 1.14 K/UL (ref 0–1.3)
MONOCYTES NFR BLD: 10 %
NEUTROPHILS NFR BLD: 70 %
NEUTS SEG NFR BLD: 8.12 K/UL (ref 1.7–7.7)
PLATELET # BLD AUTO: 623 K/UL (ref 135–450)
PMV BLD AUTO: 9.2 FL (ref 9.4–12.4)
POTASSIUM SERPL-SCNC: 4.5 MMOL/L (ref 3.5–5.1)
RBC # BLD AUTO: 3.36 M/UL (ref 4.2–5.9)
SODIUM SERPL-SCNC: 146 MMOL/L (ref 136–145)
WBC OTHER # BLD: 11.6 K/UL (ref 4–11)

## 2024-01-29 PROCEDURE — 36415 COLL VENOUS BLD VENIPUNCTURE: CPT

## 2024-01-29 PROCEDURE — 6360000002 HC RX W HCPCS: Performed by: INTERNAL MEDICINE

## 2024-01-29 PROCEDURE — 97535 SELF CARE MNGMENT TRAINING: CPT

## 2024-01-29 PROCEDURE — 6360000002 HC RX W HCPCS: Performed by: UROLOGY

## 2024-01-29 PROCEDURE — 6370000000 HC RX 637 (ALT 250 FOR IP): Performed by: UROLOGY

## 2024-01-29 PROCEDURE — 80048 BASIC METABOLIC PNL TOTAL CA: CPT

## 2024-01-29 PROCEDURE — 1200000000 HC SEMI PRIVATE

## 2024-01-29 PROCEDURE — 99233 SBSQ HOSP IP/OBS HIGH 50: CPT | Performed by: INTERNAL MEDICINE

## 2024-01-29 PROCEDURE — 6370000000 HC RX 637 (ALT 250 FOR IP): Performed by: INTERNAL MEDICINE

## 2024-01-29 PROCEDURE — 85025 COMPLETE CBC W/AUTO DIFF WBC: CPT

## 2024-01-29 PROCEDURE — 97530 THERAPEUTIC ACTIVITIES: CPT

## 2024-01-29 PROCEDURE — 97110 THERAPEUTIC EXERCISES: CPT

## 2024-01-29 PROCEDURE — 2580000003 HC RX 258: Performed by: UROLOGY

## 2024-01-29 PROCEDURE — 2580000003 HC RX 258: Performed by: INTERNAL MEDICINE

## 2024-01-29 RX ORDER — CIPROFLOXACIN 500 MG/1
500 TABLET, FILM COATED ORAL 2 TIMES DAILY
Qty: 28 TABLET | Refills: 0 | Status: SHIPPED | OUTPATIENT
Start: 2024-01-29 | End: 2024-02-12

## 2024-01-29 RX ADMIN — METOPROLOL SUCCINATE 25 MG: 50 TABLET, EXTENDED RELEASE ORAL at 08:01

## 2024-01-29 RX ADMIN — ENOXAPARIN SODIUM 40 MG: 100 INJECTION SUBCUTANEOUS at 08:02

## 2024-01-29 RX ADMIN — SODIUM CHLORIDE, PRESERVATIVE FREE 10 ML: 5 INJECTION INTRAVENOUS at 20:24

## 2024-01-29 RX ADMIN — CEFTRIAXONE SODIUM 2000 MG: 2 INJECTION, POWDER, FOR SOLUTION INTRAMUSCULAR; INTRAVENOUS at 20:16

## 2024-01-29 RX ADMIN — FINASTERIDE 5 MG: 5 TABLET, FILM COATED ORAL at 08:01

## 2024-01-29 RX ADMIN — CEFTRIAXONE SODIUM 2000 MG: 2 INJECTION, POWDER, FOR SOLUTION INTRAMUSCULAR; INTRAVENOUS at 09:18

## 2024-01-29 RX ADMIN — FLUTICASONE PROPIONATE 2 SPRAY: 50 SPRAY, METERED NASAL at 08:02

## 2024-01-29 RX ADMIN — TAMSULOSIN HYDROCHLORIDE 0.4 MG: 0.4 CAPSULE ORAL at 08:01

## 2024-01-29 RX ADMIN — LISINOPRIL 10 MG: 10 TABLET ORAL at 08:01

## 2024-01-29 RX ADMIN — CLOPIDOGREL BISULFATE 75 MG: 75 TABLET ORAL at 08:01

## 2024-01-29 RX ADMIN — CETIRIZINE HYDROCHLORIDE 5 MG: 10 TABLET, FILM COATED ORAL at 08:01

## 2024-01-29 RX ADMIN — METOPROLOL SUCCINATE 25 MG: 50 TABLET, EXTENDED RELEASE ORAL at 20:09

## 2024-01-29 RX ADMIN — ASPIRIN 81 MG 81 MG: 81 TABLET ORAL at 08:01

## 2024-01-29 RX ADMIN — FERROUS SULFATE TAB 325 MG (65 MG ELEMENTAL FE) 325 MG: 325 (65 FE) TAB at 08:01

## 2024-01-29 RX ADMIN — ATORVASTATIN CALCIUM 40 MG: 40 TABLET, FILM COATED ORAL at 20:09

## 2024-01-29 RX ADMIN — SODIUM CHLORIDE, PRESERVATIVE FREE 10 ML: 5 INJECTION INTRAVENOUS at 08:02

## 2024-01-29 ASSESSMENT — PAIN SCALES - GENERAL: PAINLEVEL_OUTOF10: 0

## 2024-01-29 NOTE — PROGRESS NOTES
Hospitalist Progress Note      Name:  Rod Rodriguez /Age/Sex: 1935  (88 y.o. male)   MRN & CSN:  [id number] & [id number] Admission Date/Time: 2024  5:41 PM   Location:  [facility]3202- PCP: Esme Sullivan MD         Hospital Day: 5    Assessment and Plan:   Rod Rodriguez is a 88 y.o.  male with past medical history of BPH, nondistended anemia, chronic HFpEF, cardiomyopathy, status post ICD, status post TAVR, hypertension, history of CAD, history of Prinzmetal angina, was admitted on 2024 for evaluation of generalized fatigue since past couple of days.  Patient was recently treated with antibiotics for UTI, completed course 2023.  Patient again noted to have abnormal urinalysis history of UTI along with leukocytosis/fever.  Also noted to have VIRGINIA. , CT abdomen shows 8 mm distal left ureteral calculus without hydronephrosis urology consulted.  It also showed focal wall thickening of the sigmoid colon concerning for mass.  GI consulted.    Assessment    Klebsiella pneumoniae UTI  Klebsiella bacteremia  Distal left ureteral calculus  VIRGINIA - Improved  Focal thickening sigmoid colon  Chronic HFpEF-compensated  Status post TAVR  History of CAD  History of Prinzmetal angina  Hypertension    Plan    Continue IV Rocephin  Urine culture noted.  Klebsiella pneumoniae UTI.    Blood culture upon admission shows Klebsiella bacteremia  Infectious disease consulted.  Case discussed with infectious disease, repeat blood culture sent 2024, no growth till date  Urology eval appreciated.  Status post cystoscopy/L ureteral stent insertion on 2024.  Recommend to follow-up in the office in about 2 weeks, urology signed off.  Continue IV fluids.  Monitor renal function.  Resume lisinopril.  Creatinine appears to be at baseline.  Continue IV hydralazine as needed  Continue aspirin/Plavix/statin therapy  Focal thickening sigmoid colon noted.?  Mass.  GI consult appreciated.  Recommend

## 2024-01-29 NOTE — PROGRESS NOTES
[facility] - Rehabilitation Department      Physical Therapy    [] Initial Evaluation            [x] Daily Treatment Note         [] Discharge Summary      Patient: Rod Rodriguez   : 1935   MRN: [id number]   Date of Service:  2024  Admitting Diagnosis: Complicated urinary tract infection  Current Admission Summary: 88 y.o.  male with past medical history of BPH, nondistended anemia, chronic HFpEF, cardiomyopathy, status post ICD, status post TAVR, hypertension, history of CAD, history of Prinzmetal angina, was admitted on 2024 for evaluation of generalized fatigue since past couple of days.  Patient was recently treated with antibiotics for UTI, completed course 2023.  Patient again noted to have abnormal urinalysis history of UTI along with leukocytosis/fever.  Also noted to have VIRGINIA. , CT abdomen shows 8 mm distal left ureteral calculus without hydronephrosis urology consulted.  It also showed focal wall thickening of the sigmoid colon concerning for mass.  GI consulted.    Past Medical History:  has a past medical history of Aortic valve disease, Arthritis, BPH (benign prostatic hyperplasia), Coronary artery vasospasm (HCC), Diverticulosis, H/O squamous cell carcinoma of skin, Hematuria, HTN (hypertension), Hyperlipidemia, Mixed hyperlipidemia, Osteoarthritis of right knee, and PSA elevation.  Past Surgical History:  has a past surgical history that includes Prostate surgery (); Tonsillectomy and adenoidectomy (as a child); laparotomy (); Appendectomy (); hernia repair (); Cosmetic surgery (); Cardiac pacemaker placement (2016); Pain management procedure (Left, 2020); Nerve Block (Bilateral, 2021); Nerve Block (Bilateral, 2021); joint replacement (2015); Pain management procedure (Bilateral, 2021); and Cystoscopy (Left, 2024).  Discharge Recommendations: 24 hour assist and home PT  [facility] Raw Score: 18  AM-PAC  understanding, would benefit from continued reinforcement    Patient Disposition at end of session:  patient left in bed, bed alarm in place, call light within reach, patient at risk for falls, and nurse notified    Plan  Frequency: 2-5  Current Treatment Recommendations: strengthening, balance training, functional mobility training, transfer training, and gait training    Goals  Patient Goals: Get stronger       Goals :   To be met in by:Discharge unless noted otherwise:  1). Independent with LE Ex x 10 reps to maintain/improve AROM/strength met by This Week  2). Sit to/from stand:Supervision   3). Bed to chair: Supervision using wheeled walker  4). Gait: Ambulate 150 ft.Supervision and use of wheeled walker         Above goals reviewed on 1/29/2024.  All goals are ongoing at this time unless indicated above.      Therapy Session Time      Individual Group Co-treatment   Time In 1430       Time Out 1510       Minutes 40         Timed Code Treatment Minutes:   40  Total Treatment Minutes:  40     Electronically Signed By: Danielle Duong, PT

## 2024-01-29 NOTE — PROGRESS NOTES
for LB dressing. Pt doffs socks with heel/toe pulling at EOB. Pt given sock aid and told to use it. Pt requries visual demonstration for proper use to decrease difficulty of task.   Toileting: contact guard assistance  - comments: pt with knees flexed, concerns re: impaired balance.       FUNCTIONAL MOBILITY  Rolling: not attempted  - comments:   Supine > Sit: supervision  - comments: HOB minimally elevated   Sit> Supine: not attempted  - comments:   Sit <> Stand: stand by assistance  - comments: with FWW, verbal cues for hand placement    Bed> stand by assistance  [x]Chair  []BSC  - comments: FWW with cues   Toilet Transfer: contact guard assistance  - comments: d/t safety concerns   Tub/ Shower Transfer: not attempted  - comments:       Balance: Static Sitting Balance: mod IND    Dynamic Sitting Balance: mod IND       Static Standing Balance: SBA    Dynamic Standing Balance: SBA to CGA    Comments:    Other Therapeutic Interventions    Functional Outcomes  AM-PAC Inpatient Daily Activity Raw Score: 19  ________________________________________________________________________________________________________________________    Cognition  does not require cues  Orientation:    alert and oriented x 4  Command Following:   WFL     Education  Barriers To Learning: none  Patient Education: patient educated on goals, OT role and benefits, plan of care, ADL adaptive strategies, IADL safety, transfer training, discharge recommendations  Learning Assessment:  patient verbalizes and demonstrates understanding    Plan  Frequency: 2-5x/per week  Current Treatment Recommendations: strengthening, ROM, balance training, functional mobility training, transfer training, endurance training, ADL/self-care training, and IADL training    Goals  Patient Goals: \"Return home stronger\"    Short Term Goals:  Time Frame: 2/4/2024 unless noted otherwise  Patient will complete lower body ADL at AE as needed, supervision  Patient will complete

## 2024-01-29 NOTE — PLAN OF CARE
Problem: Pain  Goal: Verbalizes/displays adequate comfort level or baseline comfort level  Outcome: Progressing   Patient displays adequate comfort level at this time. Writer offered pain medication post PT patient declined.

## 2024-01-29 NOTE — PROGRESS NOTES
Infectious Diseases Inpatient  Progress Note    CHIEF COMPLAINT:     Sepsis  Complicated UTI  Klebsiella bacteremia  WBC elevation  Ureteral stone   Cystoscopy and ureteral stent in place  AICD in place  History of TAVR    HISTORY OF PRESENT ILLNESS:  88 y.o. man with a history of diabetes, COPD coronary artery disease BPH, history of TAVR, AICD in situ admitted to hospital secondary to fatigue fall not feeling well found to be in sepsis with elevated WBC count, at 23.3 with left shift hemoglobin 10.7 .6, was in acute kidney injury with creatinine elevation up to 1.6 lactic acid 1.3 procalcitonin 0.76 urinalysis very abnormal with 3+ bacteria large leuk esterase, urine culture positive for Klebsiella pneumonia, blood culture from admission now positive for Klebsiella pneumonia.  CT head negative CT chest abdomen pelvis indicated 8 mm distal left ureteral calculus but no hydronephrosis extensive sigmoid diverticulosis mild prostate gland emphysematous changes noted in both lungs.  He was evaluated by urology was taken for left ureteral stent placement and cystoscopy on 1/26/24 by Dr. Nair.  Tmax 99.4, given the need for IV antibiotic we are consulted for recommendations    He was recently admitted to [facility] from 12/17/23 to 12/29/23, for septic shock secondary to urinary tract infection blood culture was positive for Klebsiella as well as urine culture was positive for Klebsiella on that admission he underwent ultrasound of the kidney on that admission but no CT scan performed he was treated with a course of ceftriaxone was discharged home on Omnicef.  Notes and results were reviewed in detail under care everywhere      Interval history : Tolerating antibiotic therapy okay WBC slowly trending down no abdominal pain, was able to do some physical therapy today, follow-up blood cultures now coming back negative, creatinine trend down to 1.3 WBC 11.6       Past Medical History:    Past  obstruction.  Now status post a left ureteral stent placement hopefully this will help to resolve the infection, agree with IV antibiotic will up the dose of the antibiotic given the ongoing sepsis isolate remains sensitive will be able to choose quinolones at Good levels in the urine for discharge planning      He does have AICD in place with a history of TAVR will repeat blood cultures to make sure it is clearing there is also some risk for seeding the valve as well as AICD    Follow blood cultures have cleared WBC trending down clinically slowly improving will be able to choose oral ciprofloxacin for discharge planning.    Will arrange repeat blood cultures and urine analysis and urine culture in 2 weeks post completion of antibiotic therapy to make sure there is no relapse of infection      Labs, Microbiology, Radiology and pertinent results from current hospitalization and care every where were reviewed by me as a part of the consultation.    PLAN :  Cont IV Ceftriaxone x increase the dose x 2 gm Q 12 hr as an inpatient blood cx  Trend wbc now down to 11.2  Check Procal in AM  Recent Echo TAVR ok   Risk for seeding and has  AICD in place  Option for oral abx Ciprofloxacin x 100 mg twice a day for 14 days-prescription sent to his pharmacy  If blood cx delay in clearance may consider IV abx given the AICD and TAVR   ESR, CRP check in a.m.  Check urinalysis and urine culture, blood cultures as outpatient after completion of antibiotic therapy in 2 weeks  Lab order for blood culture and urine analysis and urine culture placed in the epic  If no new issues tomorrow okay for DC planning follow-up with urology discussed with the patient      Discussed with patient/Family and Nursing     Medical Decision Making:  The following items were considered in medical decision making:  Discussion of patient care with other providers  Reviewed clinical lab tests  Reviewed radiology tests  Reviewed other diagnostic

## 2024-01-29 NOTE — CARE COORDINATION
Met with pt at bedside and spoke with yulissa daughter Sue per phone and plan is still to return home with [facility]. They would [place] [facility] and [facility] for IVAB if ordered. Cm to continue to follow for pt d/c needs.

## 2024-01-30 VITALS
HEIGHT: 72 IN | WEIGHT: 177 LBS | SYSTOLIC BLOOD PRESSURE: 134 MMHG | TEMPERATURE: 97.7 F | HEART RATE: 69 BPM | OXYGEN SATURATION: 95 % | BODY MASS INDEX: 23.98 KG/M2 | DIASTOLIC BLOOD PRESSURE: 67 MMHG | RESPIRATION RATE: 16 BRPM

## 2024-01-30 LAB
CRP SERPL HS-MCNC: 59.6 MG/L (ref 0–5.1)
ERYTHROCYTE [SEDIMENTATION RATE] IN BLOOD BY WESTERGREN METHOD: 75 MM/HR (ref 0–20)
MICROORGANISM SPEC CULT: NORMAL
PROCALCITONIN SERPL-MCNC: 0.94 NG/ML (ref 0–0.15)
SERVICE CMNT-IMP: NORMAL
SPECIMEN DESCRIPTION: NORMAL

## 2024-01-30 PROCEDURE — 36415 COLL VENOUS BLD VENIPUNCTURE: CPT

## 2024-01-30 PROCEDURE — 6360000002 HC RX W HCPCS: Performed by: UROLOGY

## 2024-01-30 PROCEDURE — 85652 RBC SED RATE AUTOMATED: CPT

## 2024-01-30 PROCEDURE — 6370000000 HC RX 637 (ALT 250 FOR IP): Performed by: UROLOGY

## 2024-01-30 PROCEDURE — 2580000003 HC RX 258: Performed by: UROLOGY

## 2024-01-30 PROCEDURE — 97530 THERAPEUTIC ACTIVITIES: CPT

## 2024-01-30 PROCEDURE — 97116 GAIT TRAINING THERAPY: CPT

## 2024-01-30 PROCEDURE — 84145 PROCALCITONIN (PCT): CPT

## 2024-01-30 PROCEDURE — 6370000000 HC RX 637 (ALT 250 FOR IP): Performed by: INTERNAL MEDICINE

## 2024-01-30 PROCEDURE — 6360000002 HC RX W HCPCS: Performed by: INTERNAL MEDICINE

## 2024-01-30 PROCEDURE — 86140 C-REACTIVE PROTEIN: CPT

## 2024-01-30 PROCEDURE — 2580000003 HC RX 258: Performed by: INTERNAL MEDICINE

## 2024-01-30 RX ADMIN — CEFTRIAXONE SODIUM 2000 MG: 2 INJECTION, POWDER, FOR SOLUTION INTRAMUSCULAR; INTRAVENOUS at 08:40

## 2024-01-30 RX ADMIN — TAMSULOSIN HYDROCHLORIDE 0.4 MG: 0.4 CAPSULE ORAL at 08:25

## 2024-01-30 RX ADMIN — ASPIRIN 81 MG 81 MG: 81 TABLET ORAL at 08:26

## 2024-01-30 RX ADMIN — CETIRIZINE HYDROCHLORIDE 5 MG: 10 TABLET, FILM COATED ORAL at 08:25

## 2024-01-30 RX ADMIN — FINASTERIDE 5 MG: 5 TABLET, FILM COATED ORAL at 08:25

## 2024-01-30 RX ADMIN — FLUTICASONE PROPIONATE 2 SPRAY: 50 SPRAY, METERED NASAL at 08:26

## 2024-01-30 RX ADMIN — METOPROLOL SUCCINATE 25 MG: 50 TABLET, EXTENDED RELEASE ORAL at 08:26

## 2024-01-30 RX ADMIN — CLOPIDOGREL BISULFATE 75 MG: 75 TABLET ORAL at 08:25

## 2024-01-30 RX ADMIN — ENOXAPARIN SODIUM 40 MG: 100 INJECTION SUBCUTANEOUS at 08:31

## 2024-01-30 RX ADMIN — SODIUM CHLORIDE: 9 INJECTION, SOLUTION INTRAVENOUS at 08:39

## 2024-01-30 RX ADMIN — SODIUM CHLORIDE, PRESERVATIVE FREE 10 ML: 5 INJECTION INTRAVENOUS at 08:31

## 2024-01-30 RX ADMIN — LISINOPRIL 10 MG: 10 TABLET ORAL at 08:26

## 2024-01-30 NOTE — DISCHARGE SUMMARY
Discharge Summary           Name:  Rod Rodriguez /Age/Sex: 1935  (88 y.o. male)   MRN & CSN:  [id number] & [id number] Admission Date/Time: 2024  5:41 PM   Attending:  Tan Mo MD Discharging Physician: Tan Mo MD     Hospital Course:   Rod Rodriguez is a 88 y.o.  male with past medical history of BPH, nondistended anemia, chronic HFpEF, cardiomyopathy, status post ICD, status post TAVR, hypertension, history of CAD, history of Prinzmetal angina, was admitted on 2024 for evaluation of generalized fatigue since past couple of days.  Patient was recently treated with antibiotics for UTI, completed course 2023.  Patient again noted to have abnormal urinalysis history of UTI along with leukocytosis/fever.  Also noted to have V[place]. , CT abdomen shows 8 mm distal left ureteral calculus without hydronephrosis urology consulted.  It also showed focal wall thickening of the sigmoid colon concerning for mass.  GI consulted.   Urine culture/blood culture showed Klebsiella pneumonia.  ID was consulted.  Patient was initially treated with IV antibiotic, switch to p.o. ciprofloxacin x 14 days.  ID recommends to repeat labs at the end of 2 weeks and follow-up as an outpatient.  Patient was seen by urology, Status post cystoscopy/L ureteral stent insertion on 2024.  Recommend treatment of infection and follow-up in the office in about 2 weeks.  Case was discussed with ID today, okay to be discharged.  Patient was also seen by GI for sigmoid colon wall thickening, it appears to be in the same region as patient's previous colonic surgery however, recommend outpatient colonoscopy.  Patient is currently being discharged in stable condition to home with home health on 2024.  The patient expressed appropriate understanding of and agreement with the discharge recommendations, medications, and plan.     Discharge diagnosis    Klebsiella pneumoniae UTI  Klebsiella bacteremia  Distal left

## 2024-01-30 NOTE — PROGRESS NOTES
[facility] - Rehabilitation Department      Physical Therapy    [] Initial Evaluation            [x] Daily Treatment Note         [] Discharge Summary      Patient: Rod Rodriguez   : 1935   MRN: [id number]   Date of Service:  2024  Admitting Diagnosis: Complicated urinary tract infection  Current Admission Summary: 88 y.o.  male with past medical history of BPH, nondistended anemia, chronic HFpEF, cardiomyopathy, status post ICD, status post TAVR, hypertension, history of CAD, history of Prinzmetal angina, was admitted on 2024 for evaluation of generalized fatigue since past couple of days.  Patient was recently treated with antibiotics for UTI, completed course 2023.  Patient again noted to have abnormal urinalysis history of UTI along with leukocytosis/fever.  Also noted to have V[place]. , CT abdomen shows 8 mm distal left ureteral calculus without hydronephrosis urology consulted.  It also showed focal wall thickening of the sigmoid colon concerning for mass.  GI consulted.    Past Medical History:  has a past medical history of Aortic valve disease, Arthritis, BPH (benign prostatic hyperplasia), Coronary artery vasospasm (HCC), Diverticulosis, H/O squamous cell carcinoma of skin, Hematuria, HTN (hypertension), Hyperlipidemia, Mixed hyperlipidemia, Osteoarthritis of right knee, and PSA elevation.  Past Surgical History:  has a past surgical history that includes Prostate surgery (); Tonsillectomy and adenoidectomy (as a child); laparotomy (); Appendectomy (); hernia repair (); Cosmetic surgery (); Cardiac pacemaker placement (2016); Pain management procedure (Left, 2020); Nerve Block (Bilateral, 2021); Nerve Block (Bilateral, 2021); joint replacement (2015); Pain management procedure (Bilateral, 2021); Cystoscopy (Left, 2024); and Cystoscopy (Left, 2024).  Discharge Recommendations: 24 hour assist and home

## 2024-01-30 NOTE — CARE COORDINATION
DISCHARGE ORDER  Date/Time 2024 11:27 AM  Completed by: VESNA Hall, Case Management    Patient Name: Rod Rodriguez      : 1935  Admitting Diagnosis: Complicated urinary tract infection [N39.0]  VIR[place]IA (acute kidney injury) (HCC) [N17.9]  Acute cystitis without hematuria [N30.00]  Calculus of distal ureter [N20.1]      Admit order Date and Status:2024  (verify MD's last order for status of admission)      Noted discharge order.   If applicable PT/OT recommendation at Discharge: Yes [facility] w/PT/OT  DME recommendation by PT/OT:No  Confirmed discharge plan  ([phone]): Yes  with whom_Sue (child)______________  If pt confirmed DC plan does family need to be contacted by CM Yes if yes who_Sue_____  Discharge Plan: Pt d/c home in care of family. [facility] set up with [facility] for PT/OT. Spoke with L[place]. Spoke with Sue (child) and she will p/u pt @ 1300 for transport home. No other needs at this time.    Date of Last IMM Given: 2024    Reviewed chart.  Role of discharge planner explained and patient verbalized understanding. Discharge order is noted.    Has Home O2 in place on admit:  No  Informed of need to bring portable home O2 tank on day of discharge for nursing to connect prior to leaving:   Not Indicated  Verbalized agreement/Understanding:   Yes  Pt is being d/c'd to Home today. Pt's O2 sats are 95% on RA.    Discharge timeout done with CM, PT, RN. All discharge needs and concerns addressed.

## 2024-01-30 NOTE — DISCHARGE INSTR - COC
Continuity of Care Form    Patient Name: Rod Rodriguez   :  1935  MRN:  [id number]    Admit date:  2024  Discharge date:  2024      Code Status Order: Full Code   Advance Directives:   Advance Care Flowsheet Documentation       Date/Time Healthcare Directive Type of Healthcare Directive Copy in Chart Healthcare Agent Appointed Healthcare Agent's Name Healthcare Agent's Phone Number    [phone] Yes, patient has an advance directive for healthcare treatment -- -- -- -- --            Admitting Physician:  Aubrey Garcia MD  PCP: Esme Sullivan MD    Discharging Nurse: Ritika kenny  Discharging Hospital Unit/Room#: 3202/3202-01  Discharging Unit Phone Number: [phone]    Emergency Contact:   Extended Emergency Contact Information  Primary Emergency Contact: Taya Rodriguez  Address: [address] [place] [address]  Home Phone: [phone]  Mobile Phone: [phone]  Relation: Spouse  Secondary Emergency Contact: Kari Olmedo   [employer]  Home Phone: [phone]  Mobile Phone: [phone]  Relation: Child    Past Surgical History:  Past Surgical History:   Procedure Laterality Date    APPENDECTOMY  1970    CARDIAC PACEMAKER PLACEMENT  2016    [facility] CAMPBELL    COSMETIC SURGERY  2010    skin cancer - base of nose    CYSTOSCOPY Left 2024    CYSTOSCOPY URETERAL STENT INSERTION    HERNIA REPAIR  2011    JOINT REPLACEMENT  2015    Nathan CAMPBELL bilat knees    LAPAROTOMY  1970    exploratoryt for perf diverticulum    NERVE BLOCK Bilateral 2021    BILATERAL L3,L4,L5 DORSAL RAMUS MEDIAL BRANCH BLOCKS WITH FLUOROSCOPY performed by Gloria Lance MD at [facility]    NERVE BLOCK Bilateral 2021    BILATERAL L3,L4,L5 DORSAL RAMUS MEDIAL BRANCH BLOCKS WITH FLUROSCOPY performed by Gloria Lance MD at [facility]    PAIN MANAGEMENT PROCEDURE Left 2020    LEFT L3 AND L4  TRANSFORAMINAL EPIDURAL STEROID INJECTION WITH  FLUOROSCOPY performed by Gloria Lance MD at [facility]    PAIN MANAGEMENT PROCEDURE Bilateral 01/25/2021    BILATERAL L3,L4,L5 DORSAL RAMUS RADIOFREQUENCY ABLATION performed by Gloria Lance MD at [facility]    PROSTATE SURGERY  2011    biopsy - Kai    TONSILLECTOMY AND ADENOIDECTOMY  as a child       Immunization History:   Immunization History   Administered Date(s) Administered    COVID-19, J&J, (age 18y+), IM, 0.5 mL 03/09/2021, 10/30/2021    COVID-19, MODERNA, (2023-24 formula), (age 12y+), IM, 50mcg/0.5mL 11/02/2023    COVID-19, PFIZER Bivalent, DO NOT Dilute, (age 12y+), IM, 30 mcg/0.3 mL 09/10/2022    COVID-19, PFIZER PURPLE top, DILUTE for use, (age 12 y+), 30mcg/0.3mL 04/02/2022    Influenza Virus Vaccine 10/15/2014    Influenza, FLUAD, (age 65 y+), Adjuvanted, 0.5mL 09/12/2020, 09/20/2021    Influenza, High Dose (Fluzone 65 yrs and older) 10/15/2012, 10/12/2015, 10/10/2016, 10/20/2017    Influenza, Triv, inactivated, subunit, adjuvanted, IM (Fluad 65 yrs and older) 10/01/2018, 09/28/2019    Pneumococcal Conjugate 7-valent (Prevnar7) 08/07/2014    Pneumococcal Conjugate Vaccine 06/22/2008    Pneumococcal, PCV-13, PREVNAR 13, (age 6w+), IM, 0.5mL 07/05/2016    Pneumococcal, PPSV23, PNEUMOVAX 23, (age 2y+), SC/IM, 0.5mL 06/22/2008, 06/29/2015    TDaP, ADACEL (age 10y-64y), BOOSTRIX (age 10y+), IM, 0.5mL 07/02/2015    Zoster Live (Zostavax) 06/11/2015    Zoster Recombinant (Shingrix) 12/28/2018, 03/02/2019       Active Problems:  Patient Active Problem List   Diagnosis Code    Left hand pain M79.642    Asymptomatic bilateral carotid artery stenosis I65.23    Normocytic normochromic anemia D64.9    Knee pain, bilateral M25.561, M25.562    Right ankle swelling M25.471    Foot swelling M79.89    Primary osteoarthritis of left knee M17.12    Essential hypertension I10    Mixed hyperlipidemia E78.2    Prinzmetal angina (HCC) I20.1    History of artificial joint Z96.60    History of anticoagulant therapy

## 2024-01-30 NOTE — PROGRESS NOTES
Pt still denies pain. Fresh ice and starry drink provided. Denies needs awake watching television. No complaints voiced.

## 2024-01-30 NOTE — PLAN OF CARE
Problem: Pain  Goal: Verbalizes/displays adequate comfort level or baseline comfort level  1/30/2024 0103 by Nikki Merino, RN  Outcome: Progressing  1/29/2024 1530 by Kenzie Tobar RN  Outcome: Progressing     Problem: Discharge Planning  Goal: Discharge to home or other facility with appropriate resources  Outcome: Progressing     Problem: Skin/Tissue Integrity  Goal: Absence of new skin breakdown  Description: 1.  Monitor for areas of redness and/or skin breakdown  2.  Assess vascular access sites hourly  3.  Every 4-6 hours minimum:  Change oxygen saturation probe site  4.  Every 4-6 hours:  If on nasal continuous positive airway pressure, respiratory therapy assess nares and determine need for appliance change or resting period.  Outcome: Progressing     Problem: Safety - Adult  Goal: Free from fall injury  Outcome: Progressing  Flowsheets (Taken 1/30/2024 0059)  Free From Fall Injury: Instruct family/caregiver on patient safety     Problem: ABCDS Injury Assessment  Goal: Absence of physical injury  Outcome: Progressing     Problem: Chronic Conditions and Co-morbidities  Goal: Patient's chronic conditions and co-morbidity symptoms are monitored and maintained or improved  Outcome: Progressing

## 2024-01-30 NOTE — PLAN OF CARE
Problem: Pain  Goal: Verbalizes/displays adequate comfort level or baseline comfort level  1/30/2024 0902 by Ritika Mahoney RN  Outcome: Completed    Problem: Discharge Planning  Goal: Discharge to home or other facility with appropriate resources  1/30/2024 0902 by Ritika Mahoney RN  Outcome: Completed       Problem: Skin/Tissue Integrity  Goal: Absence of new skin breakdown  Description: 1.  Monitor for areas of redness and/or skin breakdown  2.  Assess vascular access sites hourly  3.  Every 4-6 hours minimum:  Change oxygen saturation probe site  4.  Every 4-6 hours:  If on nasal continuous positive airway pressure, respiratory therapy assess nares and determine need for appliance change or resting period.  1/30/2024 0902 by Ritika Mahoney RN  Outcome: Completed       Problem: Safety - Adult  Goal: Free from fall injury  1/30/2024 0902 by Ritika Mahoney RN  Outcome: Completed  Flowsheets (Taken 1/30/2024 0108 by Nikki Merino, RN)  Free From Fall Injury: Instruct family/caregiver on patient safety       Problem: ABCDS Injury Assessment  Goal: Absence of physical injury  1/30/2024 0902 by Ritika Mahoney RN  Outcome: Completed    Problem: Chronic Conditions and Co-morbidities  Goal: Patient's chronic conditions and co-morbidity symptoms are monitored and maintained or improved  1/30/2024 0902 by Ritika Mahoney RN  Outcome: Completed

## 2024-01-30 NOTE — PROGRESS NOTES
Pt discharged with his all belongings. Pt's daughter pick him up. Pt verbalize understanding next doctor visit and all his discharge instruction.....

## 2024-01-30 NOTE — PROGRESS NOTES
Assessment complete. VSS. Patient resting in bed. Respirations even and easy. Call light in reach. Fall precautions in place. No needs expressed at this time. Provided with ice and starry drink, pt denies pain.

## 2024-01-30 NOTE — OP NOTE
[facility]                  [address] [place], [place] [address]                                OPERATIVE REPORT    PATIENT NAME: ORLIN ESPARZA                     :        1935  MED REC NO:   [id number]                          ROOM:  ACCOUNT NO:   [id number]                           ADMIT DATE: 2024  PROVIDER:     Galo Boogie MD    DATE OF PROCEDURE:  2024    PREOPERATIVE DIAGNOSES:  Left ureteral stone and UTI.    POSTOPERATIVE DIAGNOSES:  Left ureteral stone and UTI.    OPERATIONS PERFORMED:  Cystoscopy, left double-J ureteral stent  placement.    SURGEON:  Galo Boogie MD    INDICATIONS:  The patient is an 88-year-old male who has an 8-mm left  distal ureteral stone and a UTI.  The risks and benefits of left stent  placement were discussed with the patient, and he agreed to proceed.    OPERATIVE PROCEDURE:  The patient had preoperative antibiotics and  general anesthesia and was in lithotomy position.  Genitalia were  prepped and draped in the usual sterile fashion.  A 21-Maldivian rigid  cystoscope was inserted through the patient's urethra.  The patient has  mildly enlarged prostate.  I looked in the bladder.  I found the left  ureteric orifice.  I then had to place the Pollack catheter and a  ZIPwire and navigated the wire into the ureteric orifice and beyond the  stone.  It went under fluoroscopic guidance up into the left kidney.  I  then removed the Pollack catheter and placed a 6-Maldivian 26-cm double-J  ureteral stent.  After it was up in the kidney, I removed the string and  the wire, the stent coiled up in the kidney and down the bladder.  The  bladder was emptied, and he was awoken and sent to recovery room in good  condition.  He tolerated the procedure well.  Estimated blood loss is 0  mL.    PLAN:  He needs to be set up for left ureteroscopy in 2 weeks by either  Dr. Boogie or Dr. Andrews.        GALO BOOGIE MD    D: 2024

## 2024-01-31 ENCOUNTER — CARE COORDINATION (OUTPATIENT)
Dept: CASE MANAGEMENT | Age: 89
End: 2024-01-31

## 2024-01-31 DIAGNOSIS — Z95.810 AICD (AUTOMATIC CARDIOVERTER/DEFIBRILLATOR) PRESENT: Primary | ICD-10-CM

## 2024-01-31 PROCEDURE — 1111F DSCHRG MED/CURRENT MED MERGE: CPT | Performed by: INTERNAL MEDICINE

## 2024-01-31 NOTE — CARE COORDINATION
Care Transitions Initial Follow Up Call    Call within 2 business days of discharge: Yes    Patient Current Location: Home: [facility] [address] [place] [address]    Care Transition Nurse contacted the patient by telephone to perform post hospital discharge assessment. Verified name and  with patient as identifiers. Provided introduction to self, and explanation of the Care Transition Nurse role.    Patient: Rod Rodriguez Patient : 1935   MRN: [id number]  Reason for Admission: [facility] 5 days -> Klebsiella pneumoniae UTI, distal left ureteral calculus s/p cysto w/ stent  (Dr Nair), VIR[place], focal thickening sigmoid colon, chronic HFpEF-compensated, s/p TAVR 2022, hx CAD, hx Prinzmetal angina, HTN, AICD in place -> home w/ [facility], AM-PAC 20, blood cx and UACS w/ cx in 2 weeks after completing abx therapy per ID (orders in Epic), f/up urology 2 weeks, f/up GI NP Marsha 4 weeks  Discharge Date: 24 RARS: Readmission Risk Score: 14.1    Last Discharge Facility       Date Complaint Diagnosis Description Type Department Provider    24 Fatigue; Fall Acute cystitis without hematuria ... ED to Hosp-Admission (Discharged) (ADMITTED) [facility] 3 Tan Fontenot MD; Ryan Dang,...     Challenges to be reviewed by the provider   Additional needs identified to be addressed with provider: No           Method of communication with provider: none.    Patient taking Cipro as instructed and tolerating. Aware of blood and urine needed when abx complete-orders in Epic. Does not monitor VS on a regular basis but does have equipment at home. Also states he has pacemaker. Will have home care PT OT evals today. Declines RPM. Overall feeling better than he was but not yet baseline. Reviewed plans for follow up. Patient doesn't drive. His daughters provide the transportation and do all of the appointment scheduling. They are aware of follow up plan and CTN notes that the providers planned to schedule

## 2024-02-02 LAB
MICROORGANISM SPEC CULT: NORMAL
MICROORGANISM SPEC CULT: NORMAL
SERVICE CMNT-IMP: NORMAL
SERVICE CMNT-IMP: NORMAL
SPECIMEN DESCRIPTION: NORMAL
SPECIMEN DESCRIPTION: NORMAL

## 2024-02-05 ENCOUNTER — TELEPHONE (OUTPATIENT)
Dept: PRIMARY CARE CLINIC | Age: 89
End: 2024-02-05

## 2024-02-06 ENCOUNTER — OFFICE VISIT (OUTPATIENT)
Dept: PRIMARY CARE CLINIC | Age: 89
End: 2024-02-06
Payer: MEDICARE

## 2024-02-06 VITALS
OXYGEN SATURATION: 98 % | SYSTOLIC BLOOD PRESSURE: 120 MMHG | BODY MASS INDEX: 22.11 KG/M2 | HEART RATE: 86 BPM | WEIGHT: 163 LBS | DIASTOLIC BLOOD PRESSURE: 62 MMHG

## 2024-02-06 DIAGNOSIS — N20.1 CALCULUS OF DISTAL URETER: ICD-10-CM

## 2024-02-06 DIAGNOSIS — I10 ESSENTIAL HYPERTENSION: ICD-10-CM

## 2024-02-06 DIAGNOSIS — N17.9 AKI (ACUTE KIDNEY INJURY) (HCC): ICD-10-CM

## 2024-02-06 DIAGNOSIS — R07.81 RIB PAIN ON LEFT SIDE: ICD-10-CM

## 2024-02-06 DIAGNOSIS — F32.5 MAJOR DEPRESSION, SINGLE EPISODE, IN COMPLETE REMISSION (HCC): Primary | ICD-10-CM

## 2024-02-06 DIAGNOSIS — N39.0 COMPLICATED URINARY TRACT INFECTION: ICD-10-CM

## 2024-02-06 LAB
ANION GAP SERPL CALCULATED.3IONS-SCNC: 16 MMOL/L (ref 3–16)
BUN SERPL-MCNC: 36 MG/DL (ref 7–20)
CALCIUM SERPL-MCNC: 9.9 MG/DL (ref 8.3–10.6)
CHLORIDE SERPL-SCNC: 103 MMOL/L (ref 99–110)
CO2 SERPL-SCNC: 22 MMOL/L (ref 21–32)
CREAT SERPL-MCNC: 1.6 MG/DL (ref 0.8–1.3)
GFR SERPLBLD CREATININE-BSD FMLA CKD-EPI: 42 ML/MIN/{1.73_M2}
GLUCOSE SERPL-MCNC: 86 MG/DL (ref 70–99)
POTASSIUM SERPL-SCNC: 5.3 MMOL/L (ref 3.5–5.1)
SODIUM SERPL-SCNC: 141 MMOL/L (ref 136–145)

## 2024-02-06 PROCEDURE — 1123F ACP DISCUSS/DSCN MKR DOCD: CPT | Performed by: INTERNAL MEDICINE

## 2024-02-06 PROCEDURE — 99214 OFFICE O/P EST MOD 30 MIN: CPT | Performed by: INTERNAL MEDICINE

## 2024-02-06 RX ORDER — SERTRALINE HYDROCHLORIDE 100 MG/1
100 TABLET, FILM COATED ORAL DAILY
Qty: 30 TABLET | Refills: 0 | Status: SHIPPED | OUTPATIENT
Start: 2024-02-06

## 2024-02-06 NOTE — PROGRESS NOTES
Date of Visit: 2024    Rod Rodriguez (:  1935) is a 88 y.o. male,  Established patient here for evaluation of the following chief complaint(s):  Depression, Follow-Up from Hospital, and Counselor      ASSESSMENT/PLAN:    1. Major depression, single episode, in complete remission (HCC)  -Not controlled  -Increase sertraline (ZOLOFT) 100 MG tablet; Take 1 tablet by mouth daily  Dispense: 30 tablet; Refill: 0  -Referral to [facility] - [facility] Mcmillan, [facility], Counseling, [place]    2. Essential hypertension  -Episode of low BP yesterday and blood pressure is normal today  -Continue ramipril 2.5 mg once daily  -Continue metoprolol ER 25 mg 2 times daily  -Monitor blood pressure and if low again will need to adjust medication  -Low-sodium diet  -Basic Metabolic Panel; Future    3. Complicated urinary tract infection  -Continue ciprofloxacin 500 mg 2 times daily as prescribed  -Increase water intake  -Follow-up with Urology    4. [place] (acute kidney injury) ([facility])  -Increase hydration  -Avoid NSAIDs  -Basic Metabolic Panel; Future    5. Calculus of distal ureter  -Patient has stent placed by Urology  -Follow-up with urology    6. Rib pain on left side  -CT chest showed no rib fracture  -Tylenol 650mg 3 times daily as needed          Return in about 4 weeks (around 3/5/2024) for depression.    SUBJECTIVE:    Patient has depression. Patient takes Sertraline 100mg once daily. Patient states he is getting a little better. Patient states he is playing a couple of games now. Patient states he still hasn't read a book. Patient states he finally turned in his work keys to [employer] on Saturday. Patient states he has been sleeping in and not wanting to get out of bed. Motivation is decreased. Patient states part of it is he can't physically do the things he wants to do. Patient is trying to get outside a little. Patient states his favorite phrase is he doesn't give a \"___\". Patient states he doesn't care too much

## 2024-02-07 ENCOUNTER — PATIENT MESSAGE (OUTPATIENT)
Dept: PRIMARY CARE CLINIC | Age: 89
End: 2024-02-07

## 2024-02-07 ENCOUNTER — CARE COORDINATION (OUTPATIENT)
Dept: CASE MANAGEMENT | Age: 89
End: 2024-02-07

## 2024-02-07 NOTE — CARE COORDINATION
Care Transitions Follow Up Call      Patient: Rod Rodriguez  Patient : 1935   MRN: [id number]  Reason for Admission: [facility] 5 days -> Klebsiella pneumoniae UTI, distal left ureteral calculus s/p cysto w/ stent  (Dr Nair), [place], focal thickening sigmoid colon, chronic HFpEF-compensated, s/p TAVR 2022, hx CAD, hx Prinzmetal angina, HTN, AICD in place -> home w/ [facility], AM-PAC 20, blood cx and UACS w/ cx in 2 weeks after completing abx therapy per ID (orders in Epic), f/up urology 2 weeks, f/up GI NP Marsha 4 weeks  Discharge Date: 24 RARS: Readmission Risk Score: 14.1    CTN attempted outreach but call would not connect. CTN will follow up at another time.     Follow Up  Future Appointments   Date Time Provider Department Center   2024 11:00 AM Winsome Pal MD  [facility]   3/5/2024  8:30 AM Esme Sullivan MD [facility] JULIA Warner, RN  Care Transition Nurse  [phone] mobile

## 2024-02-07 NOTE — TELEPHONE ENCOUNTER
From: Rod Rodriguez  To: Dr. Esme Sullivan  Sent: 2/7/2024 12:51 PM EST  Subject: Counseling Referral - Does not accept insurance    Hi Dr. Sullivan,  I called [facility] to schedule my dad and they do not have any providers that are currently accepting [employer] Medicare patients. Are you able to provide me with a couple of different practices to check within a reasonable difference from your office.  Thank you,  Patricia

## 2024-02-09 ENCOUNTER — CARE COORDINATION (OUTPATIENT)
Dept: CASE MANAGEMENT | Age: 89
End: 2024-02-09

## 2024-02-09 NOTE — CARE COORDINATION
Care Transitions Follow Up Call      Patient: Rod Rodriguez  Patient : 1935   MRN: [id number]  Reason for Admission: [facility] 5 days -> Klebsiella pneumoniae UTI, distal left ureteral calculus s/p cysto w/ stent  (Dr Nair), [place], focal thickening sigmoid colon, chronic HFpEF-compensated, s/p TAVR 2022, hx CAD, hx Prinzmetal angina, HTN, AICD in place -> home w/ [facility], AM-PAC 20, blood cx and UACS w/ cx in 2 weeks after completing abx therapy per ID (orders in Epic), f/up urology 2 weeks, f/up GI NP Marsha 4 weeks  Discharge Date: 24 RARS: Readmission Risk Score: 14.1    CTN attempted follow-up outreach to patient. Message left including CTN contact information. No further CTN outreach scheduled. Care transition program closed at this time.     Follow Up  Future Appointments   Date Time Provider Department Center   2024 11:00 AM Winsome Pal MD  [facility]   3/5/2024  8:30 AM Esme Sullivan MD [facility] [place] - DYD Karen Johnson, RN  Care Transition Nurse  [phone] mobile

## 2024-02-12 DIAGNOSIS — N39.0 COMPLICATED URINARY TRACT INFECTION: ICD-10-CM

## 2024-02-12 DIAGNOSIS — R78.81 BACTEREMIA DUE TO KLEBSIELLA PNEUMONIAE: ICD-10-CM

## 2024-02-12 DIAGNOSIS — B96.1 BACTEREMIA DUE TO KLEBSIELLA PNEUMONIAE: ICD-10-CM

## 2024-02-12 LAB
BACTERIA URNS QL MICRO: ABNORMAL /HPF
BILIRUB UR QL STRIP.AUTO: NEGATIVE
CLARITY UR: CLEAR
COLOR UR: YELLOW
EPI CELLS #/AREA URNS AUTO: 1 /HPF (ref 0–5)
GLUCOSE UR STRIP.AUTO-MCNC: NEGATIVE MG/DL
HGB UR QL STRIP.AUTO: ABNORMAL
HYALINE CASTS #/AREA URNS AUTO: 1 /LPF (ref 0–8)
KETONES UR STRIP.AUTO-MCNC: NEGATIVE MG/DL
LEUKOCYTE ESTERASE UR QL STRIP.AUTO: ABNORMAL
NITRITE UR QL STRIP.AUTO: NEGATIVE
PH UR STRIP.AUTO: 5.5 [PH] (ref 5–8)
PROT UR STRIP.AUTO-MCNC: 30 MG/DL
RBC CLUMPS #/AREA URNS AUTO: 175 /HPF (ref 0–4)
SP GR UR STRIP.AUTO: 1.01 (ref 1–1.03)
UA DIPSTICK W REFLEX MICRO PNL UR: YES
URN SPEC COLLECT METH UR: ABNORMAL
UROBILINOGEN UR STRIP-ACNC: 0.2 E.U./DL
WBC #/AREA URNS AUTO: 45 /HPF (ref 0–5)

## 2024-02-13 LAB — BACTERIA UR CULT: NORMAL

## 2024-02-15 ENCOUNTER — TELEPHONE (OUTPATIENT)
Dept: INFECTIOUS DISEASES | Age: 89
End: 2024-02-15

## 2024-02-15 NOTE — TELEPHONE ENCOUNTER
Spoke with family member yesterday, lab didn't have necessary equipment at that time. Pt will get them completed this week.

## 2024-02-15 NOTE — RESULT ENCOUNTER NOTE
Urine cx negative but I do not see Blood cx he was supposed to get Blood cx please make sure they are drawn as well - thx Let him know

## 2024-02-18 LAB
BACTERIA BLD CULT ORG #2: NORMAL
BACTERIA BLD CULT ORG #3: NORMAL

## 2024-02-19 ENCOUNTER — TELEPHONE (OUTPATIENT)
Dept: INFECTIOUS DISEASES | Age: 89
End: 2024-02-19

## 2024-02-20 PROBLEM — R07.81 RIB PAIN ON LEFT SIDE: Status: ACTIVE | Noted: 2024-02-20

## 2024-02-20 ASSESSMENT — ENCOUNTER SYMPTOMS
COUGH: 0
SORE THROAT: 0
RHINORRHEA: 0
WHEEZING: 0
CHEST TIGHTNESS: 0
CONSTIPATION: 0
SHORTNESS OF BREATH: 0
NAUSEA: 0
DIARRHEA: 0
ABDOMINAL PAIN: 0
VOMITING: 0

## 2024-02-27 NOTE — PROGRESS NOTES
Rod Rodriguez    Age 88 y.o.    male    1935    MRN [id number]    3/19/2024  Arrival Time_____________  OR Time____________60 Min     Procedure(s):  CYSTOSCOPY, LEFT URETEROSCOPY, HOLMIUM LASER LITHOTRIPSY STONE MANIPULATION AND POSSIBLE LEFT STENT REMOVAL                      General   Surgeon(s):  Gaol Nair, MD      DAY ADMIT ___  SDS/OP ___  OUTPT IN BED ___        Phone [phone] (home)                  PCP _____________________ Phone_________________ Epic ( ) Epic CE ( ) Appt ________    NOTES: _________________________________________ Consult/Cardio _______________    ____________________________________________________________________________    ____________________________________________________________________________  PAT APPT DATE:________ TIME: ________  FAXED QAD: _______  (__) H&P w/ Hospitalist    (__) PAT orders in EPIC    (__) Meet with PAT nurse  __________________________________________________________________________  Preop Nurse phone screen complete: _____________  (__) CBC     (__) W/ DIFF ___________  (__) CT CHEST  __________   (__) Hgb A1C    ___________  (__) CHEST X RAY   __________  (__) LIPID PROFILE  ___________  (__) EKG   __________  (__) PT-INR / APTT  ___________  (__) PFT's   __________  (__) BMP   ___________  (__) CAROTIDS  __________  (__) CMP   ___________  (__) VEIN MAPPING  __________  (__) U/A   ___________  (__) HISTORY & PHYSICAL __________  (__) URINE C & S  ___________  (__) CARDIAC CLEARANCE __________  (__) U/A W/ FLEX  ___________  (__) PULM. CLEARANCE __________  (__) SERUM PREGNANCY ___________  (__) Preop Orders in EPIC __________  (__) TYPE & SCREEN __________repeat ( ) (__)  __________________ __________  (__) Albumin   ___________  (__)  __________________ __________  (__) TRANSFERRIN  ___________  (__)  __________________ __________  (__) LIVER PROFILE  ___________  (__) URINE PREG DOS __________  (__) MRSA NASAL SWAB ___________  (__) BLOOD

## 2024-03-05 ENCOUNTER — OFFICE VISIT (OUTPATIENT)
Dept: PRIMARY CARE CLINIC | Age: 89
End: 2024-03-05
Payer: MEDICARE

## 2024-03-05 VITALS
HEART RATE: 85 BPM | SYSTOLIC BLOOD PRESSURE: 100 MMHG | RESPIRATION RATE: 16 BRPM | TEMPERATURE: 97.2 F | WEIGHT: 162.2 LBS | OXYGEN SATURATION: 97 % | DIASTOLIC BLOOD PRESSURE: 62 MMHG | BODY MASS INDEX: 22 KG/M2

## 2024-03-05 DIAGNOSIS — D50.9 IRON DEFICIENCY ANEMIA, UNSPECIFIED IRON DEFICIENCY ANEMIA TYPE: ICD-10-CM

## 2024-03-05 DIAGNOSIS — N18.32 STAGE 3B CHRONIC KIDNEY DISEASE (HCC): ICD-10-CM

## 2024-03-05 DIAGNOSIS — F32.5 MAJOR DEPRESSION, SINGLE EPISODE, IN COMPLETE REMISSION (HCC): Primary | ICD-10-CM

## 2024-03-05 DIAGNOSIS — E11.9 TYPE 2 DIABETES MELLITUS WITHOUT COMPLICATION, WITHOUT LONG-TERM CURRENT USE OF INSULIN (HCC): ICD-10-CM

## 2024-03-05 DIAGNOSIS — I10 ESSENTIAL HYPERTENSION: ICD-10-CM

## 2024-03-05 DIAGNOSIS — I50.22 CHRONIC SYSTOLIC HEART FAILURE (HCC): ICD-10-CM

## 2024-03-05 LAB
ANION GAP SERPL CALCULATED.3IONS-SCNC: 13 MMOL/L (ref 3–16)
BUN SERPL-MCNC: 28 MG/DL (ref 7–20)
CALCIUM SERPL-MCNC: 9.4 MG/DL (ref 8.3–10.6)
CHLORIDE SERPL-SCNC: 103 MMOL/L (ref 99–110)
CO2 SERPL-SCNC: 24 MMOL/L (ref 21–32)
CREAT SERPL-MCNC: 1.4 MG/DL (ref 0.8–1.3)
DEPRECATED RDW RBC AUTO: 15.8 % (ref 12.4–15.4)
GFR SERPLBLD CREATININE-BSD FMLA CKD-EPI: 49 ML/MIN/{1.73_M2}
GLUCOSE SERPL-MCNC: 98 MG/DL (ref 70–99)
HCT VFR BLD AUTO: 33.8 % (ref 40.5–52.5)
HGB BLD-MCNC: 11.4 G/DL (ref 13.5–17.5)
IRON SATN MFR SERPL: 21 % (ref 20–50)
IRON SERPL-MCNC: 61 UG/DL (ref 59–158)
MCH RBC QN AUTO: 34.1 PG (ref 26–34)
MCHC RBC AUTO-ENTMCNC: 33.8 G/DL (ref 31–36)
MCV RBC AUTO: 101 FL (ref 80–100)
PLATELET # BLD AUTO: 355 K/UL (ref 135–450)
PMV BLD AUTO: 7.4 FL (ref 5–10.5)
POTASSIUM SERPL-SCNC: 5 MMOL/L (ref 3.5–5.1)
RBC # BLD AUTO: 3.35 M/UL (ref 4.2–5.9)
SODIUM SERPL-SCNC: 140 MMOL/L (ref 136–145)
TIBC SERPL-MCNC: 291 UG/DL (ref 260–445)
WBC # BLD AUTO: 10.2 K/UL (ref 4–11)

## 2024-03-05 PROCEDURE — 1123F ACP DISCUSS/DSCN MKR DOCD: CPT | Performed by: INTERNAL MEDICINE

## 2024-03-05 PROCEDURE — 99214 OFFICE O/P EST MOD 30 MIN: CPT | Performed by: INTERNAL MEDICINE

## 2024-03-05 NOTE — PROGRESS NOTES
Genitourinary:  Negative for dysuria, frequency, hematuria and urgency.   Musculoskeletal:  Negative for arthralgias and myalgias.   Skin:  Negative for wound.   Neurological:  Negative for dizziness, syncope, light-headedness, numbness and headaches.   Psychiatric/Behavioral:  Positive for dysphoric mood. Negative for sleep disturbance. The patient is not nervous/anxious.        Allergies   Allergen Reactions    Isosorbide Nitrate Other (See Comments)     headache    Nitroglycerin Other (See Comments)     headache    Meropenem Rash       Outpatient Medications Marked as Taking for the 3/5/24 encounter (Office Visit) with Esme Sullivan MD   Medication Sig Dispense Refill    sertraline (ZOLOFT) 100 MG tablet Take 1 tablet by mouth daily 30 tablet 0    ferrous sulfate (IRON 325) 325 (65 Fe) MG tablet Take 1 tablet by mouth every other day      atorvastatin (LIPITOR) 40 MG tablet Take 1 tablet by mouth nightly      Multiple Vitamins-Minerals (PRESERVISION AREDS PO) Take by mouth      loratadine (CLARITIN) 10 MG tablet Take 1 tablet by mouth daily (Patient taking differently: Take 1 tablet by mouth as needed) 30 tablet 1    finasteride (PROSCAR) 5 MG tablet Take 1 tablet by mouth daily      tamsulosin (FLOMAX) 0.4 MG capsule Take 1 capsule by mouth daily      ramipril (ALTACE) 2.5 MG capsule Take 1 capsule by mouth daily      acetaminophen (TYLENOL) 325 MG tablet Take 2 tablets by mouth every 6 hours as needed for Pain      aspirin 81 MG tablet Take 1 tablet by mouth daily      metoprolol (TOPROL-XL) 25 MG XL tablet Take 1 tablet by mouth 2 times daily      clopidogrel (PLAVIX) 75 MG tablet Take 1 tablet by mouth daily      Multiple Vitamins-Minerals (THERAPEUTIC MULTIVITAMIN-MINERALS) tablet Take 1 tablet by mouth daily      fluticasone (FLONASE) 50 MCG/ACT nasal spray 2 sprays by Nasal route daily. 1 Bottle 0       Social History     Tobacco Use    Smoking status: Former     Current packs/day: 0.00     Types:

## 2024-03-07 RX ORDER — MIDODRINE HYDROCHLORIDE 2.5 MG/1
2.5 TABLET ORAL 3 TIMES DAILY
COMMUNITY

## 2024-03-07 RX ORDER — MIDODRINE HYDROCHLORIDE 5 MG/1
5 TABLET ORAL 3 TIMES DAILY
COMMUNITY

## 2024-03-08 ENCOUNTER — TELEPHONE (OUTPATIENT)
Dept: PRIMARY CARE CLINIC | Age: 89
End: 2024-03-08

## 2024-03-08 NOTE — TELEPHONE ENCOUNTER
Radha from [facility] calling to see if Dr. Sullivan's note from 3/5 can be addended  to state that pt is clearly for surgery. Ph #[phone]

## 2024-03-08 NOTE — PROGRESS NOTES
Surgery Date and Time: 3/19/24 @ 11:30 AM   Arrival Time:  09:30 AM    The instructions given when and if a patient needs to stop oral intake prior to surgery varies. Follow the instructions you were given by your    Surgeon or RN during the Pre-op call.       __X__Nothing to eat or to drink after Midnight the night before the surgery. NO gum, mints, candy or ice chips day of surgery.                     Only take the following medications with a small sip of water the morning of surgery:  METOPROLOL, MIDODRINE                 Hold the following medications (per anesthesia or surgeon request):  ramipril          Last Dose:  3/18/24 am      Aspirin, Ibuprofen, Advil, Naproxen, Vitamin E and other Anti-inflammatory products and supplements should be stopped for 5 -7days before surgery      or as directed by your physician. Aspirin, checking with cardiologist, faxed request sent 3/8/24.      Check with your Surgeon/PCP/Cardiologist regarding stopping Plavix, Coumadin, Eliquis, Lovenox, Effient, Pradaxa, Xarelto, Fragmin or other blood thinners     and follow their instructions.  Medication:  plavix               Last dose: checking with cardiologist, sent faxed request 3/8/24                - If you take a long acting-insulin, please ask your Primary Care Physician if you should decrease your dose the night before surgery.    - Do not smoke or vape, and do not drink any alcoholic beverages 24 hours prior to surgery, this includes NA Beer. Refrain from using any recreational drugs,     including non-prescribed prescription drugs.     -You may brush your teeth and gargle the morning of surgery.  DO NOT SWALLOW WATER.    -You MUST plan for a responsible adult to stay on site while you are here and take you home after your surgery. You will not be allowed to leave alone or drive               yourself home. It is requested someone stay with you the first 24 hrs. Your surgery will be cancelled if you do not have a ride

## 2024-03-10 DIAGNOSIS — F32.5 MAJOR DEPRESSION, SINGLE EPISODE, IN COMPLETE REMISSION (HCC): ICD-10-CM

## 2024-03-11 RX ORDER — SERTRALINE HYDROCHLORIDE 100 MG/1
100 TABLET, FILM COATED ORAL DAILY
Qty: 90 TABLET | Refills: 0 | Status: SHIPPED | OUTPATIENT
Start: 2024-03-11

## 2024-03-11 NOTE — PROGRESS NOTES
Preoperative Consultation    Name: Rod Rodriguez  YOB: 1935    This patient presents to the office today for a preoperative consultation at the request of surgeon, Dr.A. Nair, who plans on performing CYSTOSCOPY, LEFT URETEROSCOPY, HOLMIUM LASER LITHOTRIPSY STONE MANIPULATION AND POSSIBLE LEFT STENT REMOVAL  on March 19, 2024 at [facility].      Planned anesthesia: General   Known anesthesia problems: None   Bleeding risk: Anticoagulant therapy- on plavix and asa daily.   Personal or FH ofDVT/PE: No      Daughter is also present and contributes to history    UROLOGY  History of kidney stones.    Hospitalized 1/25/2024 to 1/30/24 for complicated UTI, calculus of distal ureter and acute kidney injury.  Stent placed 1/26/24  8mm stone   Patient has been instructed to stop all vitamins and supplements last week  History of complicated UTI.  Following with infectious disease    NEPHRO  History of CKD  Follows with nephrology  Recent labs completed    CARDIO  Follows with cardiology  History of CHF, LBBB, mild to moderate CAD  S/p TAVR 12/2022  S/p ICD/PM  On aspirin, Plavix, metoprolol, midodrine, atorvastatin, ramipril  Last appointment 12/1/2023  Cardiac clearance obtained 3/8/2024.  Per cardiology \"okay to hold aspirin and Plavix x 7 days prior\"  Letter faxed per cardiology    HYPERTENSION  On ramipril 2.5 mg daily  On metoprolol ER 25 mg twice daily    PREDIABETES  Last A1C 5.6  Stable    MOOD  On sertraline 100 mg daily    Patient Active Problem List   Diagnosis    Left hand pain    Asymptomatic bilateral carotid artery stenosis    Normocytic normochromic anemia    Knee pain, bilateral    Right ankle swelling    Foot swelling    Primary osteoarthritis of left knee    Essential hypertension    Mixed hyperlipidemia    History of artificial joint    History of anticoagulant therapy    Benign prostatic hyperplasia with urinary obstruction    Asymptomatic carotid artery stenosis    Aftercare following

## 2024-03-11 NOTE — TELEPHONE ENCOUNTER
Medication:   Requested Prescriptions     Pending Prescriptions Disp Refills    sertraline (ZOLOFT) 100 MG tablet [Pharmacy Med Name: Sertraline HCl Oral Tablet 100 MG] 30 tablet 0     Sig: TAKE 1 TABLET BY MOUTH DAILY     Last Filled:  2.6.24    Last appt: 3/5/2024   Next appt: 4/17/2024    Last OARRS:        No data to display

## 2024-03-11 NOTE — TELEPHONE ENCOUNTER
Call Radha at [facility]. Patient will need a preoperative exam for surgery. Call patient to schedule preoperative exam.

## 2024-03-11 NOTE — TELEPHONE ENCOUNTER
Spoke with Radha, told her we will call patient to schedule pre op. Called Patient daughter Kari and she said she will call back. They need a stop date for patient plavix and aspirin please.

## 2024-03-12 ENCOUNTER — OFFICE VISIT (OUTPATIENT)
Dept: PRIMARY CARE CLINIC | Age: 89
End: 2024-03-12

## 2024-03-12 VITALS
DIASTOLIC BLOOD PRESSURE: 68 MMHG | WEIGHT: 166 LBS | SYSTOLIC BLOOD PRESSURE: 132 MMHG | HEART RATE: 82 BPM | HEIGHT: 72 IN | OXYGEN SATURATION: 96 % | TEMPERATURE: 97.4 F | BODY MASS INDEX: 22.48 KG/M2

## 2024-03-12 DIAGNOSIS — N20.0 KIDNEY STONE: Primary | ICD-10-CM

## 2024-03-12 DIAGNOSIS — Z01.818 PREOP EXAMINATION: ICD-10-CM

## 2024-03-12 DIAGNOSIS — E11.9 TYPE 2 DIABETES MELLITUS WITHOUT COMPLICATION, WITHOUT LONG-TERM CURRENT USE OF INSULIN (HCC): ICD-10-CM

## 2024-03-12 DIAGNOSIS — I10 ESSENTIAL HYPERTENSION: ICD-10-CM

## 2024-03-12 DIAGNOSIS — N39.0 COMPLICATED URINARY TRACT INFECTION: ICD-10-CM

## 2024-03-12 DIAGNOSIS — I44.7 BLOCK, BUNDLE BRANCH, LEFT: ICD-10-CM

## 2024-03-12 DIAGNOSIS — I25.10 CAD IN NATIVE ARTERY: ICD-10-CM

## 2024-03-12 DIAGNOSIS — D50.9 IRON DEFICIENCY ANEMIA, UNSPECIFIED IRON DEFICIENCY ANEMIA TYPE: ICD-10-CM

## 2024-03-12 DIAGNOSIS — N18.32 STAGE 3B CHRONIC KIDNEY DISEASE (HCC): ICD-10-CM

## 2024-03-12 DIAGNOSIS — Z95.810 AICD (AUTOMATIC CARDIOVERTER/DEFIBRILLATOR) PRESENT: ICD-10-CM

## 2024-03-12 DIAGNOSIS — I50.22 CHRONIC SYSTOLIC HEART FAILURE (HCC): ICD-10-CM

## 2024-03-12 ASSESSMENT — ENCOUNTER SYMPTOMS
WHEEZING: 0
SHORTNESS OF BREATH: 0
COUGH: 0
DIARRHEA: 0
VOMITING: 0
NAUSEA: 0

## 2024-03-13 PROBLEM — N18.32 STAGE 3B CHRONIC KIDNEY DISEASE (HCC): Status: ACTIVE | Noted: 2024-03-13

## 2024-03-13 ASSESSMENT — ENCOUNTER SYMPTOMS
SHORTNESS OF BREATH: 0
COUGH: 0
RHINORRHEA: 0
VOMITING: 0
CONSTIPATION: 0
DIARRHEA: 0
CHEST TIGHTNESS: 0
NAUSEA: 0
ABDOMINAL PAIN: 0
SORE THROAT: 0
WHEEZING: 0

## 2024-03-19 ENCOUNTER — APPOINTMENT (OUTPATIENT)
Dept: GENERAL RADIOLOGY | Age: 89
End: 2024-03-19
Attending: UROLOGY
Payer: MEDICARE

## 2024-03-19 ENCOUNTER — ANESTHESIA EVENT (OUTPATIENT)
Dept: OPERATING ROOM | Age: 89
End: 2024-03-19
Payer: MEDICARE

## 2024-03-19 ENCOUNTER — ANESTHESIA (OUTPATIENT)
Dept: OPERATING ROOM | Age: 89
End: 2024-03-19
Payer: MEDICARE

## 2024-03-19 ENCOUNTER — HOSPITAL ENCOUNTER (OUTPATIENT)
Age: 89
Setting detail: OUTPATIENT SURGERY
Discharge: HOME | End: 2024-03-19
Attending: UROLOGY | Admitting: UROLOGY
Payer: MEDICARE

## 2024-03-19 VITALS
HEART RATE: 65 BPM | OXYGEN SATURATION: 97 % | SYSTOLIC BLOOD PRESSURE: 116 MMHG | RESPIRATION RATE: 18 BRPM | HEIGHT: 72 IN | BODY MASS INDEX: 21.94 KG/M2 | DIASTOLIC BLOOD PRESSURE: 72 MMHG | WEIGHT: 162 LBS | TEMPERATURE: 97.5 F

## 2024-03-19 DIAGNOSIS — N20.1 CALCULUS OF URETER: ICD-10-CM

## 2024-03-19 LAB
GLUCOSE BLD-MCNC: 105 MG/DL (ref 70–99)
PERFORMED ON: ABNORMAL

## 2024-03-19 PROCEDURE — 2580000003 HC RX 258: Performed by: ANESTHESIOLOGY

## 2024-03-19 PROCEDURE — 7100000011 HC PHASE II RECOVERY - ADDTL 15 MIN: Performed by: UROLOGY

## 2024-03-19 PROCEDURE — 74018 RADEX ABDOMEN 1 VIEW: CPT

## 2024-03-19 PROCEDURE — 7100000000 HC PACU RECOVERY - FIRST 15 MIN: Performed by: UROLOGY

## 2024-03-19 PROCEDURE — 6360000004 HC RX CONTRAST MEDICATION: Performed by: UROLOGY

## 2024-03-19 PROCEDURE — 3700000000 HC ANESTHESIA ATTENDED CARE: Performed by: UROLOGY

## 2024-03-19 PROCEDURE — C1769 GUIDE WIRE: HCPCS | Performed by: UROLOGY

## 2024-03-19 PROCEDURE — 6360000002 HC RX W HCPCS: Performed by: NURSE ANESTHETIST, CERTIFIED REGISTERED

## 2024-03-19 PROCEDURE — 2720000010 HC SURG SUPPLY STERILE: Performed by: UROLOGY

## 2024-03-19 PROCEDURE — 3600000014 HC SURGERY LEVEL 4 ADDTL 15MIN: Performed by: UROLOGY

## 2024-03-19 PROCEDURE — 2500000003 HC RX 250 WO HCPCS: Performed by: NURSE ANESTHETIST, CERTIFIED REGISTERED

## 2024-03-19 PROCEDURE — 7100000010 HC PHASE II RECOVERY - FIRST 15 MIN: Performed by: UROLOGY

## 2024-03-19 PROCEDURE — 82365 CALCULUS SPECTROSCOPY: CPT

## 2024-03-19 PROCEDURE — C2617 STENT, NON-COR, TEM W/O DEL: HCPCS | Performed by: UROLOGY

## 2024-03-19 PROCEDURE — 3600000004 HC SURGERY LEVEL 4 BASE: Performed by: UROLOGY

## 2024-03-19 PROCEDURE — 6360000002 HC RX W HCPCS: Performed by: UROLOGY

## 2024-03-19 PROCEDURE — 3700000001 HC ADD 15 MINUTES (ANESTHESIA): Performed by: UROLOGY

## 2024-03-19 PROCEDURE — 7100000001 HC PACU RECOVERY - ADDTL 15 MIN: Performed by: UROLOGY

## 2024-03-19 PROCEDURE — 2709999900 HC NON-CHARGEABLE SUPPLY: Performed by: UROLOGY

## 2024-03-19 PROCEDURE — A4217 STERILE WATER/SALINE, 500 ML: HCPCS | Performed by: UROLOGY

## 2024-03-19 PROCEDURE — 2580000003 HC RX 258: Performed by: UROLOGY

## 2024-03-19 PROCEDURE — 88300 SURGICAL PATH GROSS: CPT

## 2024-03-19 DEVICE — URETERAL STENT
Type: IMPLANTABLE DEVICE | Site: URETER | Status: FUNCTIONAL
Brand: CONTOUR™

## 2024-03-19 RX ORDER — LIDOCAINE HYDROCHLORIDE 20 MG/ML
INJECTION, SOLUTION EPIDURAL; INFILTRATION; INTRACAUDAL; PERINEURAL PRN
Status: DISCONTINUED | OUTPATIENT
Start: 2024-03-19 | End: 2024-03-19 | Stop reason: SDUPTHER

## 2024-03-19 RX ORDER — DEXAMETHASONE SODIUM PHOSPHATE 4 MG/ML
INJECTION, SOLUTION INTRA-ARTICULAR; INTRALESIONAL; INTRAMUSCULAR; INTRAVENOUS; SOFT TISSUE PRN
Status: DISCONTINUED | OUTPATIENT
Start: 2024-03-19 | End: 2024-03-19 | Stop reason: SDUPTHER

## 2024-03-19 RX ORDER — IPRATROPIUM BROMIDE AND ALBUTEROL SULFATE 2.5; .5 MG/3ML; MG/3ML
1 SOLUTION RESPIRATORY (INHALATION)
Status: DISCONTINUED | OUTPATIENT
Start: 2024-03-19 | End: 2024-03-19 | Stop reason: HOSPADM

## 2024-03-19 RX ORDER — PHENYLEPHRINE HCL IN 0.9% NACL 1 MG/10 ML
SYRINGE (ML) INTRAVENOUS PRN
Status: DISCONTINUED | OUTPATIENT
Start: 2024-03-19 | End: 2024-03-19 | Stop reason: SDUPTHER

## 2024-03-19 RX ORDER — SODIUM CHLORIDE, SODIUM LACTATE, POTASSIUM CHLORIDE, CALCIUM CHLORIDE 600; 310; 30; 20 MG/100ML; MG/100ML; MG/100ML; MG/100ML
INJECTION, SOLUTION INTRAVENOUS CONTINUOUS
Status: DISCONTINUED | OUTPATIENT
Start: 2024-03-19 | End: 2024-03-19 | Stop reason: HOSPADM

## 2024-03-19 RX ORDER — EPHEDRINE SULFATE 50 MG/ML
INJECTION INTRAVENOUS PRN
Status: DISCONTINUED | OUTPATIENT
Start: 2024-03-19 | End: 2024-03-19 | Stop reason: SDUPTHER

## 2024-03-19 RX ORDER — SODIUM CHLORIDE 9 MG/ML
INJECTION, SOLUTION INTRAVENOUS PRN
Status: DISCONTINUED | OUTPATIENT
Start: 2024-03-19 | End: 2024-03-19 | Stop reason: HOSPADM

## 2024-03-19 RX ORDER — PROCHLORPERAZINE EDISYLATE 5 MG/ML
5 INJECTION INTRAMUSCULAR; INTRAVENOUS
Status: DISCONTINUED | OUTPATIENT
Start: 2024-03-19 | End: 2024-03-19 | Stop reason: HOSPADM

## 2024-03-19 RX ORDER — SODIUM CHLORIDE 0.9 % (FLUSH) 0.9 %
5-40 SYRINGE (ML) INJECTION PRN
Status: DISCONTINUED | OUTPATIENT
Start: 2024-03-19 | End: 2024-03-19 | Stop reason: HOSPADM

## 2024-03-19 RX ORDER — TRAMADOL HYDROCHLORIDE 50 MG/1
50 TABLET ORAL EVERY 6 HOURS PRN
Qty: 12 TABLET | Refills: 0 | Status: SHIPPED | OUTPATIENT
Start: 2024-03-19 | End: 2024-03-22

## 2024-03-19 RX ORDER — SULFAMETHOXAZOLE AND TRIMETHOPRIM 800; 160 MG/1; MG/1
1 TABLET ORAL 2 TIMES DAILY
Qty: 10 TABLET | Refills: 0 | Status: SHIPPED | OUTPATIENT
Start: 2024-03-19 | End: 2024-03-24

## 2024-03-19 RX ORDER — OXYCODONE HYDROCHLORIDE 5 MG/1
10 TABLET ORAL PRN
Status: DISCONTINUED | OUTPATIENT
Start: 2024-03-19 | End: 2024-03-19 | Stop reason: HOSPADM

## 2024-03-19 RX ORDER — LEVOFLOXACIN 5 MG/ML
500 INJECTION, SOLUTION INTRAVENOUS
Status: COMPLETED | OUTPATIENT
Start: 2024-03-19 | End: 2024-03-19

## 2024-03-19 RX ORDER — ONDANSETRON 2 MG/ML
INJECTION INTRAMUSCULAR; INTRAVENOUS PRN
Status: DISCONTINUED | OUTPATIENT
Start: 2024-03-19 | End: 2024-03-19 | Stop reason: SDUPTHER

## 2024-03-19 RX ORDER — MAGNESIUM HYDROXIDE 1200 MG/15ML
LIQUID ORAL CONTINUOUS PRN
Status: COMPLETED | OUTPATIENT
Start: 2024-03-19 | End: 2024-03-19

## 2024-03-19 RX ORDER — FENTANYL CITRATE 50 UG/ML
INJECTION, SOLUTION INTRAMUSCULAR; INTRAVENOUS PRN
Status: DISCONTINUED | OUTPATIENT
Start: 2024-03-19 | End: 2024-03-19 | Stop reason: SDUPTHER

## 2024-03-19 RX ORDER — LIDOCAINE HYDROCHLORIDE 10 MG/ML
2 INJECTION, SOLUTION INFILTRATION; PERINEURAL
Status: DISCONTINUED | OUTPATIENT
Start: 2024-03-19 | End: 2024-03-19 | Stop reason: HOSPADM

## 2024-03-19 RX ORDER — OXYCODONE HYDROCHLORIDE 5 MG/1
5 TABLET ORAL
Status: DISCONTINUED | OUTPATIENT
Start: 2024-03-19 | End: 2024-03-19 | Stop reason: HOSPADM

## 2024-03-19 RX ORDER — NALOXONE HYDROCHLORIDE 0.4 MG/ML
INJECTION, SOLUTION INTRAMUSCULAR; INTRAVENOUS; SUBCUTANEOUS PRN
Status: DISCONTINUED | OUTPATIENT
Start: 2024-03-19 | End: 2024-03-19 | Stop reason: HOSPADM

## 2024-03-19 RX ORDER — SODIUM CHLORIDE 0.9 % (FLUSH) 0.9 %
5-40 SYRINGE (ML) INJECTION EVERY 12 HOURS SCHEDULED
Status: DISCONTINUED | OUTPATIENT
Start: 2024-03-19 | End: 2024-03-19 | Stop reason: HOSPADM

## 2024-03-19 RX ORDER — ONDANSETRON 2 MG/ML
4 INJECTION INTRAMUSCULAR; INTRAVENOUS
Status: DISCONTINUED | OUTPATIENT
Start: 2024-03-19 | End: 2024-03-19 | Stop reason: HOSPADM

## 2024-03-19 RX ORDER — PROPOFOL 10 MG/ML
INJECTION, EMULSION INTRAVENOUS PRN
Status: DISCONTINUED | OUTPATIENT
Start: 2024-03-19 | End: 2024-03-19 | Stop reason: SDUPTHER

## 2024-03-19 RX ADMIN — DEXAMETHASONE SODIUM PHOSPHATE 4 MG: 4 INJECTION, SOLUTION INTRAMUSCULAR; INTRAVENOUS at 11:57

## 2024-03-19 RX ADMIN — EPHEDRINE SULFATE 10 MG: 50 INJECTION INTRAVENOUS at 12:10

## 2024-03-19 RX ADMIN — Medication 200 MCG: at 12:04

## 2024-03-19 RX ADMIN — EPHEDRINE SULFATE 10 MG: 50 INJECTION INTRAVENOUS at 12:08

## 2024-03-19 RX ADMIN — EPHEDRINE SULFATE 10 MG: 50 INJECTION INTRAVENOUS at 12:12

## 2024-03-19 RX ADMIN — LEVOFLOXACIN 500 MG: 5 INJECTION, SOLUTION INTRAVENOUS at 11:45

## 2024-03-19 RX ADMIN — SODIUM CHLORIDE, SODIUM LACTATE, POTASSIUM CHLORIDE, AND CALCIUM CHLORIDE: .6; .31; .03; .02 INJECTION, SOLUTION INTRAVENOUS at 11:45

## 2024-03-19 RX ADMIN — Medication 100 MCG: at 11:58

## 2024-03-19 RX ADMIN — FENTANYL CITRATE 50 MCG: 50 INJECTION, SOLUTION INTRAMUSCULAR; INTRAVENOUS at 11:58

## 2024-03-19 RX ADMIN — LIDOCAINE HYDROCHLORIDE 60 MG: 20 INJECTION, SOLUTION EPIDURAL; INFILTRATION; INTRACAUDAL; PERINEURAL at 11:51

## 2024-03-19 RX ADMIN — SODIUM CHLORIDE, SODIUM LACTATE, POTASSIUM CHLORIDE, AND CALCIUM CHLORIDE: .6; .31; .03; .02 INJECTION, SOLUTION INTRAVENOUS at 12:14

## 2024-03-19 RX ADMIN — ONDANSETRON 4 MG: 2 INJECTION INTRAMUSCULAR; INTRAVENOUS at 11:57

## 2024-03-19 RX ADMIN — PROPOFOL 100 MG: 10 INJECTION, EMULSION INTRAVENOUS at 11:51

## 2024-03-19 ASSESSMENT — PAIN SCALES - GENERAL
PAINLEVEL_OUTOF10: 0
PAINLEVEL_OUTOF10: 0

## 2024-03-19 NOTE — ANESTHESIA POSTPROCEDURE EVALUATION
Department of Anesthesiology  Postprocedure Note    Patient: Rod Rodriguez  MRN: [id number]  YOB: 1935  Date of evaluation: 3/19/2024    Procedure Summary       Date: 03/19/24 Room / Location: [address]    Anesthesia Start: 1145 Anesthesia Stop: 1231    Procedure: CYSTOSCOPY, LEFT URETEROSCOPY, LEFT RETROGRADE PYELOGRAM, LEFT URETERAL STONE MANIPULATION , LEFT STENT EXCHANGE (Left: Ureter) Diagnosis:       Calculus of ureter      (Calculus of ureter [N20.1])    Surgeons: Galo Nair MD Responsible Provider: Max Gagnon MD    Anesthesia Type: general ASA Status: 3            Anesthesia Type: No value filed.    Kallie Phase I: Akllie Score: 10    Kallie Phase II: Kallie Score: 10    Anesthesia Post Evaluation    Patient location during evaluation: PACU  Patient participation: complete - patient participated  Level of consciousness: awake and alert  Airway patency: patent  Nausea & Vomiting: no nausea and no vomiting  Cardiovascular status: hemodynamically stable  Respiratory status: acceptable  Hydration status: euvolemic  Pain management: adequate    No notable events documented.

## 2024-03-19 NOTE — BRIEF OP NOTE
Brief Postoperative Note      Patient: Rod Rodriguez  YOB: 1935  MRN: [id number]    Date of Procedure: 3/19/2024    Pre-Op Diagnosis Codes:     * Calculus of ureter [N20.1]    Post-Op Diagnosis: Same       Procedure(s):  CYSTOSCOPY, LEFT URETEROSCOPY, LEFT RETROGRADE PYELOGRAM, LEFT URETERAL STONE MANIPULATION , LEFT STENT EXCHANGE    Surgeon(s):  Galo Nair MD    Assistant:  First Assistant: Myles Willard RN    Anesthesia: General    Estimated Blood Loss (mL): Minimal    Complications: None    Specimens:   ID Type Source Tests Collected by Time Destination   A : LEFT URETERAL CALCULUS Tissue Kidney SURGICAL PATHOLOGY Galo Nair MD 3/19/2024 1217        Implants:  Implant Name Type Inv. Item Serial No.  Lot No. LRB No. Used Action   STENT URET 6FR L24CM PERCFLX HYDR+ SFT PGTL TAPR TIP W/O - [id number]  STENT URET 6FR L24CM PERCFLX HYDR+ SFT PGTL TAPR TIP W/O  [employer]- [id number] Left 1 Implanted         Drains: * No LDAs found *    Findings: large left distal ureteral stone    Plan- office visit next week for stent removal in the office      Electronically signed by Galo Nair MD on 3/19/2024 at 12:35 PM

## 2024-03-19 NOTE — PROGRESS NOTES
Pt brought to PACU. Report obtained from OR RN and anesthesia. Pt placed on monitor SR and O2 at  RA.

## 2024-03-19 NOTE — DISCHARGE INSTRUCTIONS
[facility]      Cystoscopy Discharge Instructions    Stent can be removed by pulling string in 1 week in the office.    You may experience : Burning sensation when you void     A feeling of a need to go to the bathroom frequently     Your urine may be blood tinged    These symptoms should be relieved within a few hours as you increase the amounts of fluids you drink and the number of times that you empty your bladder.  We recommended that you drink plenty of fluid a few days after surgery.    No strenuous activities until you talk to your doctor.    You may feel light headed up to 24 hours after anesthesia.  You should not do the following for the next 24 hours.       Drive a car, operate machinery or power tools     Drink any alcoholic drinks (not even beer or wine)     Make any important decisions, i.e., signing important papers.    It is recommended that you begin with clear liquids and/or light foods.  If you  Are not nauseated, progress to your normal diet.    I you are unable to urinate you should call your surgeon.    Dr. Nair   ANESTHESIA DISCHARGE INSTRUCTIONS    You are under the influence of drugs- do not drink alcohol, drive a car, operate machinery(such as power tools, kitchen appliances, etc), sign legal documents, or make any important decisions for 24 hours (or while on pain medications).   Children should not ride bikes or skate boards or play on gym sets  for 24 hours after surgery.  A responsible adult should be with you for 24 hours.  Rest at home today- increase activity as tolerated.  Progress slowly to a regular diet unless your physician has instructed you otherwise. Drink plenty of water.    CALL YOUR DOCTOR IF YOU:  Have moderate to severe nausea or vomiting AND are unable to hold down fluids or prescribed medications.  Have bright red bloody drainage from your dressing that won't stop oozing.  Do not get relief with your pain medication    NORMAL (POSSIBLE) SIDE EFFECTS FROM

## 2024-03-19 NOTE — ANESTHESIA PRE PROCEDURE
irregular plaque. No stenosis > 50%  Right Coronary:  Mid irregularities        Neuro/Psych:   (+) depression/anxiety             GI/Hepatic/Renal:   (+) renal disease (CKD3, BPH):          Endo/Other:    (+) blood dyscrasia: anticoagulation therapy:..                 Abdominal:             Vascular:   + PVD, aortic or cerebral.       Other Findings:       Anesthesia Plan      general     ASA 3       Induction: intravenous.      Anesthetic plan and risks discussed with patient.      Plan discussed with CRNA.                Max Gagnon MD   3/19/2024

## 2024-03-20 NOTE — OP NOTE
[address] [address]                            OPERATIVE REPORT      PATIENT NAME: ORLIN ESPARZA               : 1935  MED REC NO: [id number]                      ROOM: [facility]  ACCOUNT NO: [id number]                       ADMIT DATE: 2024  PROVIDER: Galo Nair MD      DATE OF PROCEDURE:  2024    SURGEON:  Galo Nair MD    PREOPERATIVE DIAGNOSES:  Retained left double-J ureteral stent and left ureteral stone.    POSTOPERATIVE DIAGNOSES:  Retained left double-J ureteral stent and left ureteral stone.    PROCEDURES PERFORMED:  Cystoscopy, left stent exchange, left ureteroscopy, left retrograde pyelogram, left stone basket extraction.    INDICATION FOR THE PROCEDURE:  The patient is an 88-year-old male, who had an infection and a left ureteral stone and had a stent placed in the past.  He has been on antibiotics.  He now presents for definitive treatment of the stone.  The risks and benefits were discussed with the patient, and he agreed to proceed.    DESCRIPTION OF PROCEDURE:  The patient had preoperative antibiotics, general anesthesia, was in lithotomy position.  Genitalia prepped and draped in the usual sterile fashion.  A 21-Martiniquais rigid cystoscope was inserted through the patient's urethra.  I looked in the patient's bladder, found the left stent coming out of the ureteric orifice, I took a grasper semi-scope, grasped the stent, pulled it out to the urethral meatus, and placed a ZIPwire up under fluoroscopic guidance into the left kidney.  I then removed the old stent.  I then looked in with a rigid ureteroscope.  I injected contrast, went up to dilate the left ureter and left kidney.  In the very distal ureter, there was a stone.  I took a Zero Tip Nitinol basket, and with some persistence, was able to navigate this out.  I then looked back in with the cystoscope after I had in placed a 6-Martiniquais 24 cm

## 2024-03-22 LAB
APPEARANCE STONE: NORMAL
COMPN STONE: NORMAL
SPECIMEN WT: 280 MG

## 2024-05-10 NOTE — TELEPHONE ENCOUNTER
Norma,  physical therapist assistant with [employer] calling to let Dr. Sullivan know that pts Bp is low, she has taken it twice and readings have been 82/47 and 86/58. Please call pt    2 seconds or less

## 2024-06-10 DIAGNOSIS — F32.5 MAJOR DEPRESSION, SINGLE EPISODE, IN COMPLETE REMISSION (HCC): ICD-10-CM

## 2024-06-10 RX ORDER — SERTRALINE HYDROCHLORIDE 100 MG/1
100 TABLET, FILM COATED ORAL DAILY
Qty: 90 TABLET | Refills: 0 | Status: SHIPPED | OUTPATIENT
Start: 2024-06-10

## 2024-06-10 NOTE — TELEPHONE ENCOUNTER
Medication:   Requested Prescriptions     Pending Prescriptions Disp Refills    sertraline (ZOLOFT) 100 MG tablet [Pharmacy Med Name: Sertraline HCl Oral Tablet 100 MG] 90 tablet 0     Sig: TAKE 1 TABLET BY MOUTH DAILY.     Last Filled:  3/11/24    Last appt: 3/12/2024   Next appt: Visit date not found    Last OARRS:        No data to display

## 2024-06-10 NOTE — TELEPHONE ENCOUNTER
Medication:   Requested Prescriptions     Pending Prescriptions Disp Refills    sertraline (ZOLOFT) 100 MG tablet [Pharmacy Med Name: Sertraline HCl Oral Tablet 100 MG] 90 tablet 0     Sig: TAKE 1 TABLET BY MOUTH DAILY.     Last Filled:  3.11.24    Last appt: 3/12/2024   Next appt: Visit date not found    Last OARRS:        No data to display

## 2024-09-09 DIAGNOSIS — F32.5 MAJOR DEPRESSION, SINGLE EPISODE, IN COMPLETE REMISSION (HCC): ICD-10-CM

## 2024-09-09 RX ORDER — SERTRALINE HYDROCHLORIDE 100 MG/1
100 TABLET, FILM COATED ORAL DAILY
Qty: 90 TABLET | Refills: 0 | Status: SHIPPED | OUTPATIENT
Start: 2024-09-09

## 2024-12-05 DIAGNOSIS — F32.5 MAJOR DEPRESSION, SINGLE EPISODE, IN COMPLETE REMISSION (HCC): ICD-10-CM

## 2024-12-05 RX ORDER — SERTRALINE HYDROCHLORIDE 100 MG/1
100 TABLET, FILM COATED ORAL DAILY
Qty: 90 TABLET | Refills: 0 | OUTPATIENT
Start: 2024-12-05

## 2024-12-05 NOTE — TELEPHONE ENCOUNTER
Medication:   Requested Prescriptions     Pending Prescriptions Disp Refills    sertraline (ZOLOFT) 100 MG tablet [Pharmacy Med Name: Sertraline HCl Oral Tablet 100 MG] 90 tablet 0     Sig: TAKE 1 TABLET BY MOUTH EVERY DAY.     Last Filled:  9/9/24    Last appt: 3/12/2024   Next appt: Visit date not found    Last OARRS:        No data to display              Spoke with patients daughterPatricia who said patient should have enough to get to appointment. If he does not she will contact us for a refill.

## 2024-12-12 ENCOUNTER — OFFICE VISIT (OUTPATIENT)
Dept: PRIMARY CARE CLINIC | Age: 89
End: 2024-12-12

## 2024-12-12 VITALS
WEIGHT: 171 LBS | DIASTOLIC BLOOD PRESSURE: 80 MMHG | OXYGEN SATURATION: 98 % | HEART RATE: 76 BPM | SYSTOLIC BLOOD PRESSURE: 120 MMHG | BODY MASS INDEX: 23.19 KG/M2

## 2024-12-12 DIAGNOSIS — F32.5 MAJOR DEPRESSION, SINGLE EPISODE, IN COMPLETE REMISSION (HCC): ICD-10-CM

## 2024-12-12 DIAGNOSIS — E78.2 MIXED HYPERLIPIDEMIA: Chronic | ICD-10-CM

## 2024-12-12 DIAGNOSIS — I10 ESSENTIAL HYPERTENSION: ICD-10-CM

## 2024-12-12 DIAGNOSIS — E11.9 TYPE 2 DIABETES MELLITUS WITHOUT COMPLICATION, WITHOUT LONG-TERM CURRENT USE OF INSULIN (HCC): Primary | ICD-10-CM

## 2024-12-12 DIAGNOSIS — R05.9 COUGH, UNSPECIFIED TYPE: ICD-10-CM

## 2024-12-12 LAB — HBA1C MFR BLD: 6.2 %

## 2024-12-12 RX ORDER — PREDNISONE 5 MG/1
5 TABLET ORAL EVERY OTHER DAY
COMMUNITY
Start: 2024-11-20

## 2024-12-12 SDOH — ECONOMIC STABILITY: FOOD INSECURITY: WITHIN THE PAST 12 MONTHS, THE FOOD YOU BOUGHT JUST DIDN'T LAST AND YOU DIDN'T HAVE MONEY TO GET MORE.: NEVER TRUE

## 2024-12-12 SDOH — ECONOMIC STABILITY: FOOD INSECURITY: WITHIN THE PAST 12 MONTHS, YOU WORRIED THAT YOUR FOOD WOULD RUN OUT BEFORE YOU GOT MONEY TO BUY MORE.: NEVER TRUE

## 2024-12-12 SDOH — ECONOMIC STABILITY: INCOME INSECURITY: HOW HARD IS IT FOR YOU TO PAY FOR THE VERY BASICS LIKE FOOD, HOUSING, MEDICAL CARE, AND HEATING?: NOT HARD AT ALL

## 2024-12-12 NOTE — PROGRESS NOTES
Date of Visit: 2024    Rod Rodriguez (:  1935) is a 89 y.o. male,  Established patient here for evaluation of the following chief complaint(s):  Hypertension and Diabetes      ASSESSMENT/PLAN:    1. Type 2 diabetes mellitus without complication, without long-term current use of insulin (HCC)  Assessment & Plan:  -Hemoglobin A1c of 6.2% shows diabetes is well controlled  -Continue diet control  -Limit carbohydrates to 45 grams with meals and 15 grams with snacks  -monitor blood sugars  -goal for blood sugar fasting or pre-meal  is   -goal for blood sugar 2 hours after a meal is less than 180  -goal for blood sugar at bedtime is less than 150  -Regular aerobic exercise  Orders:  -     POCT glycosylated hemoglobin (Hb A1C)  2. Essential hypertension  Assessment & Plan:  -stable  -Continue ramipril 2.5 mg once daily  -Continue metoprolol ER 25 mg 2 times daily  -Low sodium diet  -Regular aerobic exercise  3. Mixed hyperlipidemia  Assessment & Plan:  -Stable  -Continue atorvastatin 40 mg nightly  -low cholesterol, low fat diet  -regular aerobic exercise  Orders:  -     Lipid Panel; Future  -     Hepatic Function Panel; Future  4. Major depression, single episode, in complete remission (HCC)  Assessment & Plan:  - Stable  - Continue sertraline 100 mg once daily  -Continue counseling  5. Cough, unspecified type  Assessment & Plan:  -Cough when food hits a certain part of the throat  -Referral to ENT  Orders:  -     Non [facility] - External Referral To ENT      Return in about 25 days (around 2025) for Medicare AW.    SUBJECTIVE:    Patient has Type 2 Diabetes. Patient is diet controlled. Patient states he eats a slice or two of bread at lunch. Patient states he eats the same couple of candies in the evening. Patient states he drinks Ensure Plus for lunch. Patient does not monitor his blood sugar. Patient states he walks daily with a stand up walker for 30 minutes 5 days per week if it is not

## 2024-12-13 DIAGNOSIS — F32.5 MAJOR DEPRESSION, SINGLE EPISODE, IN COMPLETE REMISSION (HCC): ICD-10-CM

## 2024-12-13 LAB
ALBUMIN SERPL-MCNC: 4.4 G/DL (ref 3.4–5)
ALP SERPL-CCNC: 84 U/L (ref 40–129)
ALT SERPL-CCNC: 21 U/L (ref 10–40)
AST SERPL-CCNC: 22 U/L (ref 15–37)
BILIRUB DIRECT SERPL-MCNC: <0.1 MG/DL (ref 0–0.3)
BILIRUB INDIRECT SERPL-MCNC: NORMAL MG/DL (ref 0–1)
BILIRUB SERPL-MCNC: <0.2 MG/DL (ref 0–1)
CHOLEST SERPL-MCNC: 137 MG/DL (ref 0–199)
HDLC SERPL-MCNC: 43 MG/DL (ref 40–60)
LDLC SERPL CALC-MCNC: 70 MG/DL
PROT SERPL-MCNC: 6.8 G/DL (ref 6.4–8.2)
TRIGL SERPL-MCNC: 122 MG/DL (ref 0–150)
VLDLC SERPL CALC-MCNC: 24 MG/DL

## 2024-12-13 RX ORDER — SERTRALINE HYDROCHLORIDE 100 MG/1
100 TABLET, FILM COATED ORAL DAILY
Qty: 90 TABLET | Refills: 0 | Status: SHIPPED | OUTPATIENT
Start: 2024-12-13

## 2024-12-13 NOTE — TELEPHONE ENCOUNTER
Medication:   Requested Prescriptions     Pending Prescriptions Disp Refills    sertraline (ZOLOFT) 100 MG tablet [Pharmacy Med Name: Sertraline HCl Oral Tablet 100 MG] 90 tablet 0     Sig: TAKE 1 TABLET BY MOUTH EVERY DAY.     Last Filled:  9/9/24    Last appt: 12/12/2024   Next appt: Visit date not found    Last OARRS:        No data to display

## 2024-12-28 PROBLEM — R05.9 COUGH: Status: ACTIVE | Noted: 2024-12-28

## 2024-12-28 ASSESSMENT — ENCOUNTER SYMPTOMS
CHEST TIGHTNESS: 0
RHINORRHEA: 0
CONSTIPATION: 0
COUGH: 1
SORE THROAT: 0
ABDOMINAL PAIN: 0
DIARRHEA: 0
NAUSEA: 0
SHORTNESS OF BREATH: 0
WHEEZING: 0
VOMITING: 0

## 2024-12-28 NOTE — ASSESSMENT & PLAN NOTE
-Hemoglobin A1c of 6.2% since diabetes is well controlled  -Continue diet control  -Limit carbohydrates to 45 grams with meals and 15 grams with snacks  -monitor blood sugars  -goal for blood sugar fasting or pre-meal  is   -goal for blood sugar 2 hours after a meal is less than 180  -goal for blood sugar at bedtime is less than 150  -Regular aerobic exercise

## 2024-12-28 NOTE — ASSESSMENT & PLAN NOTE
-stable  -Continue ramipril 2.5 mg once daily  -Continue metoprolol ER 25 mg 2 times daily  -Low sodium diet  -Regular aerobic exercise

## 2024-12-28 NOTE — ASSESSMENT & PLAN NOTE
-Stable  -Continue atorvastatin 40 mg nightly  -low cholesterol, low fat diet  -regular aerobic exercise

## 2025-01-27 PROBLEM — R05.9 COUGH: Status: RESOLVED | Noted: 2024-12-28 | Resolved: 2025-01-27

## 2025-03-07 DIAGNOSIS — F32.5 MAJOR DEPRESSION, SINGLE EPISODE, IN COMPLETE REMISSION: ICD-10-CM

## 2025-03-07 RX ORDER — SERTRALINE HYDROCHLORIDE 100 MG/1
100 TABLET, FILM COATED ORAL DAILY
Qty: 90 TABLET | Refills: 0 | Status: SHIPPED | OUTPATIENT
Start: 2025-03-07

## 2025-03-07 NOTE — TELEPHONE ENCOUNTER
Medication:   Requested Prescriptions     Pending Prescriptions Disp Refills    sertraline (ZOLOFT) 100 MG tablet [Pharmacy Med Name: Sertraline HCl Oral Tablet 100 MG] 90 tablet 0     Sig: TAKE 1 TABLET BY MOUTH EVERY DAY.     Last Filled:  12/13/24    Last appt: 12/12/2024   Next appt: Visit date not found    Last OARRS:        No data to display

## 2025-05-02 PROBLEM — R06.02 SHORTNESS OF BREATH: Status: RESOLVED | Noted: 2020-08-22 | Resolved: 2025-05-02

## 2025-05-02 PROBLEM — D50.9 IRON DEFICIENCY ANEMIA: Status: RESOLVED | Noted: 2023-08-07 | Resolved: 2025-05-02

## 2025-05-02 PROBLEM — R71.8 ELEVATED MCV: Status: RESOLVED | Noted: 2023-09-17 | Resolved: 2025-05-02

## 2025-05-02 PROBLEM — N30.00 ACUTE CYSTITIS WITHOUT HEMATURIA: Status: RESOLVED | Noted: 2024-01-27 | Resolved: 2025-05-02

## 2025-05-02 PROBLEM — R65.20 SEPSIS WITH ACUTE RENAL FAILURE AND TUBULAR NECROSIS WITHOUT SEPTIC SHOCK (HCC): Status: RESOLVED | Noted: 2024-01-27 | Resolved: 2025-05-02

## 2025-05-02 PROBLEM — I50.22 CHRONIC SYSTOLIC HEART FAILURE (HCC): Status: RESOLVED | Noted: 2017-01-16 | Resolved: 2025-05-02

## 2025-05-02 PROBLEM — N17.0 SEPSIS WITH ACUTE RENAL FAILURE AND TUBULAR NECROSIS WITHOUT SEPTIC SHOCK (HCC): Status: RESOLVED | Noted: 2024-01-27 | Resolved: 2025-05-02

## 2025-05-02 PROBLEM — R31.9 HEMATURIA: Chronic | Status: RESOLVED | Noted: 2017-07-26 | Resolved: 2025-05-02

## 2025-05-02 PROBLEM — R74.8 ELEVATED ALKALINE PHOSPHATASE LEVEL: Status: RESOLVED | Noted: 2023-08-07 | Resolved: 2025-05-02

## 2025-05-02 PROBLEM — M25.512 LEFT SHOULDER PAIN: Status: RESOLVED | Noted: 2023-07-02 | Resolved: 2025-05-02

## 2025-05-02 PROBLEM — M65.30 TRIGGER FINGER: Status: RESOLVED | Noted: 2017-04-24 | Resolved: 2025-05-02

## 2025-05-02 PROBLEM — B96.1 BACTEREMIA DUE TO KLEBSIELLA PNEUMONIAE: Status: RESOLVED | Noted: 2024-01-27 | Resolved: 2025-05-02

## 2025-05-02 PROBLEM — R07.81 RIB PAIN ON LEFT SIDE: Status: RESOLVED | Noted: 2024-02-20 | Resolved: 2025-05-02

## 2025-05-02 PROBLEM — R09.81 NASAL CONGESTION: Status: RESOLVED | Noted: 2021-06-28 | Resolved: 2025-05-02

## 2025-05-02 PROBLEM — R63.4 WEIGHT LOSS: Status: RESOLVED | Noted: 2023-06-26 | Resolved: 2025-05-02

## 2025-05-02 PROBLEM — R31.9 URINARY TRACT INFECTION WITH HEMATURIA: Status: RESOLVED | Noted: 2024-01-04 | Resolved: 2025-05-02

## 2025-05-02 PROBLEM — N39.0 URINARY TRACT INFECTION WITH HEMATURIA: Status: RESOLVED | Noted: 2024-01-04 | Resolved: 2025-05-02

## 2025-05-02 PROBLEM — A41.9 SEPSIS WITH ACUTE RENAL FAILURE AND TUBULAR NECROSIS WITHOUT SEPTIC SHOCK (HCC): Status: RESOLVED | Noted: 2024-01-27 | Resolved: 2025-05-02

## 2025-05-02 PROBLEM — R79.89 LOW SERUM CALCIUM: Status: RESOLVED | Noted: 2024-01-04 | Resolved: 2025-05-02

## 2025-05-02 PROBLEM — N17.9 AKI (ACUTE KIDNEY INJURY): Status: RESOLVED | Noted: 2024-01-27 | Resolved: 2025-05-02

## 2025-05-02 PROBLEM — R68.83 CHILLS: Status: RESOLVED | Noted: 2020-08-22 | Resolved: 2025-05-02

## 2025-05-02 PROBLEM — N39.0 COMPLICATED URINARY TRACT INFECTION: Status: RESOLVED | Noted: 2024-01-25 | Resolved: 2025-05-02

## 2025-05-02 PROBLEM — R78.81 BACTEREMIA DUE TO KLEBSIELLA PNEUMONIAE: Status: RESOLVED | Noted: 2024-01-27 | Resolved: 2025-05-02

## 2025-06-05 DIAGNOSIS — F32.5 MAJOR DEPRESSION, SINGLE EPISODE, IN COMPLETE REMISSION: ICD-10-CM

## 2025-06-05 NOTE — TELEPHONE ENCOUNTER
Medication:   Requested Prescriptions     Pending Prescriptions Disp Refills    sertraline (ZOLOFT) 100 MG tablet [Pharmacy Med Name: Sertraline HCl Oral Tablet 100 MG] 90 tablet 0     Sig: TAKE 1 TABLET BY MOUTH EVERY DAY     Last Filled:  3.7.25    Last appt: 12/12/2024   Next appt: Visit date not found  Pt  states he will have daughter call to make the appointment.    Last OARRS:        No data to display

## 2025-06-07 RX ORDER — SERTRALINE HYDROCHLORIDE 100 MG/1
100 TABLET, FILM COATED ORAL DAILY
Qty: 90 TABLET | Refills: 0 | Status: SHIPPED | OUTPATIENT
Start: 2025-06-07

## 2025-06-19 ENCOUNTER — OFFICE VISIT (OUTPATIENT)
Dept: PRIMARY CARE CLINIC | Age: 89
End: 2025-06-19

## 2025-06-19 VITALS
SYSTOLIC BLOOD PRESSURE: 102 MMHG | RESPIRATION RATE: 14 BRPM | HEIGHT: 73 IN | WEIGHT: 175 LBS | TEMPERATURE: 96.8 F | DIASTOLIC BLOOD PRESSURE: 60 MMHG | HEART RATE: 66 BPM | BODY MASS INDEX: 23.19 KG/M2 | OXYGEN SATURATION: 96 %

## 2025-06-19 DIAGNOSIS — F32.5 MAJOR DEPRESSION, SINGLE EPISODE, IN COMPLETE REMISSION: ICD-10-CM

## 2025-06-19 DIAGNOSIS — I10 ESSENTIAL HYPERTENSION: ICD-10-CM

## 2025-06-19 DIAGNOSIS — I50.22 CHRONIC SYSTOLIC HEART FAILURE (HCC): ICD-10-CM

## 2025-06-19 DIAGNOSIS — E11.9 TYPE 2 DIABETES MELLITUS WITHOUT COMPLICATION, WITHOUT LONG-TERM CURRENT USE OF INSULIN (HCC): Primary | ICD-10-CM

## 2025-06-19 DIAGNOSIS — M79.604 RIGHT LEG PAIN: ICD-10-CM

## 2025-06-19 DIAGNOSIS — D50.9 IRON DEFICIENCY ANEMIA, UNSPECIFIED IRON DEFICIENCY ANEMIA TYPE: ICD-10-CM

## 2025-06-19 DIAGNOSIS — N18.32 STAGE 3B CHRONIC KIDNEY DISEASE (HCC): ICD-10-CM

## 2025-06-19 DIAGNOSIS — E78.2 MIXED HYPERLIPIDEMIA: Chronic | ICD-10-CM

## 2025-06-19 RX ORDER — DICLOFENAC SODIUM 75 MG/1
75 TABLET, DELAYED RELEASE ORAL EVERY OTHER DAY
COMMUNITY
Start: 2025-06-11

## 2025-06-19 SDOH — ECONOMIC STABILITY: FOOD INSECURITY: WITHIN THE PAST 12 MONTHS, THE FOOD YOU BOUGHT JUST DIDN'T LAST AND YOU DIDN'T HAVE MONEY TO GET MORE.: NEVER TRUE

## 2025-06-19 SDOH — ECONOMIC STABILITY: FOOD INSECURITY: WITHIN THE PAST 12 MONTHS, YOU WORRIED THAT YOUR FOOD WOULD RUN OUT BEFORE YOU GOT MONEY TO BUY MORE.: NEVER TRUE

## 2025-06-19 ASSESSMENT — PATIENT HEALTH QUESTIONNAIRE - PHQ9
3. TROUBLE FALLING OR STAYING ASLEEP: NOT AT ALL
8. MOVING OR SPEAKING SO SLOWLY THAT OTHER PEOPLE COULD HAVE NOTICED. OR THE OPPOSITE, BEING SO FIGETY OR RESTLESS THAT YOU HAVE BEEN MOVING AROUND A LOT MORE THAN USUAL: NOT AT ALL
10. IF YOU CHECKED OFF ANY PROBLEMS, HOW DIFFICULT HAVE THESE PROBLEMS MADE IT FOR YOU TO DO YOUR WORK, TAKE CARE OF THINGS AT HOME, OR GET ALONG WITH OTHER PEOPLE: SOMEWHAT DIFFICULT
5. POOR APPETITE OR OVEREATING: NOT AT ALL
2. FEELING DOWN, DEPRESSED OR HOPELESS: NOT AT ALL
7. TROUBLE CONCENTRATING ON THINGS, SUCH AS READING THE NEWSPAPER OR WATCHING TELEVISION: NOT AT ALL
SUM OF ALL RESPONSES TO PHQ QUESTIONS 1-9: 1
9. THOUGHTS THAT YOU WOULD BE BETTER OFF DEAD, OR OF HURTING YOURSELF: NOT AT ALL
4. FEELING TIRED OR HAVING LITTLE ENERGY: SEVERAL DAYS
SUM OF ALL RESPONSES TO PHQ QUESTIONS 1-9: 1
6. FEELING BAD ABOUT YOURSELF - OR THAT YOU ARE A FAILURE OR HAVE LET YOURSELF OR YOUR FAMILY DOWN: NOT AT ALL
1. LITTLE INTEREST OR PLEASURE IN DOING THINGS: NOT AT ALL

## 2025-06-19 NOTE — PROGRESS NOTES
found for this visit on 06/19/25.  Lab Review   No visits with results within 6 Month(s) from this visit.   Latest known visit with results is:   Orders Only on 12/12/2024   Component Date Value    Total Protein 12/12/2024 6.8     Albumin 12/12/2024 4.4     Alkaline Phosphatase 12/12/2024 84     ALT 12/12/2024 21     AST 12/12/2024 22     Total Bilirubin 12/12/2024 <0.2     Bilirubin, Direct 12/12/2024 <0.1     Bilirubin, Indirect 12/12/2024 see below     Cholesterol, Total 12/12/2024 137     Triglycerides 12/12/2024 122     HDL 12/12/2024 43     LDL Cholesterol 12/12/2024 70     VLDL Cholesterol Calcula* 12/12/2024 24            Medications, Allergies, Social history, Medical history, and results reviewed    The patient (or guardian, if applicable) and other individuals in attendance with the patient were advised that Artificial Intelligence will be utilized during this visit to record, process the conversation to generate a clinical note, and support improvement of the AI technology. The patient (or guardian, if applicable) and other individuals in attendance at the appointment consented to the use of AI, including the recording.      An electronic signature was used to authenticate this note.    -Esme Sullivan MD

## 2025-06-27 DIAGNOSIS — E11.9 TYPE 2 DIABETES MELLITUS WITHOUT COMPLICATION, WITHOUT LONG-TERM CURRENT USE OF INSULIN (HCC): ICD-10-CM

## 2025-06-27 DIAGNOSIS — E78.2 MIXED HYPERLIPIDEMIA: Chronic | ICD-10-CM

## 2025-06-27 DIAGNOSIS — I10 ESSENTIAL HYPERTENSION: ICD-10-CM

## 2025-06-27 DIAGNOSIS — D50.9 IRON DEFICIENCY ANEMIA, UNSPECIFIED IRON DEFICIENCY ANEMIA TYPE: ICD-10-CM

## 2025-06-27 LAB
ALBUMIN SERPL-MCNC: 4.1 G/DL (ref 3.4–5)
ALBUMIN/GLOB SERPL: 1.8 {RATIO} (ref 1.1–2.2)
ALP SERPL-CCNC: 82 U/L (ref 40–129)
ALT SERPL-CCNC: 18 U/L (ref 10–40)
ANION GAP SERPL CALCULATED.3IONS-SCNC: 11 MMOL/L (ref 3–16)
AST SERPL-CCNC: 21 U/L (ref 15–37)
BILIRUB SERPL-MCNC: 0.3 MG/DL (ref 0–1)
BUN SERPL-MCNC: 34 MG/DL (ref 7–20)
CALCIUM SERPL-MCNC: 9.6 MG/DL (ref 8.3–10.6)
CHLORIDE SERPL-SCNC: 107 MMOL/L (ref 99–110)
CHOLEST SERPL-MCNC: 124 MG/DL (ref 0–199)
CO2 SERPL-SCNC: 24 MMOL/L (ref 21–32)
CREAT SERPL-MCNC: 1.5 MG/DL (ref 0.8–1.3)
DEPRECATED RDW RBC AUTO: 13.5 % (ref 12.4–15.4)
EST. AVERAGE GLUCOSE BLD GHB EST-MCNC: 125.5 MG/DL
GFR SERPLBLD CREATININE-BSD FMLA CKD-EPI: 45 ML/MIN/{1.73_M2}
GLUCOSE SERPL-MCNC: 102 MG/DL (ref 70–99)
HBA1C MFR BLD: 6 %
HCT VFR BLD AUTO: 34.4 % (ref 40.5–52.5)
HDLC SERPL-MCNC: 40 MG/DL (ref 40–60)
HGB BLD-MCNC: 11.5 G/DL (ref 13.5–17.5)
IRON SATN MFR SERPL: 20 % (ref 20–50)
IRON SERPL-MCNC: 55 UG/DL (ref 59–158)
LDLC SERPL CALC-MCNC: 58 MG/DL
MCH RBC QN AUTO: 35.1 PG (ref 26–34)
MCHC RBC AUTO-ENTMCNC: 33.4 G/DL (ref 31–36)
MCV RBC AUTO: 105.3 FL (ref 80–100)
PLATELET # BLD AUTO: 355 K/UL (ref 135–450)
PMV BLD AUTO: 7.5 FL (ref 5–10.5)
POTASSIUM SERPL-SCNC: 5.9 MMOL/L (ref 3.5–5.1)
PROT SERPL-MCNC: 6.4 G/DL (ref 6.4–8.2)
RBC # BLD AUTO: 3.26 M/UL (ref 4.2–5.9)
SODIUM SERPL-SCNC: 142 MMOL/L (ref 136–145)
TIBC SERPL-MCNC: 274 UG/DL (ref 260–445)
TRIGL SERPL-MCNC: 132 MG/DL (ref 0–150)
VLDLC SERPL CALC-MCNC: 26 MG/DL
WBC # BLD AUTO: 10.1 K/UL (ref 4–11)

## 2025-07-04 PROBLEM — M79.604 RIGHT LEG PAIN: Status: ACTIVE | Noted: 2025-07-04

## 2025-07-04 PROBLEM — D50.9 IRON DEFICIENCY ANEMIA: Status: ACTIVE | Noted: 2025-07-04

## 2025-07-04 PROBLEM — I50.22 CHRONIC SYSTOLIC HEART FAILURE (HCC): Status: ACTIVE | Noted: 2025-07-04

## 2025-07-05 ASSESSMENT — ENCOUNTER SYMPTOMS
VOMITING: 0
CONSTIPATION: 0
WHEEZING: 0
DIARRHEA: 0
NAUSEA: 0
CHEST TIGHTNESS: 0
SHORTNESS OF BREATH: 0
COUGH: 0
ABDOMINAL PAIN: 0
RHINORRHEA: 0
SORE THROAT: 0

## 2025-07-30 ENCOUNTER — TELEPHONE (OUTPATIENT)
Dept: PRIMARY CARE CLINIC | Age: 89
End: 2025-07-30

## 2025-08-11 ENCOUNTER — PATIENT MESSAGE (OUTPATIENT)
Dept: PRIMARY CARE CLINIC | Age: 89
End: 2025-08-11

## (undated) DEVICE — STERILE POLYISOPRENE POWDER-FREE SURGICAL GLOVES: Brand: PROTEXIS

## (undated) DEVICE — CYSTO: Brand: MEDLINE INDUSTRIES, INC.

## (undated) DEVICE — GUIDEWIRE UROLOGICAL STR STD 0.035 IN X150 CM (QTY = BOX OF 5)

## (undated) DEVICE — CHLORAPREP 26ML ORANGE

## (undated) DEVICE — SET,IRRIGATION,CYSTO,Y-TYPE,90": Brand: MEDLINE

## (undated) DEVICE — 3M™ TEGADERM™ +PAD FILM DRESSING WITH NON-ADHERENT PAD, 3588, 6 IN X 6 IN (15 CM X 15 CM), 25/CAR, 4 CAR/CS: Brand: 3M™ TEGADERM™

## (undated) DEVICE — GLOVE ORANGE PI 7 1/2   MSG9075

## (undated) DEVICE — HYPODERMIC SAFETY NEEDLE: Brand: MAGELLAN

## (undated) DEVICE — SYRINGE MED 3ML CLR PLAS STD N CTRL LUERLOCK TIP DISP

## (undated) DEVICE — PEN: MARKING STD 100/CS: Brand: MEDICAL ACTION INDUSTRIES

## (undated) DEVICE — STRIP,CLOSURE,WOUND,MEDI-STRIP,1/2X4: Brand: MEDLINE

## (undated) DEVICE — SOLUTION IRRIG 1000ML STRL H2O USP PLAS POUR BTL

## (undated) DEVICE — DISPOSABLE OR TOWEL: Brand: CARDINAL HEALTH

## (undated) DEVICE — UNIVERSAL BLOCK TRAY: Brand: AVANOS*

## (undated) DEVICE — BAG DRNGE COMB PK

## (undated) DEVICE — STANDARD HYPODERMIC NEEDLE,POLYPROPYLENE HUB: Brand: MONOJECT

## (undated) DEVICE — TOWEL,OR,DSP,ST,BLUE,STD,4/PK,20PK/CS: Brand: MEDLINE

## (undated) DEVICE — OPEN-END FLEXI-TIP URETERAL CATHETER: Brand: FLEXI-TIP

## (undated) DEVICE — SINGLE ACTION PUMPING SYSTEM

## (undated) DEVICE — SYRINGE,TOOMEY,IRRIGATION,70CC,STERILE: Brand: MEDLINE

## (undated) DEVICE — SYRINGE MED 10ML LUERLOCK TIP W/O SFTY DISP

## (undated) DEVICE — NEEDLE SPNL 22GA L7IN BLK HUB S STL W/ QNCKE PNT W/OUT

## (undated) DEVICE — MEDIA CONTRAST RX ISOVUE-300 61% 30ML VIALS

## (undated) DEVICE — NEEDLE SPNL 22GA L3.5IN BLK HUB S STL REG WALL FIT STYL W/

## (undated) DEVICE — NITINOL STONE RETRIEVAL BASKET: Brand: ZERO TIP

## (undated) DEVICE — NEEDLE RF 20GA L10CM TIP L10MM CVD ACT TIP SL

## (undated) DEVICE — Device: Brand: JELCO

## (undated) DEVICE — SOLUTION IRRIG 2000ML STRL H2O UROMATIC PLAS CONT USP

## (undated) DEVICE — Z CONVERTED USE 2275871 SPONGE GZ W4XL4IN WHT 8 PLY CURITY

## (undated) DEVICE — BOWL MED L 32OZ PLAS W/ MOLD GRAD EZ OPN PEEL PCH

## (undated) DEVICE — STERILE DISPOSABLE EQUIPMENT COVER - DOME COVER 22" DEPTH: Brand: PREFERRED MEDICAL PRODUCTS, LLC

## (undated) DEVICE — SHEET,DRAPE,53X77,STERILE: Brand: MEDLINE

## (undated) DEVICE — MASTISOL ADHESIVE LIQ 2/3ML

## (undated) DEVICE — PORT VLV 2 W NDL FREE SMRTSITE

## (undated) DEVICE — PAD GRND NEUT ELECTRD AD DISP

## (undated) DEVICE — GAUZE,SPONGE,4"X4",8PLY,STRL,LF,10/TRAY: Brand: MEDLINE